# Patient Record
Sex: FEMALE | Race: BLACK OR AFRICAN AMERICAN | Employment: OTHER | URBAN - METROPOLITAN AREA
[De-identification: names, ages, dates, MRNs, and addresses within clinical notes are randomized per-mention and may not be internally consistent; named-entity substitution may affect disease eponyms.]

---

## 2017-01-01 ENCOUNTER — APPOINTMENT (OUTPATIENT)
Dept: CT IMAGING | Age: 72
DRG: 853 | End: 2017-01-01
Attending: EMERGENCY MEDICINE
Payer: MEDICARE

## 2017-01-01 ENCOUNTER — ANESTHESIA EVENT (OUTPATIENT)
Dept: SURGERY | Age: 72
DRG: 853 | End: 2017-01-01
Payer: MEDICARE

## 2017-01-01 ENCOUNTER — HOSPITAL ENCOUNTER (INPATIENT)
Age: 72
LOS: 8 days | DRG: 853 | End: 2017-07-20
Attending: EMERGENCY MEDICINE | Admitting: INTERNAL MEDICINE
Payer: MEDICARE

## 2017-01-01 ENCOUNTER — ANESTHESIA (OUTPATIENT)
Dept: SURGERY | Age: 72
DRG: 853 | End: 2017-01-01
Payer: MEDICARE

## 2017-01-01 ENCOUNTER — HOSPITAL ENCOUNTER (EMERGENCY)
Age: 72
Discharge: HOME | End: 2017-06-23
Attending: EMERGENCY MEDICINE
Payer: MEDICARE

## 2017-01-01 ENCOUNTER — HOSPITAL ENCOUNTER (OUTPATIENT)
Dept: GENERAL RADIOLOGY | Age: 72
Discharge: HOME | End: 2017-03-24
Attending: INTERNAL MEDICINE
Payer: MEDICARE

## 2017-01-01 ENCOUNTER — APPOINTMENT (OUTPATIENT)
Dept: GENERAL RADIOLOGY | Age: 72
DRG: 853 | End: 2017-01-01
Attending: EMERGENCY MEDICINE
Payer: MEDICARE

## 2017-01-01 ENCOUNTER — APPOINTMENT (OUTPATIENT)
Dept: GENERAL RADIOLOGY | Age: 72
DRG: 853 | End: 2017-01-01
Attending: PODIATRIST
Payer: MEDICARE

## 2017-01-01 ENCOUNTER — APPOINTMENT (OUTPATIENT)
Dept: GENERAL RADIOLOGY | Age: 72
DRG: 853 | End: 2017-01-01
Attending: ANESTHESIOLOGY
Payer: MEDICARE

## 2017-01-01 ENCOUNTER — APPOINTMENT (OUTPATIENT)
Dept: INTERVENTIONAL RADIOLOGY/VASCULAR | Age: 72
DRG: 853 | End: 2017-01-01
Attending: FAMILY MEDICINE
Payer: MEDICARE

## 2017-01-01 VITALS
TEMPERATURE: 98.1 F | OXYGEN SATURATION: 100 % | SYSTOLIC BLOOD PRESSURE: 93 MMHG | DIASTOLIC BLOOD PRESSURE: 42 MMHG | BODY MASS INDEX: 22.53 KG/M2 | HEART RATE: 88 BPM | HEIGHT: 66 IN | RESPIRATION RATE: 20 BRPM | WEIGHT: 140.21 LBS

## 2017-01-01 VITALS
BODY MASS INDEX: 19.29 KG/M2 | TEMPERATURE: 98.2 F | SYSTOLIC BLOOD PRESSURE: 113 MMHG | HEIGHT: 66 IN | OXYGEN SATURATION: 90 % | HEART RATE: 52 BPM | RESPIRATION RATE: 15 BRPM | WEIGHT: 120 LBS | DIASTOLIC BLOOD PRESSURE: 82 MMHG

## 2017-01-01 DIAGNOSIS — R41.0 CONFUSION: ICD-10-CM

## 2017-01-01 DIAGNOSIS — R53.1 WEAKNESS: Primary | ICD-10-CM

## 2017-01-01 DIAGNOSIS — L08.9 DIABETIC FOOT INFECTION (HCC): ICD-10-CM

## 2017-01-01 DIAGNOSIS — J43.9 PULMONARY EMPHYSEMA, UNSPECIFIED EMPHYSEMA TYPE (HCC): ICD-10-CM

## 2017-01-01 DIAGNOSIS — R53.81 DEBILITY: ICD-10-CM

## 2017-01-01 DIAGNOSIS — R19.7 DIARRHEA, UNSPECIFIED TYPE: ICD-10-CM

## 2017-01-01 DIAGNOSIS — Z71.89 GOALS OF CARE, COUNSELING/DISCUSSION: ICD-10-CM

## 2017-01-01 DIAGNOSIS — I96 GANGRENE OF FOOT (HCC): ICD-10-CM

## 2017-01-01 DIAGNOSIS — J90 UNSPECIFIED PLEURAL EFFUSION: ICD-10-CM

## 2017-01-01 DIAGNOSIS — R52 PAIN: ICD-10-CM

## 2017-01-01 DIAGNOSIS — N18.6 ESRD (END STAGE RENAL DISEASE) (HCC): ICD-10-CM

## 2017-01-01 DIAGNOSIS — E11.628 DIABETIC FOOT INFECTION (HCC): ICD-10-CM

## 2017-01-01 DIAGNOSIS — R19.7 DIARRHEA OF PRESUMED INFECTIOUS ORIGIN: ICD-10-CM

## 2017-01-01 DIAGNOSIS — A41.9 SEPSIS, DUE TO UNSPECIFIED ORGANISM: Primary | ICD-10-CM

## 2017-01-01 DIAGNOSIS — D72.829 LEUKOCYTOSIS, UNSPECIFIED TYPE: ICD-10-CM

## 2017-01-01 LAB
ABO + RH BLD: NORMAL
ALBUMIN SERPL BCP-MCNC: 1.8 G/DL (ref 3.5–5)
ALBUMIN SERPL BCP-MCNC: 1.9 G/DL (ref 3.5–5)
ALBUMIN SERPL BCP-MCNC: 1.9 G/DL (ref 3.5–5)
ALBUMIN SERPL BCP-MCNC: 2 G/DL (ref 3.5–5)
ALBUMIN SERPL BCP-MCNC: 2.1 G/DL (ref 3.5–5)
ALBUMIN SERPL BCP-MCNC: 2.2 G/DL (ref 3.5–5)
ALBUMIN SERPL BCP-MCNC: 2.2 G/DL (ref 3.5–5)
ALBUMIN SERPL BCP-MCNC: 2.4 G/DL (ref 3.5–5)
ALBUMIN SERPL BCP-MCNC: 3.1 G/DL (ref 3.5–5)
ALBUMIN/GLOB SERPL: 0.4 {RATIO} (ref 1.1–2.2)
ALBUMIN/GLOB SERPL: 0.5 {RATIO} (ref 1.1–2.2)
ALBUMIN/GLOB SERPL: 0.6 {RATIO} (ref 1.1–2.2)
ALP SERPL-CCNC: 116 U/L (ref 45–117)
ALP SERPL-CCNC: 125 U/L (ref 45–117)
ALP SERPL-CCNC: 132 U/L (ref 45–117)
ALP SERPL-CCNC: 135 U/L (ref 45–117)
ALP SERPL-CCNC: 146 U/L (ref 45–117)
ALP SERPL-CCNC: 147 U/L (ref 45–117)
ALP SERPL-CCNC: 164 U/L (ref 45–117)
ALP SERPL-CCNC: 170 U/L (ref 45–117)
ALP SERPL-CCNC: 99 U/L (ref 45–117)
ALT SERPL-CCNC: 11 U/L (ref 12–78)
ALT SERPL-CCNC: 11 U/L (ref 12–78)
ALT SERPL-CCNC: 13 U/L (ref 12–78)
ALT SERPL-CCNC: 16 U/L (ref 12–78)
ALT SERPL-CCNC: 22 U/L (ref 12–78)
ALT SERPL-CCNC: 26 U/L (ref 12–78)
ALT SERPL-CCNC: 35 U/L (ref 12–78)
ALT SERPL-CCNC: 46 U/L (ref 12–78)
ALT SERPL-CCNC: 46 U/L (ref 12–78)
ANION GAP BLD CALC-SCNC: 11 MMOL/L (ref 5–15)
ANION GAP BLD CALC-SCNC: 12 MMOL/L (ref 5–15)
ANION GAP BLD CALC-SCNC: 13 MMOL/L (ref 5–15)
ANION GAP BLD CALC-SCNC: 13 MMOL/L (ref 5–15)
ANION GAP BLD CALC-SCNC: 15 MMOL/L (ref 5–15)
ANION GAP BLD CALC-SCNC: 16 MMOL/L (ref 5–15)
AST SERPL W P-5'-P-CCNC: 125 U/L (ref 15–37)
AST SERPL W P-5'-P-CCNC: 135 U/L (ref 15–37)
AST SERPL W P-5'-P-CCNC: 14 U/L (ref 15–37)
AST SERPL W P-5'-P-CCNC: 147 U/L (ref 15–37)
AST SERPL W P-5'-P-CCNC: 18 U/L (ref 15–37)
AST SERPL W P-5'-P-CCNC: 21 U/L (ref 15–37)
AST SERPL W P-5'-P-CCNC: 24 U/L (ref 15–37)
AST SERPL W P-5'-P-CCNC: 25 U/L (ref 15–37)
AST SERPL W P-5'-P-CCNC: 96 U/L (ref 15–37)
ATRIAL RATE: 61 BPM
BACTERIA SPEC CULT: ABNORMAL
BACTERIA SPEC CULT: NORMAL
BASOPHILS # BLD AUTO: 0 K/UL (ref 0–0.1)
BASOPHILS # BLD AUTO: 0.1 K/UL (ref 0–0.1)
BASOPHILS # BLD: 0 % (ref 0–1)
BASOPHILS # BLD: 1 % (ref 0–1)
BILIRUB SERPL-MCNC: 0.6 MG/DL (ref 0.2–1)
BILIRUB SERPL-MCNC: 0.9 MG/DL (ref 0.2–1)
BILIRUB SERPL-MCNC: 0.9 MG/DL (ref 0.2–1)
BILIRUB SERPL-MCNC: 1 MG/DL (ref 0.2–1)
BILIRUB SERPL-MCNC: 1.1 MG/DL (ref 0.2–1)
BILIRUB SERPL-MCNC: 1.1 MG/DL (ref 0.2–1)
BILIRUB SERPL-MCNC: 1.3 MG/DL (ref 0.2–1)
BILIRUB SERPL-MCNC: 1.6 MG/DL (ref 0.2–1)
BILIRUB SERPL-MCNC: 1.8 MG/DL (ref 0.2–1)
BLASTS NFR BLD: 0 %
BLD PROD TYP BPU: NORMAL
BLOOD GROUP ANTIBODIES SERPL: NORMAL
BPU ID: NORMAL
BUN SERPL-MCNC: 25 MG/DL (ref 6–20)
BUN SERPL-MCNC: 32 MG/DL (ref 6–20)
BUN SERPL-MCNC: 33 MG/DL (ref 6–20)
BUN SERPL-MCNC: 35 MG/DL (ref 6–20)
BUN SERPL-MCNC: 36 MG/DL (ref 6–20)
BUN SERPL-MCNC: 36 MG/DL (ref 6–20)
BUN SERPL-MCNC: 37 MG/DL (ref 6–20)
BUN SERPL-MCNC: 37 MG/DL (ref 6–20)
BUN SERPL-MCNC: 40 MG/DL (ref 6–20)
BUN SERPL-MCNC: 47 MG/DL (ref 6–20)
BUN/CREAT SERPL: 5 (ref 12–20)
BUN/CREAT SERPL: 6 (ref 12–20)
BUN/CREAT SERPL: 7 (ref 12–20)
C DIFF TOX GENS STL QL NAA+PROBE: NEGATIVE
C JEJUNI+C COLI AG STL QL: NEGATIVE
CALCIUM SERPL-MCNC: 8.1 MG/DL (ref 8.5–10.1)
CALCIUM SERPL-MCNC: 8.4 MG/DL (ref 8.5–10.1)
CALCIUM SERPL-MCNC: 8.5 MG/DL (ref 8.5–10.1)
CALCIUM SERPL-MCNC: 8.6 MG/DL (ref 8.5–10.1)
CALCIUM SERPL-MCNC: 8.8 MG/DL (ref 8.5–10.1)
CALCIUM SERPL-MCNC: 8.8 MG/DL (ref 8.5–10.1)
CALCIUM SERPL-MCNC: 9.4 MG/DL (ref 8.5–10.1)
CALCIUM SERPL-MCNC: 9.5 MG/DL (ref 8.5–10.1)
CALCULATED P AXIS, ECG09: 36 DEGREES
CALCULATED R AXIS, ECG10: -33 DEGREES
CALCULATED T AXIS, ECG11: -173 DEGREES
CHLORIDE SERPL-SCNC: 100 MMOL/L (ref 97–108)
CHLORIDE SERPL-SCNC: 100 MMOL/L (ref 97–108)
CHLORIDE SERPL-SCNC: 105 MMOL/L (ref 97–108)
CHLORIDE SERPL-SCNC: 106 MMOL/L (ref 97–108)
CHLORIDE SERPL-SCNC: 107 MMOL/L (ref 97–108)
CHLORIDE SERPL-SCNC: 108 MMOL/L (ref 97–108)
CHLORIDE SERPL-SCNC: 108 MMOL/L (ref 97–108)
CHOLEST SERPL-MCNC: 88 MG/DL
CO2 SERPL-SCNC: 17 MMOL/L (ref 21–32)
CO2 SERPL-SCNC: 18 MMOL/L (ref 21–32)
CO2 SERPL-SCNC: 19 MMOL/L (ref 21–32)
CO2 SERPL-SCNC: 21 MMOL/L (ref 21–32)
CO2 SERPL-SCNC: 23 MMOL/L (ref 21–32)
CO2 SERPL-SCNC: 25 MMOL/L (ref 21–32)
CO2 SERPL-SCNC: 26 MMOL/L (ref 21–32)
CO2 SERPL-SCNC: 29 MMOL/L (ref 21–32)
CREAT SERPL-MCNC: 4.79 MG/DL (ref 0.55–1.02)
CREAT SERPL-MCNC: 5.18 MG/DL (ref 0.55–1.02)
CREAT SERPL-MCNC: 5.33 MG/DL (ref 0.55–1.02)
CREAT SERPL-MCNC: 5.39 MG/DL (ref 0.55–1.02)
CREAT SERPL-MCNC: 5.46 MG/DL (ref 0.55–1.02)
CREAT SERPL-MCNC: 5.5 MG/DL (ref 0.55–1.02)
CREAT SERPL-MCNC: 5.6 MG/DL (ref 0.55–1.02)
CREAT SERPL-MCNC: 5.62 MG/DL (ref 0.55–1.02)
CREAT SERPL-MCNC: 5.78 MG/DL (ref 0.55–1.02)
CREAT SERPL-MCNC: 6.32 MG/DL (ref 0.55–1.02)
CROSSMATCH RESULT,%XM: NORMAL
DIAGNOSIS, 93000: NORMAL
DIFFERENTIAL METHOD BLD: ABNORMAL
E COLI SXT1+2 STL IA: NEGATIVE
EOSINOPHIL # BLD: 0 K/UL (ref 0–0.4)
EOSINOPHIL # BLD: 0.1 K/UL (ref 0–0.4)
EOSINOPHIL # BLD: 0.1 K/UL (ref 0–0.4)
EOSINOPHIL # BLD: 0.3 K/UL (ref 0–0.4)
EOSINOPHIL # BLD: 0.4 K/UL (ref 0–0.4)
EOSINOPHIL NFR BLD: 0 % (ref 0–7)
EOSINOPHIL NFR BLD: 1 % (ref 0–7)
EOSINOPHIL NFR BLD: 1 % (ref 0–7)
EOSINOPHIL NFR BLD: 2 % (ref 0–7)
EOSINOPHIL NFR BLD: 3 % (ref 0–7)
ERYTHROCYTE [DISTWIDTH] IN BLOOD BY AUTOMATED COUNT: 17.7 % (ref 11.5–14.5)
ERYTHROCYTE [DISTWIDTH] IN BLOOD BY AUTOMATED COUNT: 17.7 % (ref 11.5–14.5)
ERYTHROCYTE [DISTWIDTH] IN BLOOD BY AUTOMATED COUNT: 17.9 % (ref 11.5–14.5)
ERYTHROCYTE [DISTWIDTH] IN BLOOD BY AUTOMATED COUNT: 18 % (ref 11.5–14.5)
ERYTHROCYTE [DISTWIDTH] IN BLOOD BY AUTOMATED COUNT: 18.2 % (ref 11.5–14.5)
ERYTHROCYTE [DISTWIDTH] IN BLOOD BY AUTOMATED COUNT: 18.3 % (ref 11.5–14.5)
ERYTHROCYTE [DISTWIDTH] IN BLOOD BY AUTOMATED COUNT: 18.3 % (ref 11.5–14.5)
ERYTHROCYTE [DISTWIDTH] IN BLOOD BY AUTOMATED COUNT: 18.9 % (ref 11.5–14.5)
ERYTHROCYTE [DISTWIDTH] IN BLOOD BY AUTOMATED COUNT: 19.1 % (ref 11.5–14.5)
ERYTHROCYTE [DISTWIDTH] IN BLOOD BY AUTOMATED COUNT: 19.5 % (ref 11.5–14.5)
ERYTHROCYTE [DISTWIDTH] IN BLOOD BY AUTOMATED COUNT: 20.4 % (ref 11.5–14.5)
EST. AVERAGE GLUCOSE BLD GHB EST-MCNC: 117 MG/DL
GLOBULIN SER CALC-MCNC: 4.6 G/DL (ref 2–4)
GLOBULIN SER CALC-MCNC: 4.8 G/DL (ref 2–4)
GLOBULIN SER CALC-MCNC: 4.8 G/DL (ref 2–4)
GLOBULIN SER CALC-MCNC: 4.9 G/DL (ref 2–4)
GLOBULIN SER CALC-MCNC: 5 G/DL (ref 2–4)
GLUCOSE BLD STRIP.AUTO-MCNC: 100 MG/DL (ref 65–100)
GLUCOSE BLD STRIP.AUTO-MCNC: 102 MG/DL (ref 65–100)
GLUCOSE BLD STRIP.AUTO-MCNC: 103 MG/DL (ref 65–100)
GLUCOSE BLD STRIP.AUTO-MCNC: 104 MG/DL (ref 65–100)
GLUCOSE BLD STRIP.AUTO-MCNC: 105 MG/DL (ref 65–100)
GLUCOSE BLD STRIP.AUTO-MCNC: 106 MG/DL (ref 65–100)
GLUCOSE BLD STRIP.AUTO-MCNC: 110 MG/DL (ref 65–100)
GLUCOSE BLD STRIP.AUTO-MCNC: 111 MG/DL (ref 65–100)
GLUCOSE BLD STRIP.AUTO-MCNC: 116 MG/DL (ref 65–100)
GLUCOSE BLD STRIP.AUTO-MCNC: 117 MG/DL (ref 65–100)
GLUCOSE BLD STRIP.AUTO-MCNC: 119 MG/DL (ref 65–100)
GLUCOSE BLD STRIP.AUTO-MCNC: 119 MG/DL (ref 65–100)
GLUCOSE BLD STRIP.AUTO-MCNC: 127 MG/DL (ref 65–100)
GLUCOSE BLD STRIP.AUTO-MCNC: 127 MG/DL (ref 65–100)
GLUCOSE BLD STRIP.AUTO-MCNC: 128 MG/DL (ref 65–100)
GLUCOSE BLD STRIP.AUTO-MCNC: 129 MG/DL (ref 65–100)
GLUCOSE BLD STRIP.AUTO-MCNC: 131 MG/DL (ref 65–100)
GLUCOSE BLD STRIP.AUTO-MCNC: 139 MG/DL (ref 65–100)
GLUCOSE BLD STRIP.AUTO-MCNC: 143 MG/DL (ref 65–100)
GLUCOSE BLD STRIP.AUTO-MCNC: 150 MG/DL (ref 65–100)
GLUCOSE BLD STRIP.AUTO-MCNC: 158 MG/DL (ref 65–100)
GLUCOSE BLD STRIP.AUTO-MCNC: 161 MG/DL (ref 65–100)
GLUCOSE BLD STRIP.AUTO-MCNC: 169 MG/DL (ref 65–100)
GLUCOSE BLD STRIP.AUTO-MCNC: 183 MG/DL (ref 65–100)
GLUCOSE BLD STRIP.AUTO-MCNC: 54 MG/DL (ref 65–100)
GLUCOSE BLD STRIP.AUTO-MCNC: 59 MG/DL (ref 65–100)
GLUCOSE BLD STRIP.AUTO-MCNC: 70 MG/DL (ref 65–100)
GLUCOSE BLD STRIP.AUTO-MCNC: 71 MG/DL (ref 65–100)
GLUCOSE BLD STRIP.AUTO-MCNC: 77 MG/DL (ref 65–100)
GLUCOSE BLD STRIP.AUTO-MCNC: 78 MG/DL (ref 65–100)
GLUCOSE BLD STRIP.AUTO-MCNC: 80 MG/DL (ref 65–100)
GLUCOSE BLD STRIP.AUTO-MCNC: 83 MG/DL (ref 65–100)
GLUCOSE BLD STRIP.AUTO-MCNC: 87 MG/DL (ref 65–100)
GLUCOSE BLD STRIP.AUTO-MCNC: 88 MG/DL (ref 65–100)
GLUCOSE BLD STRIP.AUTO-MCNC: 91 MG/DL (ref 65–100)
GLUCOSE BLD STRIP.AUTO-MCNC: 94 MG/DL (ref 65–100)
GLUCOSE BLD STRIP.AUTO-MCNC: 95 MG/DL (ref 65–100)
GLUCOSE BLD STRIP.AUTO-MCNC: 99 MG/DL (ref 65–100)
GLUCOSE SERPL-MCNC: 101 MG/DL (ref 65–100)
GLUCOSE SERPL-MCNC: 102 MG/DL (ref 65–100)
GLUCOSE SERPL-MCNC: 103 MG/DL (ref 65–100)
GLUCOSE SERPL-MCNC: 115 MG/DL (ref 65–100)
GLUCOSE SERPL-MCNC: 115 MG/DL (ref 65–100)
GLUCOSE SERPL-MCNC: 119 MG/DL (ref 65–100)
GLUCOSE SERPL-MCNC: 127 MG/DL (ref 65–100)
GLUCOSE SERPL-MCNC: 148 MG/DL (ref 65–100)
GLUCOSE SERPL-MCNC: 154 MG/DL (ref 65–100)
GLUCOSE SERPL-MCNC: 87 MG/DL (ref 65–100)
GRAM STN SPEC: ABNORMAL
HBA1C MFR BLD: 5.7 % (ref 4.2–6.3)
HBV SURFACE AG SER QL: <0.1 INDEX
HBV SURFACE AG SER QL: NEGATIVE
HCT VFR BLD AUTO: 26.4 % (ref 35–47)
HCT VFR BLD AUTO: 27.3 % (ref 35–47)
HCT VFR BLD AUTO: 27.9 % (ref 35–47)
HCT VFR BLD AUTO: 28.1 % (ref 35–47)
HCT VFR BLD AUTO: 28.8 % (ref 35–47)
HCT VFR BLD AUTO: 29 % (ref 35–47)
HCT VFR BLD AUTO: 29.7 % (ref 35–47)
HCT VFR BLD AUTO: 29.9 % (ref 35–47)
HCT VFR BLD AUTO: 30.9 % (ref 35–47)
HCT VFR BLD AUTO: 31.3 % (ref 35–47)
HCT VFR BLD AUTO: 32.6 % (ref 35–47)
HCT VFR BLD AUTO: 33.6 % (ref 35–47)
HCT VFR BLD AUTO: 35.6 % (ref 35–47)
HDLC SERPL-MCNC: 60 MG/DL
HDLC SERPL: 1.5 {RATIO} (ref 0–5)
HEMOCCULT STL QL: NEGATIVE
HGB BLD-MCNC: 10 G/DL (ref 11.5–16)
HGB BLD-MCNC: 10 G/DL (ref 11.5–16)
HGB BLD-MCNC: 10.3 G/DL (ref 11.5–16)
HGB BLD-MCNC: 10.9 G/DL (ref 11.5–16)
HGB BLD-MCNC: 11.3 G/DL (ref 11.5–16)
HGB BLD-MCNC: 8.6 G/DL (ref 11.5–16)
HGB BLD-MCNC: 8.8 G/DL (ref 11.5–16)
HGB BLD-MCNC: 9.3 G/DL (ref 11.5–16)
HGB BLD-MCNC: 9.4 G/DL (ref 11.5–16)
HGB BLD-MCNC: 9.5 G/DL (ref 11.5–16)
HGB BLD-MCNC: 9.6 G/DL (ref 11.5–16)
HGB BLD-MCNC: 9.7 G/DL (ref 11.5–16)
HGB BLD-MCNC: 9.8 G/DL (ref 11.5–16)
INR PPP: 1.4 (ref 0.9–1.1)
LACTATE SERPL-SCNC: 1.7 MMOL/L (ref 0.4–2)
LACTATE SERPL-SCNC: 1.8 MMOL/L (ref 0.4–2)
LACTATE SERPL-SCNC: 4.2 MMOL/L (ref 0.4–2)
LDLC SERPL CALC-MCNC: 14 MG/DL (ref 0–100)
LIPASE SERPL-CCNC: 33 U/L (ref 73–393)
LIPID PROFILE,FLP: NORMAL
LYMPHOCYTES # BLD AUTO: 11 % (ref 12–49)
LYMPHOCYTES # BLD AUTO: 2 % (ref 12–49)
LYMPHOCYTES # BLD AUTO: 3 % (ref 12–49)
LYMPHOCYTES # BLD AUTO: 4 % (ref 12–49)
LYMPHOCYTES # BLD AUTO: 6 % (ref 12–49)
LYMPHOCYTES # BLD AUTO: 6 % (ref 12–49)
LYMPHOCYTES # BLD: 0.4 K/UL (ref 0.8–3.5)
LYMPHOCYTES # BLD: 0.5 K/UL (ref 0.8–3.5)
LYMPHOCYTES # BLD: 0.5 K/UL (ref 0.8–3.5)
LYMPHOCYTES # BLD: 0.6 K/UL (ref 0.8–3.5)
LYMPHOCYTES # BLD: 0.6 K/UL (ref 0.8–3.5)
LYMPHOCYTES # BLD: 0.7 K/UL (ref 0.8–3.5)
LYMPHOCYTES # BLD: 0.8 K/UL (ref 0.8–3.5)
LYMPHOCYTES # BLD: 0.9 K/UL (ref 0.8–3.5)
LYMPHOCYTES # BLD: 1 K/UL (ref 0.8–3.5)
MAGNESIUM SERPL-MCNC: 1.7 MG/DL (ref 1.6–2.4)
MAGNESIUM SERPL-MCNC: 1.7 MG/DL (ref 1.6–2.4)
MAGNESIUM SERPL-MCNC: 1.8 MG/DL (ref 1.6–2.4)
MAGNESIUM SERPL-MCNC: 1.9 MG/DL (ref 1.6–2.4)
MAGNESIUM SERPL-MCNC: 1.9 MG/DL (ref 1.6–2.4)
MAGNESIUM SERPL-MCNC: 2 MG/DL (ref 1.6–2.4)
MAGNESIUM SERPL-MCNC: 2.1 MG/DL (ref 1.6–2.4)
MANUAL DIFFERENTIAL PERFORMED BLD QL: ABNORMAL
MCH RBC QN AUTO: 29.4 PG (ref 26–34)
MCH RBC QN AUTO: 29.4 PG (ref 26–34)
MCH RBC QN AUTO: 29.5 PG (ref 26–34)
MCH RBC QN AUTO: 29.5 PG (ref 26–34)
MCH RBC QN AUTO: 29.6 PG (ref 26–34)
MCH RBC QN AUTO: 29.6 PG (ref 26–34)
MCH RBC QN AUTO: 29.9 PG (ref 26–34)
MCH RBC QN AUTO: 30.1 PG (ref 26–34)
MCH RBC QN AUTO: 30.1 PG (ref 26–34)
MCHC RBC AUTO-ENTMCNC: 31.6 G/DL (ref 30–36.5)
MCHC RBC AUTO-ENTMCNC: 31.7 G/DL (ref 30–36.5)
MCHC RBC AUTO-ENTMCNC: 31.9 G/DL (ref 30–36.5)
MCHC RBC AUTO-ENTMCNC: 32.2 G/DL (ref 30–36.5)
MCHC RBC AUTO-ENTMCNC: 32.4 G/DL (ref 30–36.5)
MCHC RBC AUTO-ENTMCNC: 32.6 G/DL (ref 30–36.5)
MCHC RBC AUTO-ENTMCNC: 32.6 G/DL (ref 30–36.5)
MCHC RBC AUTO-ENTMCNC: 33 G/DL (ref 30–36.5)
MCHC RBC AUTO-ENTMCNC: 33.1 G/DL (ref 30–36.5)
MCHC RBC AUTO-ENTMCNC: 33.3 G/DL (ref 30–36.5)
MCHC RBC AUTO-ENTMCNC: 33.8 G/DL (ref 30–36.5)
MCV RBC AUTO: 88.3 FL (ref 80–99)
MCV RBC AUTO: 88.4 FL (ref 80–99)
MCV RBC AUTO: 89.2 FL (ref 80–99)
MCV RBC AUTO: 89.5 FL (ref 80–99)
MCV RBC AUTO: 90.4 FL (ref 80–99)
MCV RBC AUTO: 91.2 FL (ref 80–99)
MCV RBC AUTO: 91.7 FL (ref 80–99)
MCV RBC AUTO: 93.2 FL (ref 80–99)
MCV RBC AUTO: 93.5 FL (ref 80–99)
MCV RBC AUTO: 94.3 FL (ref 80–99)
MCV RBC AUTO: 94.5 FL (ref 80–99)
METAMYELOCYTES NFR BLD MANUAL: 0 %
METAMYELOCYTES NFR BLD MANUAL: 1 %
MONOCYTES # BLD: 0 K/UL (ref 0–1)
MONOCYTES # BLD: 0.5 K/UL (ref 0–1)
MONOCYTES # BLD: 0.7 K/UL (ref 0–1)
MONOCYTES # BLD: 0.9 K/UL (ref 0–1)
MONOCYTES # BLD: 1 K/UL (ref 0–1)
MONOCYTES # BLD: 1 K/UL (ref 0–1)
MONOCYTES # BLD: 1.1 K/UL (ref 0–1)
MONOCYTES # BLD: 1.2 K/UL (ref 0–1)
MONOCYTES # BLD: 1.2 K/UL (ref 0–1)
MONOCYTES NFR BLD AUTO: 0 % (ref 5–13)
MONOCYTES NFR BLD AUTO: 4 % (ref 5–13)
MONOCYTES NFR BLD AUTO: 4 % (ref 5–13)
MONOCYTES NFR BLD AUTO: 6 % (ref 5–13)
MONOCYTES NFR BLD AUTO: 6 % (ref 5–13)
MONOCYTES NFR BLD AUTO: 7 % (ref 5–13)
MONOCYTES NFR BLD AUTO: 9 % (ref 5–13)
MYELOCYTES NFR BLD MANUAL: 0 %
MYELOCYTES NFR BLD MANUAL: 1 %
NEUTS BAND NFR BLD MANUAL: 0 % (ref 0–6)
NEUTS BAND NFR BLD MANUAL: 1 % (ref 0–6)
NEUTS SEG # BLD: 11.3 K/UL (ref 1.8–8)
NEUTS SEG # BLD: 12.6 K/UL (ref 1.8–8)
NEUTS SEG # BLD: 14.8 K/UL (ref 1.8–8)
NEUTS SEG # BLD: 15.6 K/UL (ref 1.8–8)
NEUTS SEG # BLD: 15.9 K/UL (ref 1.8–8)
NEUTS SEG # BLD: 18.4 K/UL (ref 1.8–8)
NEUTS SEG # BLD: 26.5 K/UL (ref 1.8–8)
NEUTS SEG # BLD: 4.6 K/UL (ref 1.8–8)
NEUTS SEG # BLD: 8.1 K/UL (ref 1.8–8)
NEUTS SEG NFR BLD AUTO: 79 % (ref 32–75)
NEUTS SEG NFR BLD AUTO: 84 % (ref 32–75)
NEUTS SEG NFR BLD AUTO: 84 % (ref 32–75)
NEUTS SEG NFR BLD AUTO: 87 % (ref 32–75)
NEUTS SEG NFR BLD AUTO: 87 % (ref 32–75)
NEUTS SEG NFR BLD AUTO: 89 % (ref 32–75)
NEUTS SEG NFR BLD AUTO: 90 % (ref 32–75)
NEUTS SEG NFR BLD AUTO: 94 % (ref 32–75)
NEUTS SEG NFR BLD AUTO: 95 % (ref 32–75)
NRBC # BLD: 0.16 K/UL (ref 0–0.01)
NRBC # BLD: 0.17 K/UL (ref 0–0.01)
NRBC # BLD: 0.18 K/UL (ref 0–0.01)
NRBC # BLD: 0.22 K/UL (ref 0–0.01)
NRBC # BLD: 0.31 K/UL (ref 0–0.01)
NRBC # BLD: 0.32 K/UL (ref 0–0.01)
NRBC # BLD: 0.37 K/UL (ref 0–0.01)
NRBC # BLD: 0.47 K/UL (ref 0–0.01)
NRBC BLD-RTO: 0.6 PER 100 WBC
NRBC BLD-RTO: 1 PER 100 WBC
NRBC BLD-RTO: 1.1 PER 100 WBC
NRBC BLD-RTO: 1.1 PER 100 WBC
NRBC BLD-RTO: 1.8 PER 100 WBC
NRBC BLD-RTO: 2 PER 100 WBC
NRBC BLD-RTO: 2.2 PER 100 WBC
NRBC BLD-RTO: 2.4 PER 100 WBC
NRBC BLD-RTO: 3 PER 100 WBC
NRBC BLD-RTO: 4.9 PER 100 WBC
P-R INTERVAL, ECG05: 152 MS
PHOSPHATE SERPL-MCNC: 4.4 MG/DL (ref 2.6–4.7)
PLATELET # BLD AUTO: 152 K/UL (ref 150–400)
PLATELET # BLD AUTO: 156 K/UL (ref 150–400)
PLATELET # BLD AUTO: 167 K/UL (ref 150–400)
PLATELET # BLD AUTO: 174 K/UL (ref 150–400)
PLATELET # BLD AUTO: 185 K/UL (ref 150–400)
PLATELET # BLD AUTO: 187 K/UL (ref 150–400)
PLATELET # BLD AUTO: 194 K/UL (ref 150–400)
PLATELET # BLD AUTO: 197 K/UL (ref 150–400)
PLATELET # BLD AUTO: 202 K/UL (ref 150–400)
PLATELET # BLD AUTO: 203 K/UL (ref 150–400)
PLATELET # BLD AUTO: 217 K/UL (ref 150–400)
POTASSIUM SERPL-SCNC: 3.7 MMOL/L (ref 3.5–5.1)
POTASSIUM SERPL-SCNC: 3.7 MMOL/L (ref 3.5–5.1)
POTASSIUM SERPL-SCNC: 3.8 MMOL/L (ref 3.5–5.1)
POTASSIUM SERPL-SCNC: 4 MMOL/L (ref 3.5–5.1)
POTASSIUM SERPL-SCNC: 4 MMOL/L (ref 3.5–5.1)
POTASSIUM SERPL-SCNC: 4.1 MMOL/L (ref 3.5–5.1)
POTASSIUM SERPL-SCNC: 4.1 MMOL/L (ref 3.5–5.1)
POTASSIUM SERPL-SCNC: 4.5 MMOL/L (ref 3.5–5.1)
POTASSIUM SERPL-SCNC: 4.6 MMOL/L (ref 3.5–5.1)
POTASSIUM SERPL-SCNC: 5 MMOL/L (ref 3.5–5.1)
PROMYELOCYTES NFR BLD MANUAL: 0 %
PROT SERPL-MCNC: 6.4 G/DL (ref 6.4–8.2)
PROT SERPL-MCNC: 6.7 G/DL (ref 6.4–8.2)
PROT SERPL-MCNC: 6.8 G/DL (ref 6.4–8.2)
PROT SERPL-MCNC: 6.9 G/DL (ref 6.4–8.2)
PROT SERPL-MCNC: 7 G/DL (ref 6.4–8.2)
PROT SERPL-MCNC: 7.1 G/DL (ref 6.4–8.2)
PROT SERPL-MCNC: 7.2 G/DL (ref 6.4–8.2)
PROT SERPL-MCNC: 7.4 G/DL (ref 6.4–8.2)
PROT SERPL-MCNC: 8 G/DL (ref 6.4–8.2)
PROTHROMBIN TIME: 14.5 SEC (ref 9–11.1)
Q-T INTERVAL, ECG07: 488 MS
QRS DURATION, ECG06: 98 MS
QTC CALCULATION (BEZET), ECG08: 491 MS
RBC # BLD AUTO: 2.92 M/UL (ref 3.8–5.2)
RBC # BLD AUTO: 2.92 M/UL (ref 3.8–5.2)
RBC # BLD AUTO: 3.14 M/UL (ref 3.8–5.2)
RBC # BLD AUTO: 3.16 M/UL (ref 3.8–5.2)
RBC # BLD AUTO: 3.18 M/UL (ref 3.8–5.2)
RBC # BLD AUTO: 3.24 M/UL (ref 3.8–5.2)
RBC # BLD AUTO: 3.32 M/UL (ref 3.8–5.2)
RBC # BLD AUTO: 3.33 M/UL (ref 3.8–5.2)
RBC # BLD AUTO: 3.39 M/UL (ref 3.8–5.2)
RBC # BLD AUTO: 3.45 M/UL (ref 3.8–5.2)
RBC # BLD AUTO: 3.82 M/UL (ref 3.8–5.2)
RBC MORPH BLD: ABNORMAL
SERVICE CMNT-IMP: ABNORMAL
SERVICE CMNT-IMP: NORMAL
SODIUM SERPL-SCNC: 135 MMOL/L (ref 136–145)
SODIUM SERPL-SCNC: 137 MMOL/L (ref 136–145)
SODIUM SERPL-SCNC: 138 MMOL/L (ref 136–145)
SODIUM SERPL-SCNC: 139 MMOL/L (ref 136–145)
SODIUM SERPL-SCNC: 139 MMOL/L (ref 136–145)
SODIUM SERPL-SCNC: 140 MMOL/L (ref 136–145)
SODIUM SERPL-SCNC: 141 MMOL/L (ref 136–145)
SODIUM SERPL-SCNC: 142 MMOL/L (ref 136–145)
SPECIMEN EXP DATE BLD: NORMAL
STATUS OF UNIT,%ST: NORMAL
TRIGL SERPL-MCNC: 70 MG/DL (ref ?–150)
TROPONIN I SERPL-MCNC: <0.04 NG/ML
TSH SERPL DL<=0.05 MIU/L-ACNC: 5.51 UIU/ML (ref 0.36–3.74)
UNIT DIVISION, %UDIV: 0
VANCOMYCIN SERPL-MCNC: 16.5 UG/ML
VENTRICULAR RATE, ECG03: 61 BPM
VLDLC SERPL CALC-MCNC: 14 MG/DL
WBC # BLD AUTO: 13.4 K/UL (ref 3.6–11)
WBC # BLD AUTO: 13.6 K/UL (ref 3.6–11)
WBC # BLD AUTO: 14.5 K/UL (ref 3.6–11)
WBC # BLD AUTO: 16.6 K/UL (ref 3.6–11)
WBC # BLD AUTO: 17 K/UL (ref 3.6–11)
WBC # BLD AUTO: 17.3 K/UL (ref 3.6–11)
WBC # BLD AUTO: 20.4 K/UL (ref 3.6–11)
WBC # BLD AUTO: 25.8 K/UL (ref 3.6–11)
WBC # BLD AUTO: 28.2 K/UL (ref 3.6–11)
WBC # BLD AUTO: 5.8 K/UL (ref 3.6–11)
WBC # BLD AUTO: 9.6 K/UL (ref 3.6–11)
WBC #/AREA STL HPF: NORMAL /HPF (ref 0–4)
WBC NRBC COR # BLD: ABNORMAL 10*3/UL
WBC OTHER # BLD MANUAL: 0 10*3/UL

## 2017-01-01 PROCEDURE — 74011250636 HC RX REV CODE- 250/636: Performed by: EMERGENCY MEDICINE

## 2017-01-01 PROCEDURE — 74011000250 HC RX REV CODE- 250: Performed by: PODIATRIST

## 2017-01-01 PROCEDURE — 77030011841 HC SUT PLN COVD -A: Performed by: PODIATRIST

## 2017-01-01 PROCEDURE — 90935 HEMODIALYSIS ONE EVALUATION: CPT

## 2017-01-01 PROCEDURE — 87493 C DIFF AMPLIFIED PROBE: CPT | Performed by: INTERNAL MEDICINE

## 2017-01-01 PROCEDURE — 74011250637 HC RX REV CODE- 250/637: Performed by: INTERNAL MEDICINE

## 2017-01-01 PROCEDURE — 73630 X-RAY EXAM OF FOOT: CPT

## 2017-01-01 PROCEDURE — 87205 SMEAR GRAM STAIN: CPT | Performed by: FAMILY MEDICINE

## 2017-01-01 PROCEDURE — 80048 BASIC METABOLIC PNL TOTAL CA: CPT | Performed by: SURGERY

## 2017-01-01 PROCEDURE — 74011250636 HC RX REV CODE- 250/636

## 2017-01-01 PROCEDURE — B4101ZZ FLUOROSCOPY OF ABDOMINAL AORTA USING LOW OSMOLAR CONTRAST: ICD-10-PCS

## 2017-01-01 PROCEDURE — 80053 COMPREHEN METABOLIC PANEL: CPT | Performed by: INTERNAL MEDICINE

## 2017-01-01 PROCEDURE — 77010033678 HC OXYGEN DAILY

## 2017-01-01 PROCEDURE — 36415 COLL VENOUS BLD VENIPUNCTURE: CPT | Performed by: INTERNAL MEDICINE

## 2017-01-01 PROCEDURE — 85025 COMPLETE CBC W/AUTO DIFF WBC: CPT | Performed by: FAMILY MEDICINE

## 2017-01-01 PROCEDURE — 83036 HEMOGLOBIN GLYCOSYLATED A1C: CPT | Performed by: INTERNAL MEDICINE

## 2017-01-01 PROCEDURE — 80053 COMPREHEN METABOLIC PANEL: CPT | Performed by: EMERGENCY MEDICINE

## 2017-01-01 PROCEDURE — 87147 CULTURE TYPE IMMUNOLOGIC: CPT | Performed by: FAMILY MEDICINE

## 2017-01-01 PROCEDURE — 36415 COLL VENOUS BLD VENIPUNCTURE: CPT | Performed by: SURGERY

## 2017-01-01 PROCEDURE — 74011000250 HC RX REV CODE- 250: Performed by: INTERNAL MEDICINE

## 2017-01-01 PROCEDURE — 74011250636 HC RX REV CODE- 250/636: Performed by: INTERNAL MEDICINE

## 2017-01-01 PROCEDURE — 87205 SMEAR GRAM STAIN: CPT | Performed by: PODIATRIST

## 2017-01-01 PROCEDURE — 74011000258 HC RX REV CODE- 258: Performed by: FAMILY MEDICINE

## 2017-01-01 PROCEDURE — 99284 EMERGENCY DEPT VISIT MOD MDM: CPT

## 2017-01-01 PROCEDURE — 96374 THER/PROPH/DIAG INJ IV PUSH: CPT

## 2017-01-01 PROCEDURE — 74011250637 HC RX REV CODE- 250/637: Performed by: FAMILY MEDICINE

## 2017-01-01 PROCEDURE — 5A1D60Z PERFORMANCE OF URINARY FILTRATION, MULTIPLE: ICD-10-PCS | Performed by: INTERNAL MEDICINE

## 2017-01-01 PROCEDURE — 74011000258 HC RX REV CODE- 258: Performed by: INTERNAL MEDICINE

## 2017-01-01 PROCEDURE — 36592 COLLECT BLOOD FROM PICC: CPT

## 2017-01-01 PROCEDURE — 85025 COMPLETE CBC W/AUTO DIFF WBC: CPT | Performed by: EMERGENCY MEDICINE

## 2017-01-01 PROCEDURE — 76450000000

## 2017-01-01 PROCEDURE — 04CH0ZZ EXTIRPATION OF MATTER FROM RIGHT EXTERNAL ILIAC ARTERY, OPEN APPROACH: ICD-10-PCS | Performed by: SURGERY

## 2017-01-01 PROCEDURE — 80053 COMPREHEN METABOLIC PANEL: CPT | Performed by: FAMILY MEDICINE

## 2017-01-01 PROCEDURE — 80202 ASSAY OF VANCOMYCIN: CPT

## 2017-01-01 PROCEDURE — 77030000032 HC CUF TRNQT ZIMM -B: Performed by: PODIATRIST

## 2017-01-01 PROCEDURE — 0Y6N0Z9 DETACHMENT AT LEFT FOOT, PARTIAL 1ST RAY, OPEN APPROACH: ICD-10-PCS | Performed by: PODIATRIST

## 2017-01-01 PROCEDURE — B41G1ZZ FLUOROSCOPY OF LEFT LOWER EXTREMITY ARTERIES USING LOW OSMOLAR CONTRAST: ICD-10-PCS

## 2017-01-01 PROCEDURE — 74011250636 HC RX REV CODE- 250/636: Performed by: ANESTHESIOLOGY

## 2017-01-01 PROCEDURE — 82962 GLUCOSE BLOOD TEST: CPT

## 2017-01-01 PROCEDURE — C1887 CATHETER, GUIDING: HCPCS

## 2017-01-01 PROCEDURE — 88304 TISSUE EXAM BY PATHOLOGIST: CPT | Performed by: INTERNAL MEDICINE

## 2017-01-01 PROCEDURE — 87045 FECES CULTURE AEROBIC BACT: CPT | Performed by: EMERGENCY MEDICINE

## 2017-01-01 PROCEDURE — 77030029065 HC DRSG HEMO QCLOT ZMED -B

## 2017-01-01 PROCEDURE — 74011000250 HC RX REV CODE- 250: Performed by: FAMILY MEDICINE

## 2017-01-01 PROCEDURE — 77030032490 HC SLV COMPR SCD KNE COVD -B

## 2017-01-01 PROCEDURE — P9047 ALBUMIN (HUMAN), 25%, 50ML: HCPCS | Performed by: INTERNAL MEDICINE

## 2017-01-01 PROCEDURE — 02HV33Z INSERTION OF INFUSION DEVICE INTO SUPERIOR VENA CAVA, PERCUTANEOUS APPROACH: ICD-10-PCS | Performed by: FAMILY MEDICINE

## 2017-01-01 PROCEDURE — 83605 ASSAY OF LACTIC ACID: CPT | Performed by: EMERGENCY MEDICINE

## 2017-01-01 PROCEDURE — 77030011640 HC PAD GRND REM COVD -A: Performed by: SURGERY

## 2017-01-01 PROCEDURE — 77030010852 HC CATH NB ADVNTG COOK -B

## 2017-01-01 PROCEDURE — 87340 HEPATITIS B SURFACE AG IA: CPT | Performed by: INTERNAL MEDICINE

## 2017-01-01 PROCEDURE — 88311 DECALCIFY TISSUE: CPT | Performed by: PODIATRIST

## 2017-01-01 PROCEDURE — 77030004530 HC CATH ANGI DX IMGR BSC -A

## 2017-01-01 PROCEDURE — 36415 COLL VENOUS BLD VENIPUNCTURE: CPT | Performed by: FAMILY MEDICINE

## 2017-01-01 PROCEDURE — 77030020782 HC GWN BAIR PAWS FLX 3M -B

## 2017-01-01 PROCEDURE — 83735 ASSAY OF MAGNESIUM: CPT | Performed by: FAMILY MEDICINE

## 2017-01-01 PROCEDURE — 74011636637 HC RX REV CODE- 636/637: Performed by: INTERNAL MEDICINE

## 2017-01-01 PROCEDURE — 82272 OCCULT BLD FECES 1-3 TESTS: CPT | Performed by: EMERGENCY MEDICINE

## 2017-01-01 PROCEDURE — 84443 ASSAY THYROID STIM HORMONE: CPT | Performed by: INTERNAL MEDICINE

## 2017-01-01 PROCEDURE — C1894 INTRO/SHEATH, NON-LASER: HCPCS

## 2017-01-01 PROCEDURE — 93306 TTE W/DOPPLER COMPLETE: CPT

## 2017-01-01 PROCEDURE — 0DJ08ZZ INSPECTION OF UPPER INTESTINAL TRACT, VIA NATURAL OR ARTIFICIAL OPENING ENDOSCOPIC: ICD-10-PCS | Performed by: INTERNAL MEDICINE

## 2017-01-01 PROCEDURE — 77030013060 HC DEV INFL PRSS MRTM -B: Performed by: SURGERY

## 2017-01-01 PROCEDURE — C1757 CATH, THROMBECTOMY/EMBOLECT: HCPCS | Performed by: SURGERY

## 2017-01-01 PROCEDURE — 85027 COMPLETE CBC AUTOMATED: CPT | Performed by: INTERNAL MEDICINE

## 2017-01-01 PROCEDURE — 77030014008 HC SPNG HEMSTAT J&J -C: Performed by: SURGERY

## 2017-01-01 PROCEDURE — 87077 CULTURE AEROBIC IDENTIFY: CPT | Performed by: PODIATRIST

## 2017-01-01 PROCEDURE — 76210000016 HC OR PH I REC 1 TO 1.5 HR: Performed by: PODIATRIST

## 2017-01-01 PROCEDURE — 93005 ELECTROCARDIOGRAM TRACING: CPT

## 2017-01-01 PROCEDURE — 65610000006 HC RM INTENSIVE CARE

## 2017-01-01 PROCEDURE — 76210000016 HC OR PH I REC 1 TO 1.5 HR: Performed by: SURGERY

## 2017-01-01 PROCEDURE — 77030029131 HC ADMN ST IV BLD N DEHP ICUM -B

## 2017-01-01 PROCEDURE — 80053 COMPREHEN METABOLIC PANEL: CPT

## 2017-01-01 PROCEDURE — 87186 SC STD MICRODIL/AGAR DIL: CPT | Performed by: PODIATRIST

## 2017-01-01 PROCEDURE — 0JBR0ZZ EXCISION OF LEFT FOOT SUBCUTANEOUS TISSUE AND FASCIA, OPEN APPROACH: ICD-10-PCS | Performed by: PODIATRIST

## 2017-01-01 PROCEDURE — 87186 SC STD MICRODIL/AGAR DIL: CPT | Performed by: FAMILY MEDICINE

## 2017-01-01 PROCEDURE — 76060000032 HC ANESTHESIA 0.5 TO 1 HR: Performed by: PODIATRIST

## 2017-01-01 PROCEDURE — 36415 COLL VENOUS BLD VENIPUNCTURE: CPT

## 2017-01-01 PROCEDURE — 71020 XR CHEST PA LAT: CPT

## 2017-01-01 PROCEDURE — 77030018836 HC SOL IRR NACL ICUM -A: Performed by: PODIATRIST

## 2017-01-01 PROCEDURE — 74011636320 HC RX REV CODE- 636/320

## 2017-01-01 PROCEDURE — P9016 RBC LEUKOCYTES REDUCED: HCPCS | Performed by: INTERNAL MEDICINE

## 2017-01-01 PROCEDURE — 77030002986 HC SUT PROL J&J -A: Performed by: PODIATRIST

## 2017-01-01 PROCEDURE — 99285 EMERGENCY DEPT VISIT HI MDM: CPT

## 2017-01-01 PROCEDURE — 77030034850: Performed by: SURGERY

## 2017-01-01 PROCEDURE — 77030011640 HC PAD GRND REM COVD -A: Performed by: PODIATRIST

## 2017-01-01 PROCEDURE — 85025 COMPLETE CBC W/AUTO DIFF WBC: CPT

## 2017-01-01 PROCEDURE — 96361 HYDRATE IV INFUSION ADD-ON: CPT

## 2017-01-01 PROCEDURE — 36415 COLL VENOUS BLD VENIPUNCTURE: CPT | Performed by: EMERGENCY MEDICINE

## 2017-01-01 PROCEDURE — 88311 DECALCIFY TISSUE: CPT | Performed by: INTERNAL MEDICINE

## 2017-01-01 PROCEDURE — 86900 BLOOD TYPING SEROLOGIC ABO: CPT | Performed by: INTERNAL MEDICINE

## 2017-01-01 PROCEDURE — 047D3ZZ DILATION OF LEFT COMMON ILIAC ARTERY, PERCUTANEOUS APPROACH: ICD-10-PCS

## 2017-01-01 PROCEDURE — 75625 CONTRAST EXAM ABDOMINL AORTA: CPT

## 2017-01-01 PROCEDURE — 89055 LEUKOCYTE ASSESSMENT FECAL: CPT | Performed by: EMERGENCY MEDICINE

## 2017-01-01 PROCEDURE — 83735 ASSAY OF MAGNESIUM: CPT | Performed by: SURGERY

## 2017-01-01 PROCEDURE — C1751 CATH, INF, PER/CENT/MIDLINE: HCPCS

## 2017-01-01 PROCEDURE — 74011250636 HC RX REV CODE- 250/636: Performed by: SURGERY

## 2017-01-01 PROCEDURE — 77030006773 HC BLD SAW OSC BRSM -A: Performed by: PODIATRIST

## 2017-01-01 PROCEDURE — 0S9N0ZZ DRAINAGE OF LEFT METATARSAL-PHALANGEAL JOINT, OPEN APPROACH: ICD-10-PCS | Performed by: PODIATRIST

## 2017-01-01 PROCEDURE — 74011250637 HC RX REV CODE- 250/637: Performed by: SURGERY

## 2017-01-01 PROCEDURE — 87075 CULTR BACTERIA EXCEPT BLOOD: CPT | Performed by: PODIATRIST

## 2017-01-01 PROCEDURE — C1768 GRAFT, VASCULAR: HCPCS | Performed by: SURGERY

## 2017-01-01 PROCEDURE — 77030031139 HC SUT VCRL2 J&J -A: Performed by: PODIATRIST

## 2017-01-01 PROCEDURE — 76040000019: Performed by: INTERNAL MEDICINE

## 2017-01-01 PROCEDURE — 77030020256 HC SOL INJ NACL 0.9%  500ML: Performed by: SURGERY

## 2017-01-01 PROCEDURE — 74011000250 HC RX REV CODE- 250

## 2017-01-01 PROCEDURE — 94762 N-INVAS EAR/PLS OXIMTRY CONT: CPT

## 2017-01-01 PROCEDURE — 77030011256 HC DRSG MEPILEX <16IN NO BORD MOLN -A

## 2017-01-01 PROCEDURE — 74011000250 HC RX REV CODE- 250: Performed by: SURGERY

## 2017-01-01 PROCEDURE — 77030002987 HC SUT PROL J&J -B: Performed by: SURGERY

## 2017-01-01 PROCEDURE — 83690 ASSAY OF LIPASE: CPT | Performed by: EMERGENCY MEDICINE

## 2017-01-01 PROCEDURE — 87040 BLOOD CULTURE FOR BACTERIA: CPT | Performed by: EMERGENCY MEDICINE

## 2017-01-01 PROCEDURE — C1725 CATH, TRANSLUMIN NON-LASER: HCPCS

## 2017-01-01 PROCEDURE — 30233N1 TRANSFUSION OF NONAUTOLOGOUS RED BLOOD CELLS INTO PERIPHERAL VEIN, PERCUTANEOUS APPROACH: ICD-10-PCS | Performed by: FAMILY MEDICINE

## 2017-01-01 PROCEDURE — 87075 CULTR BACTERIA EXCEPT BLOOD: CPT | Performed by: FAMILY MEDICINE

## 2017-01-01 PROCEDURE — 77030011943

## 2017-01-01 PROCEDURE — 87077 CULTURE AEROBIC IDENTIFY: CPT | Performed by: FAMILY MEDICINE

## 2017-01-01 PROCEDURE — 85610 PROTHROMBIN TIME: CPT | Performed by: EMERGENCY MEDICINE

## 2017-01-01 PROCEDURE — 83735 ASSAY OF MAGNESIUM: CPT

## 2017-01-01 PROCEDURE — 77030031139 HC SUT VCRL2 J&J -A: Performed by: SURGERY

## 2017-01-01 PROCEDURE — 0D9P70Z DRAINAGE OF RECTUM WITH DRAINAGE DEVICE, VIA NATURAL OR ARTIFICIAL OPENING: ICD-10-PCS | Performed by: FAMILY MEDICINE

## 2017-01-01 PROCEDURE — 76010000138 HC OR TIME 0.5 TO 1 HR: Performed by: PODIATRIST

## 2017-01-01 PROCEDURE — 74011000258 HC RX REV CODE- 258: Performed by: EMERGENCY MEDICINE

## 2017-01-01 PROCEDURE — 0DJD8ZZ INSPECTION OF LOWER INTESTINAL TRACT, VIA NATURAL OR ARTIFICIAL OPENING ENDOSCOPIC: ICD-10-PCS | Performed by: INTERNAL MEDICINE

## 2017-01-01 PROCEDURE — 85018 HEMOGLOBIN: CPT | Performed by: INTERNAL MEDICINE

## 2017-01-01 PROCEDURE — 80053 COMPREHEN METABOLIC PANEL: CPT | Performed by: SURGERY

## 2017-01-01 PROCEDURE — 84484 ASSAY OF TROPONIN QUANT: CPT | Performed by: EMERGENCY MEDICINE

## 2017-01-01 PROCEDURE — 73620 X-RAY EXAM OF FOOT: CPT

## 2017-01-01 PROCEDURE — C1769 GUIDE WIRE: HCPCS

## 2017-01-01 PROCEDURE — 85025 COMPLETE CBC W/AUTO DIFF WBC: CPT | Performed by: SURGERY

## 2017-01-01 PROCEDURE — 77030003704 HC NDL VASC ACC ARMD -A: Performed by: SURGERY

## 2017-01-01 PROCEDURE — 71010 XR CHEST PORT: CPT

## 2017-01-01 PROCEDURE — 77030002986 HC SUT PROL J&J -A: Performed by: SURGERY

## 2017-01-01 PROCEDURE — 83605 ASSAY OF LACTIC ACID: CPT | Performed by: INTERNAL MEDICINE

## 2017-01-01 PROCEDURE — C1760 CLOSURE DEV, VASC: HCPCS

## 2017-01-01 PROCEDURE — 76060000034 HC ANESTHESIA 1.5 TO 2 HR: Performed by: SURGERY

## 2017-01-01 PROCEDURE — 93926 LOWER EXTREMITY STUDY: CPT

## 2017-01-01 PROCEDURE — 84100 ASSAY OF PHOSPHORUS: CPT | Performed by: INTERNAL MEDICINE

## 2017-01-01 PROCEDURE — 86923 COMPATIBILITY TEST ELECTRIC: CPT | Performed by: INTERNAL MEDICINE

## 2017-01-01 PROCEDURE — 74176 CT ABD & PELVIS W/O CONTRAST: CPT

## 2017-01-01 PROCEDURE — 76010000153 HC OR TIME 1.5 TO 2 HR: Performed by: SURGERY

## 2017-01-01 PROCEDURE — 80061 LIPID PANEL: CPT | Performed by: INTERNAL MEDICINE

## 2017-01-01 PROCEDURE — 88305 TISSUE EXAM BY PATHOLOGIST: CPT | Performed by: PODIATRIST

## 2017-01-01 PROCEDURE — 36430 TRANSFUSION BLD/BLD COMPNT: CPT

## 2017-01-01 PROCEDURE — 77030013797 HC KT TRNSDUC PRSSR EDWD -A

## 2017-01-01 PROCEDURE — 83735 ASSAY OF MAGNESIUM: CPT | Performed by: INTERNAL MEDICINE

## 2017-01-01 PROCEDURE — 65660000000 HC RM CCU STEPDOWN

## 2017-01-01 PROCEDURE — 04UH0JZ SUPPLEMENT RIGHT EXTERNAL ILIAC ARTERY WITH SYNTHETIC SUBSTITUTE, OPEN APPROACH: ICD-10-PCS | Performed by: SURGERY

## 2017-01-01 DEVICE — GRAFT VASC W0.3XL3IN THK0.36IN CLLGN ULT THN KNIT PTCH: Type: IMPLANTABLE DEVICE | Site: GROIN | Status: FUNCTIONAL

## 2017-01-01 RX ORDER — SIMVASTATIN 20 MG/1
20 TABLET, FILM COATED ORAL
COMMUNITY

## 2017-01-01 RX ORDER — HEPARIN SODIUM 1000 [USP'U]/ML
INJECTION, SOLUTION INTRAVENOUS; SUBCUTANEOUS AS NEEDED
Status: DISCONTINUED | OUTPATIENT
Start: 2017-01-01 | End: 2017-01-01 | Stop reason: HOSPADM

## 2017-01-01 RX ORDER — DEXTROSE 50 % IN WATER (D50W) INTRAVENOUS SYRINGE
12.5-25 AS NEEDED
Status: DISCONTINUED | OUTPATIENT
Start: 2017-01-01 | End: 2017-01-01 | Stop reason: HOSPADM

## 2017-01-01 RX ORDER — LOPERAMIDE HYDROCHLORIDE 2 MG/1
2 CAPSULE ORAL 4 TIMES DAILY
Status: DISPENSED | OUTPATIENT
Start: 2017-01-01 | End: 2017-01-01

## 2017-01-01 RX ORDER — INSULIN LISPRO 100 [IU]/ML
INJECTION, SOLUTION INTRAVENOUS; SUBCUTANEOUS
Status: DISCONTINUED | OUTPATIENT
Start: 2017-01-01 | End: 2017-01-01 | Stop reason: HOSPADM

## 2017-01-01 RX ORDER — FLUMAZENIL 0.1 MG/ML
0.2 INJECTION INTRAVENOUS
Status: DISCONTINUED | OUTPATIENT
Start: 2017-01-01 | End: 2017-01-01 | Stop reason: HOSPADM

## 2017-01-01 RX ORDER — SULFAMETHOXAZOLE AND TRIMETHOPRIM 800; 160 MG/1; MG/1
1 TABLET ORAL 2 TIMES DAILY
Status: DISCONTINUED | OUTPATIENT
Start: 2017-01-01 | End: 2017-01-01 | Stop reason: ALTCHOICE

## 2017-01-01 RX ORDER — FAMOTIDINE 20 MG/1
20 TABLET, FILM COATED ORAL
Status: DISCONTINUED | OUTPATIENT
Start: 2017-01-01 | End: 2017-01-01 | Stop reason: HOSPADM

## 2017-01-01 RX ORDER — LOPERAMIDE HYDROCHLORIDE 2 MG/1
4 CAPSULE ORAL ONCE
Status: COMPLETED | OUTPATIENT
Start: 2017-01-01 | End: 2017-01-01

## 2017-01-01 RX ORDER — FLUTICASONE FUROATE AND VILANTEROL 100; 25 UG/1; UG/1
1 POWDER RESPIRATORY (INHALATION) DAILY
Status: DISCONTINUED | OUTPATIENT
Start: 2017-01-01 | End: 2017-01-01 | Stop reason: HOSPADM

## 2017-01-01 RX ORDER — SODIUM CHLORIDE 9 MG/ML
50 INJECTION, SOLUTION INTRAVENOUS CONTINUOUS
Status: DISCONTINUED | OUTPATIENT
Start: 2017-01-01 | End: 2017-01-01 | Stop reason: HOSPADM

## 2017-01-01 RX ORDER — ALBUTEROL SULFATE 90 UG/1
2 AEROSOL, METERED RESPIRATORY (INHALATION)
COMMUNITY

## 2017-01-01 RX ORDER — INSULIN LISPRO 100 [IU]/ML
INJECTION, SOLUTION INTRAVENOUS; SUBCUTANEOUS EVERY 6 HOURS
Status: DISCONTINUED | OUTPATIENT
Start: 2017-01-01 | End: 2017-01-01

## 2017-01-01 RX ORDER — ALBUTEROL SULFATE 90 UG/1
2 AEROSOL, METERED RESPIRATORY (INHALATION)
Status: DISCONTINUED | OUTPATIENT
Start: 2017-01-01 | End: 2017-01-01 | Stop reason: HOSPADM

## 2017-01-01 RX ORDER — METRONIDAZOLE 500 MG/100ML
500 INJECTION, SOLUTION INTRAVENOUS EVERY 6 HOURS
Status: DISCONTINUED | OUTPATIENT
Start: 2017-01-01 | End: 2017-01-01

## 2017-01-01 RX ORDER — MIDAZOLAM HYDROCHLORIDE 1 MG/ML
INJECTION, SOLUTION INTRAMUSCULAR; INTRAVENOUS AS NEEDED
Status: DISCONTINUED | OUTPATIENT
Start: 2017-01-01 | End: 2017-01-01 | Stop reason: HOSPADM

## 2017-01-01 RX ORDER — SEVELAMER HYDROCHLORIDE 800 MG/1
1600 TABLET, FILM COATED ORAL
Status: DISCONTINUED | OUTPATIENT
Start: 2017-01-01 | End: 2017-01-01 | Stop reason: HOSPADM

## 2017-01-01 RX ORDER — SODIUM CHLORIDE 9 MG/ML
250 INJECTION, SOLUTION INTRAVENOUS AS NEEDED
Status: DISCONTINUED | OUTPATIENT
Start: 2017-01-01 | End: 2017-01-01

## 2017-01-01 RX ORDER — MIDAZOLAM HYDROCHLORIDE 1 MG/ML
.25-5 INJECTION, SOLUTION INTRAMUSCULAR; INTRAVENOUS
Status: DISCONTINUED | OUTPATIENT
Start: 2017-01-01 | End: 2017-01-01 | Stop reason: HOSPADM

## 2017-01-01 RX ORDER — HEPARIN SODIUM 1000 [USP'U]/ML
1000-5000 INJECTION, SOLUTION INTRAVENOUS; SUBCUTANEOUS
Status: DISCONTINUED | OUTPATIENT
Start: 2017-01-01 | End: 2017-01-01 | Stop reason: HOSPADM

## 2017-01-01 RX ORDER — LANOLIN ALCOHOL/MO/W.PET/CERES
325 CREAM (GRAM) TOPICAL DAILY
COMMUNITY

## 2017-01-01 RX ORDER — DIPHENHYDRAMINE HCL 25 MG
25 CAPSULE ORAL
Status: DISCONTINUED | OUTPATIENT
Start: 2017-01-01 | End: 2017-01-01 | Stop reason: HOSPADM

## 2017-01-01 RX ORDER — SULFAMETHOXAZOLE AND TRIMETHOPRIM 800; 160 MG/1; MG/1
1 TABLET ORAL 2 TIMES DAILY
COMMUNITY
Start: 2017-01-01

## 2017-01-01 RX ORDER — FENTANYL CITRATE 50 UG/ML
25-200 INJECTION, SOLUTION INTRAMUSCULAR; INTRAVENOUS
Status: DISCONTINUED | OUTPATIENT
Start: 2017-01-01 | End: 2017-01-01 | Stop reason: HOSPADM

## 2017-01-01 RX ORDER — SEVELAMER CARBONATE 800 MG/1
1600 TABLET, FILM COATED ORAL 3 TIMES DAILY
Status: DISCONTINUED | OUTPATIENT
Start: 2017-01-01 | End: 2017-01-01 | Stop reason: SDUPTHER

## 2017-01-01 RX ORDER — ACETAMINOPHEN 325 MG/1
650 TABLET ORAL
Status: DISCONTINUED | OUTPATIENT
Start: 2017-01-01 | End: 2017-01-01 | Stop reason: HOSPADM

## 2017-01-01 RX ORDER — LANOLIN ALCOHOL/MO/W.PET/CERES
325 CREAM (GRAM) TOPICAL DAILY
Status: DISCONTINUED | OUTPATIENT
Start: 2017-01-01 | End: 2017-01-01 | Stop reason: HOSPADM

## 2017-01-01 RX ORDER — FENTANYL CITRATE 50 UG/ML
100 INJECTION, SOLUTION INTRAMUSCULAR; INTRAVENOUS
Status: DISCONTINUED | OUTPATIENT
Start: 2017-01-01 | End: 2017-01-01 | Stop reason: HOSPADM

## 2017-01-01 RX ORDER — HYDROMORPHONE HYDROCHLORIDE 1 MG/ML
.25-1 INJECTION, SOLUTION INTRAMUSCULAR; INTRAVENOUS; SUBCUTANEOUS
Status: DISCONTINUED | OUTPATIENT
Start: 2017-01-01 | End: 2017-01-01 | Stop reason: HOSPADM

## 2017-01-01 RX ORDER — ALBUMIN HUMAN 250 G/1000ML
12.5 SOLUTION INTRAVENOUS
Status: DISCONTINUED | OUTPATIENT
Start: 2017-01-01 | End: 2017-01-01 | Stop reason: HOSPADM

## 2017-01-01 RX ORDER — SODIUM CHLORIDE 0.9 % (FLUSH) 0.9 %
5-10 SYRINGE (ML) INJECTION AS NEEDED
Status: DISCONTINUED | OUTPATIENT
Start: 2017-01-01 | End: 2017-01-01 | Stop reason: HOSPADM

## 2017-01-01 RX ORDER — SEVELAMER HYDROCHLORIDE 800 MG/1
800 TABLET, FILM COATED ORAL
Status: DISCONTINUED | OUTPATIENT
Start: 2017-01-01 | End: 2017-01-01 | Stop reason: HOSPADM

## 2017-01-01 RX ORDER — FENTANYL CITRATE 50 UG/ML
INJECTION, SOLUTION INTRAMUSCULAR; INTRAVENOUS AS NEEDED
Status: DISCONTINUED | OUTPATIENT
Start: 2017-01-01 | End: 2017-01-01 | Stop reason: HOSPADM

## 2017-01-01 RX ORDER — SODIUM CHLORIDE 9 MG/ML
75 INJECTION, SOLUTION INTRAVENOUS CONTINUOUS
Status: DISCONTINUED | OUTPATIENT
Start: 2017-01-01 | End: 2017-01-01

## 2017-01-01 RX ORDER — SODIUM CHLORIDE, SODIUM LACTATE, POTASSIUM CHLORIDE, CALCIUM CHLORIDE 600; 310; 30; 20 MG/100ML; MG/100ML; MG/100ML; MG/100ML
125 INJECTION, SOLUTION INTRAVENOUS CONTINUOUS
Status: DISCONTINUED | OUTPATIENT
Start: 2017-01-01 | End: 2017-01-01 | Stop reason: HOSPADM

## 2017-01-01 RX ORDER — LIDOCAINE HYDROCHLORIDE 10 MG/ML
0.1 INJECTION, SOLUTION EPIDURAL; INFILTRATION; INTRACAUDAL; PERINEURAL AS NEEDED
Status: DISCONTINUED | OUTPATIENT
Start: 2017-01-01 | End: 2017-01-01 | Stop reason: HOSPADM

## 2017-01-01 RX ORDER — SEVELAMER HYDROCHLORIDE 800 MG/1
800 TABLET, FILM COATED ORAL DAILY
COMMUNITY

## 2017-01-01 RX ORDER — EPINEPHRINE 0.1 MG/ML
1 INJECTION INTRACARDIAC; INTRAVENOUS
Status: DISCONTINUED | OUTPATIENT
Start: 2017-01-01 | End: 2017-01-01 | Stop reason: HOSPADM

## 2017-01-01 RX ORDER — DEXTROMETHORPHAN/PSEUDOEPHED 2.5-7.5/.8
1.2 DROPS ORAL
Status: DISCONTINUED | OUTPATIENT
Start: 2017-01-01 | End: 2017-01-01 | Stop reason: HOSPADM

## 2017-01-01 RX ORDER — METOPROLOL TARTRATE 25 MG/1
12.5 TABLET, FILM COATED ORAL 2 TIMES DAILY
Status: DISCONTINUED | OUTPATIENT
Start: 2017-01-01 | End: 2017-01-01 | Stop reason: ALTCHOICE

## 2017-01-01 RX ORDER — SODIUM CHLORIDE 0.9 % (FLUSH) 0.9 %
5-10 SYRINGE (ML) INJECTION AS NEEDED
Status: CANCELLED | OUTPATIENT
Start: 2017-01-01 | End: 2017-01-01

## 2017-01-01 RX ORDER — NALOXONE HYDROCHLORIDE 0.4 MG/ML
0.2 INJECTION, SOLUTION INTRAMUSCULAR; INTRAVENOUS; SUBCUTANEOUS
Status: DISCONTINUED | OUTPATIENT
Start: 2017-01-01 | End: 2017-01-01 | Stop reason: HOSPADM

## 2017-01-01 RX ORDER — HEPARIN SODIUM 5000 [USP'U]/ML
5000 INJECTION, SOLUTION INTRAVENOUS; SUBCUTANEOUS EVERY 8 HOURS
Status: DISCONTINUED | OUTPATIENT
Start: 2017-01-01 | End: 2017-01-01

## 2017-01-01 RX ORDER — ISOSORBIDE MONONITRATE 30 MG/1
30 TABLET, EXTENDED RELEASE ORAL DAILY
COMMUNITY

## 2017-01-01 RX ORDER — ATROPINE SULFATE 0.1 MG/ML
0.5 INJECTION INTRAVENOUS
Status: DISCONTINUED | OUTPATIENT
Start: 2017-01-01 | End: 2017-01-01 | Stop reason: HOSPADM

## 2017-01-01 RX ORDER — IPRATROPIUM BROMIDE AND ALBUTEROL SULFATE 2.5; .5 MG/3ML; MG/3ML
3 SOLUTION RESPIRATORY (INHALATION)
Status: DISCONTINUED | OUTPATIENT
Start: 2017-01-01 | End: 2017-01-01 | Stop reason: HOSPADM

## 2017-01-01 RX ORDER — LIDOCAINE HYDROCHLORIDE 10 MG/ML
INJECTION INFILTRATION; PERINEURAL AS NEEDED
Status: DISCONTINUED | OUTPATIENT
Start: 2017-01-01 | End: 2017-01-01 | Stop reason: HOSPADM

## 2017-01-01 RX ORDER — NALOXONE HYDROCHLORIDE 0.4 MG/ML
0.4 INJECTION, SOLUTION INTRAMUSCULAR; INTRAVENOUS; SUBCUTANEOUS
Status: DISCONTINUED | OUTPATIENT
Start: 2017-01-01 | End: 2017-01-01 | Stop reason: HOSPADM

## 2017-01-01 RX ORDER — QUETIAPINE FUMARATE 25 MG/1
12.5 TABLET, FILM COATED ORAL 2 TIMES DAILY
Status: DISCONTINUED | OUTPATIENT
Start: 2017-01-01 | End: 2017-01-01 | Stop reason: HOSPADM

## 2017-01-01 RX ORDER — ONDANSETRON 2 MG/ML
4 INJECTION INTRAMUSCULAR; INTRAVENOUS
Status: DISCONTINUED | OUTPATIENT
Start: 2017-01-01 | End: 2017-01-01 | Stop reason: HOSPADM

## 2017-01-01 RX ORDER — MIDAZOLAM HYDROCHLORIDE 1 MG/ML
2 INJECTION, SOLUTION INTRAMUSCULAR; INTRAVENOUS
Status: DISCONTINUED | OUTPATIENT
Start: 2017-01-01 | End: 2017-01-01 | Stop reason: HOSPADM

## 2017-01-01 RX ORDER — SODIUM CHLORIDE 0.9 % (FLUSH) 0.9 %
5-10 SYRINGE (ML) INJECTION EVERY 8 HOURS
Status: CANCELLED | OUTPATIENT
Start: 2017-01-01 | End: 2017-01-01

## 2017-01-01 RX ORDER — MAGNESIUM SULFATE 100 %
4 CRYSTALS MISCELLANEOUS AS NEEDED
Status: DISCONTINUED | OUTPATIENT
Start: 2017-01-01 | End: 2017-01-01 | Stop reason: HOSPADM

## 2017-01-01 RX ORDER — SEVELAMER HYDROCHLORIDE 800 MG/1
800 TABLET, FILM COATED ORAL DAILY
Status: DISCONTINUED | OUTPATIENT
Start: 2017-01-01 | End: 2017-01-01 | Stop reason: SDUPTHER

## 2017-01-01 RX ORDER — ISOSORBIDE MONONITRATE 30 MG/1
30 TABLET, EXTENDED RELEASE ORAL DAILY
Status: DISCONTINUED | OUTPATIENT
Start: 2017-01-01 | End: 2017-01-01 | Stop reason: ALTCHOICE

## 2017-01-01 RX ORDER — SODIUM CHLORIDE, SODIUM LACTATE, POTASSIUM CHLORIDE, CALCIUM CHLORIDE 600; 310; 30; 20 MG/100ML; MG/100ML; MG/100ML; MG/100ML
INJECTION, SOLUTION INTRAVENOUS
Status: DISCONTINUED | OUTPATIENT
Start: 2017-01-01 | End: 2017-01-01 | Stop reason: HOSPADM

## 2017-01-01 RX ORDER — CEFAZOLIN SODIUM IN 0.9 % NACL 2 G/50 ML
2 INTRAVENOUS SOLUTION, PIGGYBACK (ML) INTRAVENOUS
Status: COMPLETED | OUTPATIENT
Start: 2017-01-01 | End: 2017-01-01

## 2017-01-01 RX ORDER — METOPROLOL TARTRATE 25 MG/1
12.5 TABLET, FILM COATED ORAL 2 TIMES DAILY
COMMUNITY

## 2017-01-01 RX ORDER — DIPHENHYDRAMINE HYDROCHLORIDE 50 MG/ML
12.5 INJECTION, SOLUTION INTRAMUSCULAR; INTRAVENOUS AS NEEDED
Status: DISCONTINUED | OUTPATIENT
Start: 2017-01-01 | End: 2017-01-01 | Stop reason: HOSPADM

## 2017-01-01 RX ORDER — MIDAZOLAM HYDROCHLORIDE 1 MG/ML
.5-1 INJECTION, SOLUTION INTRAMUSCULAR; INTRAVENOUS
Status: DISCONTINUED | OUTPATIENT
Start: 2017-01-01 | End: 2017-01-01 | Stop reason: HOSPADM

## 2017-01-01 RX ORDER — HEPARIN SODIUM 200 [USP'U]/100ML
500 INJECTION, SOLUTION INTRAVENOUS ONCE
Status: COMPLETED | OUTPATIENT
Start: 2017-01-01 | End: 2017-01-01

## 2017-01-01 RX ORDER — SIMVASTATIN 20 MG/1
20 TABLET, FILM COATED ORAL
Status: DISCONTINUED | OUTPATIENT
Start: 2017-01-01 | End: 2017-01-01 | Stop reason: HOSPADM

## 2017-01-01 RX ORDER — LIDOCAINE HYDROCHLORIDE 10 MG/ML
10-30 INJECTION INFILTRATION; PERINEURAL
Status: DISCONTINUED | OUTPATIENT
Start: 2017-01-01 | End: 2017-01-01 | Stop reason: HOSPADM

## 2017-01-01 RX ORDER — AMLODIPINE BESYLATE 5 MG/1
10 TABLET ORAL DAILY
Status: DISCONTINUED | OUTPATIENT
Start: 2017-01-01 | End: 2017-01-01 | Stop reason: ALTCHOICE

## 2017-01-01 RX ORDER — PANTOPRAZOLE SODIUM 40 MG/1
40 TABLET, DELAYED RELEASE ORAL 2 TIMES DAILY
Status: DISCONTINUED | OUTPATIENT
Start: 2017-01-01 | End: 2017-01-01 | Stop reason: HOSPADM

## 2017-01-01 RX ORDER — PROTAMINE SULFATE 10 MG/ML
INJECTION, SOLUTION INTRAVENOUS AS NEEDED
Status: DISCONTINUED | OUTPATIENT
Start: 2017-01-01 | End: 2017-01-01 | Stop reason: HOSPADM

## 2017-01-01 RX ORDER — SEVELAMER CARBONATE 800 MG/1
1600 TABLET, FILM COATED ORAL 3 TIMES DAILY
COMMUNITY

## 2017-01-01 RX ADMIN — MEROPENEM 500 MG: 500 INJECTION, POWDER, FOR SOLUTION INTRAVENOUS at 20:45

## 2017-01-01 RX ADMIN — RENAGEL 1600 MG: 800 TABLET ORAL at 17:00

## 2017-01-01 RX ADMIN — LOPERAMIDE HYDROCHLORIDE 4 MG: 2 CAPSULE ORAL at 13:53

## 2017-01-01 RX ADMIN — FLUTICASONE FUROATE AND VILANTEROL TRIFENATATE 1 PUFF: 100; 25 POWDER RESPIRATORY (INHALATION) at 11:03

## 2017-01-01 RX ADMIN — MEROPENEM 500 MG: 500 INJECTION, POWDER, FOR SOLUTION INTRAVENOUS at 21:14

## 2017-01-01 RX ADMIN — PHENYLEPHRINE HYDROCHLORIDE 60 MCG/MIN: 10 INJECTION INTRAVENOUS at 23:00

## 2017-01-01 RX ADMIN — FLUTICASONE FUROATE AND VILANTEROL TRIFENATATE 1 PUFF: 100; 25 POWDER RESPIRATORY (INHALATION) at 08:13

## 2017-01-01 RX ADMIN — PIPERACILLIN SODIUM,TAZOBACTAM SODIUM 3.38 G: 3; .375 INJECTION, POWDER, FOR SOLUTION INTRAVENOUS at 03:30

## 2017-01-01 RX ADMIN — RENAGEL 1600 MG: 800 TABLET ORAL at 12:18

## 2017-01-01 RX ADMIN — UMECLIDINIUM 1 PUFF: 62.5 AEROSOL, POWDER ORAL at 11:56

## 2017-01-01 RX ADMIN — MIDAZOLAM HYDROCHLORIDE 1 MG: 1 INJECTION, SOLUTION INTRAMUSCULAR; INTRAVENOUS at 20:21

## 2017-01-01 RX ADMIN — RENAGEL 1600 MG: 800 TABLET ORAL at 12:28

## 2017-01-01 RX ADMIN — DEXTROSE MONOHYDRATE 12.5 G: 25 INJECTION, SOLUTION INTRAVENOUS at 08:07

## 2017-01-01 RX ADMIN — Medication 1 PACKET: at 08:07

## 2017-01-01 RX ADMIN — QUETIAPINE FUMARATE 12.5 MG: 25 TABLET ORAL at 15:32

## 2017-01-01 RX ADMIN — SODIUM CHLORIDE, SODIUM LACTATE, POTASSIUM CHLORIDE, CALCIUM CHLORIDE: 600; 310; 30; 20 INJECTION, SOLUTION INTRAVENOUS at 10:10

## 2017-01-01 RX ADMIN — Medication 1 PACKET: at 08:19

## 2017-01-01 RX ADMIN — METRONIDAZOLE 500 MG: 500 INJECTION, SOLUTION INTRAVENOUS at 17:43

## 2017-01-01 RX ADMIN — Medication 1 PACKET: at 21:16

## 2017-01-01 RX ADMIN — Medication 1 PACKET: at 08:11

## 2017-01-01 RX ADMIN — RENAGEL 1600 MG: 800 TABLET ORAL at 16:35

## 2017-01-01 RX ADMIN — Medication 1 PACKET: at 15:32

## 2017-01-01 RX ADMIN — FENTANYL CITRATE 25 MCG: 50 INJECTION, SOLUTION INTRAMUSCULAR; INTRAVENOUS at 10:35

## 2017-01-01 RX ADMIN — PHENYLEPHRINE HYDROCHLORIDE 60 MCG/MIN: 10 INJECTION INTRAVENOUS at 14:10

## 2017-01-01 RX ADMIN — MIDAZOLAM HYDROCHLORIDE 1 MG: 1 INJECTION, SOLUTION INTRAMUSCULAR; INTRAVENOUS at 08:17

## 2017-01-01 RX ADMIN — PROTAMINE SULFATE 5 MG: 10 INJECTION, SOLUTION INTRAVENOUS at 11:33

## 2017-01-01 RX ADMIN — Medication 10 ML: at 21:17

## 2017-01-01 RX ADMIN — FENTANYL CITRATE 25 MCG: 50 INJECTION, SOLUTION INTRAMUSCULAR; INTRAVENOUS at 10:26

## 2017-01-01 RX ADMIN — MIDAZOLAM HYDROCHLORIDE 1 MG: 1 INJECTION, SOLUTION INTRAMUSCULAR; INTRAVENOUS at 10:10

## 2017-01-01 RX ADMIN — LOPERAMIDE HYDROCHLORIDE 2 MG: 2 CAPSULE ORAL at 08:19

## 2017-01-01 RX ADMIN — Medication 1 PACKET: at 00:30

## 2017-01-01 RX ADMIN — PROTAMINE SULFATE 10 MG: 10 INJECTION, SOLUTION INTRAVENOUS at 11:30

## 2017-01-01 RX ADMIN — SODIUM CHLORIDE 75 ML/HR: 900 INJECTION, SOLUTION INTRAVENOUS at 17:42

## 2017-01-01 RX ADMIN — RENAGEL 1600 MG: 800 TABLET ORAL at 18:45

## 2017-01-01 RX ADMIN — ONDANSETRON 4 MG: 2 INJECTION INTRAMUSCULAR; INTRAVENOUS at 15:01

## 2017-01-01 RX ADMIN — QUETIAPINE FUMARATE 12.5 MG: 25 TABLET ORAL at 21:16

## 2017-01-01 RX ADMIN — Medication 1 PACKET: at 21:19

## 2017-01-01 RX ADMIN — FAMOTIDINE 20 MG: 20 TABLET, FILM COATED ORAL at 21:19

## 2017-01-01 RX ADMIN — PANTOPRAZOLE SODIUM 40 MG: 40 TABLET, DELAYED RELEASE ORAL at 18:53

## 2017-01-01 RX ADMIN — FENTANYL CITRATE 25 MCG: 50 INJECTION, SOLUTION INTRAMUSCULAR; INTRAVENOUS at 16:13

## 2017-01-01 RX ADMIN — MIDAZOLAM HYDROCHLORIDE 1 MG: 1 INJECTION, SOLUTION INTRAMUSCULAR; INTRAVENOUS at 08:12

## 2017-01-01 RX ADMIN — PIPERACILLIN SODIUM,TAZOBACTAM SODIUM 3.38 G: 3; .375 INJECTION, POWDER, FOR SOLUTION INTRAVENOUS at 15:22

## 2017-01-01 RX ADMIN — PHENYLEPHRINE HYDROCHLORIDE 20 MCG/MIN: 10 INJECTION INTRAVENOUS at 17:06

## 2017-01-01 RX ADMIN — PIPERACILLIN SODIUM,TAZOBACTAM SODIUM 3.38 G: 3; .375 INJECTION, POWDER, FOR SOLUTION INTRAVENOUS at 17:08

## 2017-01-01 RX ADMIN — QUETIAPINE FUMARATE 12.5 MG: 25 TABLET ORAL at 20:45

## 2017-01-01 RX ADMIN — ACETAMINOPHEN 650 MG: 325 TABLET ORAL at 07:55

## 2017-01-01 RX ADMIN — HYOSCYAMINE SULFATE: 16 SOLUTION at 09:37

## 2017-01-01 RX ADMIN — FENTANYL CITRATE 50 MCG: 50 INJECTION, SOLUTION INTRAMUSCULAR; INTRAVENOUS at 08:12

## 2017-01-01 RX ADMIN — HEPARIN SODIUM 5000 UNITS: 5000 INJECTION, SOLUTION INTRAVENOUS; SUBCUTANEOUS at 16:33

## 2017-01-01 RX ADMIN — Medication 1 PACKET: at 17:03

## 2017-01-01 RX ADMIN — RENAGEL 1600 MG: 800 TABLET ORAL at 09:00

## 2017-01-01 RX ADMIN — MIDAZOLAM HYDROCHLORIDE 1 MG: 1 INJECTION, SOLUTION INTRAMUSCULAR; INTRAVENOUS at 16:13

## 2017-01-01 RX ADMIN — METRONIDAZOLE 500 MG: 500 INJECTION, SOLUTION INTRAVENOUS at 13:34

## 2017-01-01 RX ADMIN — DEXTROSE MONOHYDRATE 12.5 G: 25 INJECTION, SOLUTION INTRAVENOUS at 13:43

## 2017-01-01 RX ADMIN — CEFAZOLIN 2 G: 1 INJECTION, POWDER, FOR SOLUTION INTRAMUSCULAR; INTRAVENOUS; PARENTERAL at 10:16

## 2017-01-01 RX ADMIN — HEPARIN SODIUM 5000 UNITS: 5000 INJECTION, SOLUTION INTRAVENOUS; SUBCUTANEOUS at 08:07

## 2017-01-01 RX ADMIN — RENAGEL 1600 MG: 800 TABLET ORAL at 08:19

## 2017-01-01 RX ADMIN — DOXYCYCLINE 100 MG: 100 INJECTION, POWDER, LYOPHILIZED, FOR SOLUTION INTRAVENOUS at 21:50

## 2017-01-01 RX ADMIN — FAMOTIDINE 20 MG: 10 INJECTION INTRAVENOUS at 20:38

## 2017-01-01 RX ADMIN — RENAGEL 1600 MG: 800 TABLET ORAL at 08:10

## 2017-01-01 RX ADMIN — SODIUM CHLORIDE 500 ML: 900 INJECTION, SOLUTION INTRAVENOUS at 14:12

## 2017-01-01 RX ADMIN — METRONIDAZOLE 500 MG: 500 INJECTION, SOLUTION INTRAVENOUS at 04:31

## 2017-01-01 RX ADMIN — Medication 10 ML: at 20:39

## 2017-01-01 RX ADMIN — SODIUM CHLORIDE 75 ML/HR: 900 INJECTION, SOLUTION INTRAVENOUS at 21:01

## 2017-01-01 RX ADMIN — METRONIDAZOLE 500 MG: 500 INJECTION, SOLUTION INTRAVENOUS at 12:36

## 2017-01-01 RX ADMIN — PIPERACILLIN SODIUM,TAZOBACTAM SODIUM 3.38 G: 3; .375 INJECTION, POWDER, FOR SOLUTION INTRAVENOUS at 04:31

## 2017-01-01 RX ADMIN — QUETIAPINE FUMARATE 12.5 MG: 25 TABLET ORAL at 20:46

## 2017-01-01 RX ADMIN — METRONIDAZOLE 500 MG: 500 INJECTION, SOLUTION INTRAVENOUS at 05:36

## 2017-01-01 RX ADMIN — MEROPENEM 500 MG: 500 INJECTION, POWDER, FOR SOLUTION INTRAVENOUS at 20:47

## 2017-01-01 RX ADMIN — RENAGEL 1600 MG: 800 TABLET ORAL at 18:53

## 2017-01-01 RX ADMIN — LOPERAMIDE HYDROCHLORIDE 2 MG: 2 CAPSULE ORAL at 12:27

## 2017-01-01 RX ADMIN — SIMVASTATIN 20 MG: 20 TABLET, FILM COATED ORAL at 21:19

## 2017-01-01 RX ADMIN — HEPARIN SODIUM 1000 UNITS: 200 INJECTION, SOLUTION INTRAVENOUS at 16:06

## 2017-01-01 RX ADMIN — IOPAMIDOL 50 ML: 755 INJECTION, SOLUTION INTRAVENOUS at 16:47

## 2017-01-01 RX ADMIN — FLUTICASONE FUROATE AND VILANTEROL TRIFENATATE 1 PUFF: 100; 25 POWDER RESPIRATORY (INHALATION) at 08:11

## 2017-01-01 RX ADMIN — QUETIAPINE FUMARATE 12.5 MG: 25 TABLET ORAL at 17:02

## 2017-01-01 RX ADMIN — SODIUM CHLORIDE 1000 ML: 900 INJECTION, SOLUTION INTRAVENOUS at 20:30

## 2017-01-01 RX ADMIN — ACETAMINOPHEN 650 MG: 325 TABLET ORAL at 23:13

## 2017-01-01 RX ADMIN — Medication 1 PACKET: at 16:21

## 2017-01-01 RX ADMIN — FENTANYL CITRATE 50 MCG: 50 INJECTION, SOLUTION INTRAMUSCULAR; INTRAVENOUS at 10:17

## 2017-01-01 RX ADMIN — HEPARIN SODIUM 3000 UNITS: 1000 INJECTION, SOLUTION INTRAVENOUS; SUBCUTANEOUS at 16:28

## 2017-01-01 RX ADMIN — FLUTICASONE FUROATE AND VILANTEROL TRIFENATATE 1 PUFF: 100; 25 POWDER RESPIRATORY (INHALATION) at 08:20

## 2017-01-01 RX ADMIN — HYOSCYAMINE SULFATE: 16 SOLUTION at 12:18

## 2017-01-01 RX ADMIN — QUETIAPINE FUMARATE 12.5 MG: 25 TABLET ORAL at 19:49

## 2017-01-01 RX ADMIN — QUETIAPINE FUMARATE 12.5 MG: 25 TABLET ORAL at 16:21

## 2017-01-01 RX ADMIN — RENAGEL 1600 MG: 800 TABLET ORAL at 08:28

## 2017-01-01 RX ADMIN — PANTOPRAZOLE SODIUM 40 MG: 40 TABLET, DELAYED RELEASE ORAL at 08:11

## 2017-01-01 RX ADMIN — HEPARIN SODIUM 5000 UNITS: 1000 INJECTION, SOLUTION INTRAVENOUS; SUBCUTANEOUS at 10:34

## 2017-01-01 RX ADMIN — Medication 10 ML: at 13:26

## 2017-01-01 RX ADMIN — SODIUM CHLORIDE, SODIUM LACTATE, POTASSIUM CHLORIDE, CALCIUM CHLORIDE: 600; 310; 30; 20 INJECTION, SOLUTION INTRAVENOUS at 11:32

## 2017-01-01 RX ADMIN — HYOSCYAMINE SULFATE: 16 SOLUTION at 11:03

## 2017-01-01 RX ADMIN — RENAGEL 800 MG: 800 TABLET ORAL at 17:42

## 2017-01-01 RX ADMIN — SIMVASTATIN 20 MG: 20 TABLET, FILM COATED ORAL at 21:52

## 2017-01-01 RX ADMIN — DOXYCYCLINE 100 MG: 100 INJECTION, POWDER, LYOPHILIZED, FOR SOLUTION INTRAVENOUS at 08:10

## 2017-01-01 RX ADMIN — SODIUM CHLORIDE 75 ML/HR: 900 INJECTION, SOLUTION INTRAVENOUS at 06:48

## 2017-01-01 RX ADMIN — MEROPENEM 500 MG: 500 INJECTION, POWDER, FOR SOLUTION INTRAVENOUS at 21:19

## 2017-01-01 RX ADMIN — MIDAZOLAM HYDROCHLORIDE 1 MG: 1 INJECTION, SOLUTION INTRAMUSCULAR; INTRAVENOUS at 20:25

## 2017-01-01 RX ADMIN — VANCOMYCIN HYDROCHLORIDE 1000 MG: 1 INJECTION, POWDER, LYOPHILIZED, FOR SOLUTION INTRAVENOUS at 18:29

## 2017-01-01 RX ADMIN — FLUTICASONE FUROATE AND VILANTEROL TRIFENATATE 1 PUFF: 100; 25 POWDER RESPIRATORY (INHALATION) at 08:26

## 2017-01-01 RX ADMIN — LOPERAMIDE HYDROCHLORIDE 2 MG: 2 CAPSULE ORAL at 00:20

## 2017-01-01 RX ADMIN — DOXYCYCLINE 100 MG: 100 INJECTION, POWDER, LYOPHILIZED, FOR SOLUTION INTRAVENOUS at 15:35

## 2017-01-01 RX ADMIN — HEPARIN SODIUM 5000 UNITS: 5000 INJECTION, SOLUTION INTRAVENOUS; SUBCUTANEOUS at 22:31

## 2017-01-01 RX ADMIN — HYOSCYAMINE SULFATE: 16 SOLUTION at 08:13

## 2017-01-01 RX ADMIN — MEROPENEM 500 MG: 500 INJECTION, POWDER, FOR SOLUTION INTRAVENOUS at 21:16

## 2017-01-01 RX ADMIN — HYOSCYAMINE SULFATE: 16 SOLUTION at 08:33

## 2017-01-01 RX ADMIN — PHENYLEPHRINE HYDROCHLORIDE 10 MCG/MIN: 10 INJECTION INTRAVENOUS at 17:41

## 2017-01-01 RX ADMIN — PHENYLEPHRINE HYDROCHLORIDE 60 MCG/MIN: 10 INJECTION INTRAVENOUS at 07:43

## 2017-01-01 RX ADMIN — FLUTICASONE FUROATE AND VILANTEROL TRIFENATATE 1 PUFF: 100; 25 POWDER RESPIRATORY (INHALATION) at 09:37

## 2017-01-01 RX ADMIN — Medication 1 PACKET: at 21:52

## 2017-01-01 RX ADMIN — SODIUM CHLORIDE 75 ML/HR: 900 INJECTION, SOLUTION INTRAVENOUS at 18:30

## 2017-01-01 RX ADMIN — RENAGEL 1600 MG: 800 TABLET ORAL at 17:03

## 2017-01-01 RX ADMIN — SODIUM CHLORIDE: 900 INJECTION, SOLUTION INTRAVENOUS at 08:12

## 2017-01-01 RX ADMIN — LIDOCAINE HYDROCHLORIDE 20 ML: 10 INJECTION, SOLUTION INFILTRATION; PERINEURAL at 16:13

## 2017-01-01 RX ADMIN — METRONIDAZOLE 500 MG: 500 INJECTION, SOLUTION INTRAVENOUS at 17:08

## 2017-01-01 RX ADMIN — Medication 10 ML: at 00:37

## 2017-01-01 RX ADMIN — Medication 1 PACKET: at 18:45

## 2017-01-01 RX ADMIN — PROTAMINE SULFATE 5 MG: 10 INJECTION, SOLUTION INTRAVENOUS at 11:28

## 2017-01-01 RX ADMIN — ACETAMINOPHEN 650 MG: 325 TABLET ORAL at 04:36

## 2017-01-01 RX ADMIN — MIDAZOLAM HYDROCHLORIDE 1 MG: 1 INJECTION, SOLUTION INTRAMUSCULAR; INTRAVENOUS at 10:23

## 2017-01-01 RX ADMIN — INSULIN LISPRO 2 UNITS: 100 INJECTION, SOLUTION INTRAVENOUS; SUBCUTANEOUS at 12:27

## 2017-01-01 RX ADMIN — PANTOPRAZOLE SODIUM 40 MG: 40 TABLET, DELAYED RELEASE ORAL at 17:34

## 2017-01-01 RX ADMIN — PHENYLEPHRINE HYDROCHLORIDE 30 MCG/MIN: 10 INJECTION INTRAVENOUS at 22:21

## 2017-01-01 RX ADMIN — FERROUS SULFATE TAB 325 MG (65 MG ELEMENTAL FE) 325 MG: 325 (65 FE) TAB at 08:10

## 2017-01-01 RX ADMIN — PIPERACILLIN SODIUM,TAZOBACTAM SODIUM 3.38 G: 3; .375 INJECTION, POWDER, FOR SOLUTION INTRAVENOUS at 04:25

## 2017-01-01 RX ADMIN — METRONIDAZOLE 500 MG: 500 INJECTION, SOLUTION INTRAVENOUS at 23:16

## 2017-01-01 RX ADMIN — METRONIDAZOLE 500 MG: 500 INJECTION, SOLUTION INTRAVENOUS at 16:01

## 2017-01-01 RX ADMIN — DEXTROSE MONOHYDRATE 25 G: 25 INJECTION, SOLUTION INTRAVENOUS at 22:58

## 2017-01-01 RX ADMIN — RENAGEL 1600 MG: 800 TABLET ORAL at 17:34

## 2017-01-01 RX ADMIN — METRONIDAZOLE 500 MG: 500 INJECTION, SOLUTION INTRAVENOUS at 11:00

## 2017-01-01 RX ADMIN — DOXYCYCLINE 100 MG: 100 INJECTION, POWDER, LYOPHILIZED, FOR SOLUTION INTRAVENOUS at 21:19

## 2017-01-01 RX ADMIN — SODIUM CHLORIDE 1000 ML: 900 INJECTION, SOLUTION INTRAVENOUS at 16:00

## 2017-01-01 RX ADMIN — METRONIDAZOLE 500 MG: 500 INJECTION, SOLUTION INTRAVENOUS at 01:53

## 2017-01-01 RX ADMIN — ALBUMIN (HUMAN) 12.5 G: 0.25 INJECTION, SOLUTION INTRAVENOUS at 12:19

## 2017-01-01 RX ADMIN — DEXTROSE MONOHYDRATE 12.5 G: 25 INJECTION, SOLUTION INTRAVENOUS at 17:34

## 2017-01-01 RX ADMIN — FAMOTIDINE 20 MG: 10 INJECTION INTRAVENOUS at 21:14

## 2017-01-01 RX ADMIN — ALBUMIN (HUMAN) 12.5 G: 0.25 INJECTION, SOLUTION INTRAVENOUS at 10:07

## 2017-01-01 RX ADMIN — PIPERACILLIN SODIUM,TAZOBACTAM SODIUM 3.38 G: 3; .375 INJECTION, POWDER, FOR SOLUTION INTRAVENOUS at 17:17

## 2017-01-01 RX ADMIN — VANCOMYCIN HYDROCHLORIDE 500 MG: 500 INJECTION, POWDER, LYOPHILIZED, FOR SOLUTION INTRAVENOUS at 00:37

## 2017-01-01 RX ADMIN — Medication 1 PACKET: at 22:32

## 2017-01-01 RX ADMIN — METRONIDAZOLE 500 MG: 500 INJECTION, SOLUTION INTRAVENOUS at 23:00

## 2017-06-23 NOTE — PROGRESS NOTES
[facility]: MED RECONCILIATION    Comments/Recommendations:    Patient was alert and oriented   Medications are managed by patient's daughter  [facility] Verified no new allergies to medications   Daughter reported that patient's ferrous sulfate 325mg was increased to twice a day dosing   Daughter also stated patient has a standing Rx for albuterol 0.63mg/3mL nebulizer, Miralax, and Nitroglycerin 0.4mg SL but has not used them in weeks or longer    Medications added:     · Metoprolol tartrate 12.5mg BID    Medications removed:    · Gabapentin 100mg    Medications adjusted:    · Ferrous Sulfate 325mg (increased to twice a day dosing)    Information obtained from: Rx Query, Patient's daughter, [facility] representative Jaret Regalado ([phone])      Allergies: Review of patient's allergies indicates no known allergies. Prior to Admission Medications:     Medication Documentation Review Audit       Reviewed by Lizabeth George (Pharmacy Student) on 06/23/17 at 1730         Medication Sig Documenting Provider Last Dose Status Taking? amLODIPine (NORVASC) 10 mg tablet Take 10 mg by mouth daily. Historical Provider 6/23/2017 AM Active Yes    ferrous sulfate 325 mg (65 mg iron) tablet Take 325 mg by mouth two (2) times a day. Historical Provider 6/23/2017 AM Active Yes    fluticasone-salmeterol (ADVAIR DISKUS) 250-50 mcg/dose diskus inhaler Take 1 Puff by inhalation every twelve (12) hours. Historical Provider 6/22/2017 PM Active Yes    hydrALAZINE (APRESOLINE) 50 mg tablet Take 100 mg by mouth every eight (8) hours. Historical Provider 6/23/2017 AM Active Yes    isosorbide mononitrate ER (IMDUR) 30 mg tablet Take 30 mg by mouth daily. Debo Villatoro MD 6/23/2017 AM Active Yes    metoprolol tartrate (LOPRESSOR) 25 mg tablet Take 12.5 mg by mouth two (2) times a day. Historical Provider 6/23/2017 AM Active Yes    pantoprazole (PROTONIX) 40 mg tablet Take 40 mg by mouth two (2) times a day.  Historical Provider 6/23/2017 AM Active Yes simvastatin (ZOCOR) 10 mg tablet Take 10 mg by mouth nightly. Historical Provider 6/22/2017 PM Active Yes    tiotropium (SPIRIVA) 18 mcg inhalation capsule Take 1 Cap by inhalation daily.  Historical Provider 6/23/2017 AM Active Yes                        [address]: [phone]

## 2017-06-23 NOTE — ED PROVIDER NOTES
HPI Comments: 71 y.o. female with past medical history significant for HTN, CAD, type 2 DM, ERSD and CKD, CHF, COPD who presents accompanied by family member for evaluation of falls. Family member reports patient has had recurrent falls due to feeling off-balance for the past few weeks, stating she fell last night and hit her head. Family member denies LOC. Family member states patient has been having dark stools. Family member states patient has had a blood transfusion in the past at [facility] when she had hematochezia. Patient is currently taking iron supplements. Patient has dialysis Tues-Thurs-Sat and was dialyzed yesterday without complications. There are no other acute medical concerns at this time. Social hx: former smoker, no alcohol  PCP: Paramjit Marks MD    Note written by Anup Lynch, as dictated by Kaylen Batres MD 4:37 PM       The history is provided by the patient and a relative. No  was used. Past Medical History:   Diagnosis Date    CAD (coronary artery disease)     Chronic kidney disease     on dialysis -     Chronic obstructive pulmonary disease (HCC)     emphysema    Chronic respiratory failure with hypoxia (HCC)     Diabetes (HCC)     Diastolic CHF, chronic (HCC)     DM type 2 causing renal disease (HCC)     [facility]D [facility]end stage renal disease) ([facility] [address]     GERD (gastroesophageal reflux disease)     Hypertension     Thyroid disease     hyopthyroidism       Past Surgical History:   Procedure Laterality Date    HX AORTIC VALVE REPLACEMENT      HX CORONARY ARTERY BYPASS GRAFT      HX TUBAL LIGATION           Family History:   Problem Relation Age of Onset    Heart Disease Mother     Stroke Mother        Social History     Social History    Marital status:      Spouse name: N/A    Number of children: N/A    Years of education: N/A     Occupational History    Not on file.      Social History Main Topics    Smoking status: Former Smoker     Packs/day: 0.50     Quit date: 4/29/2015    Smokeless tobacco: Never Used    Alcohol use No    Drug use: No    Sexual activity: Not on file     Other Topics Concern    Not on file     Social History Narrative         ALLERGIES: Review of patient's allergies indicates no known allergies. Review of Systems   Constitutional: Negative for chills and fever. HENT: Negative for ear pain and sore throat. Eyes: Negative for photophobia and pain. Respiratory: Negative for chest tightness and shortness of breath. Cardiovascular: Negative for chest pain and leg swelling. Gastrointestinal: Negative for abdominal pain, nausea and vomiting. Dark stools   Genitourinary: Negative for dysuria and flank pain. Musculoskeletal: Negative for back pain and neck pain. Skin: Negative for rash and wound. Neurological: Negative for dizziness, light-headedness and headaches. Feeling off balance       Vitals:    06/23/17 1556   BP: 100/49   Pulse: (!) 50   Resp: 24   Temp: 98.2 °F (36.8 °C)   SpO2: 93%   Weight: 54.4 kg (120 lb)   Height: 5' 6\" (1.676 m)            Physical Exam   Constitutional: She appears well-developed and well-nourished. No distress. HENT:   Head: Normocephalic and atraumatic. Mouth/Throat: Oropharynx is clear and moist.   Eyes: Conjunctivae and EOM are normal. Pupils are equal, round, and reactive to light. Neck: Normal range of motion. Cardiovascular: Regular rhythm, normal heart sounds and intact distal pulses. Bradycardia present. No murmur heard. Pulmonary/Chest: Effort normal and breath sounds normal. No stridor. No respiratory distress. Wears NC oxygen 24/7   Abdominal: Soft. Bowel sounds are normal. There is no tenderness. Musculoskeletal: Normal range of motion. She exhibits no edema or tenderness. Neurological: She is alert. No cranial nerve deficit. Skin: Skin is warm and dry. She is not diaphoretic.    Psychiatric: She has a normal mood and affect. Nursing note and vitals reviewed. MDM  Number of Diagnoses or Management Options  ESRD (end stage renal disease) ([facility] [address]):   Weakness:   Diagnosis management comments: Patient with weakness and falls, patient labs unchanged from previous except for lower H/H  Patient and family advised to discuss this with the nephrologist about adding other meds to boost her HGB.   No EMC at this time, safe for d/c to home with family and discussed ways to help prevent falls at home with patient and family       Amount and/or Complexity of Data Reviewed  Clinical lab tests: ordered and reviewed      ED Course       Procedures

## 2017-06-23 NOTE — ED NOTES
Patient given discharge instruction by Magdalena Waldron. Verbalized understanding, pt discharge home with family and taken out in a wheelchair.

## 2017-06-23 NOTE — DISCHARGE INSTRUCTIONS
Chronic Kidney Disease: Care Instructions  Your Care Instructions  Chronic kidney disease happens when your kidneys don't work as well as they should. Your kidneys have a few important jobs. They remove waste from your blood. This waste leaves your body in your urine. They also balance your body's fluids and chemicals. When your kidneys don't work well, extra waste and fluid can build up. This can poison the body and sometimes cause death. The most common causes of this disease are diabetes and high blood pressure. In some cases, the disease develops in 2 to 3 months. But it usually develops over many years. If you take medicine and make healthy changes to your lifestyle, you may be able to prevent the disease from getting worse. But if your kidney damage gets worse, you may need dialysis or a kidney transplant. Dialysis uses a machine to filter waste from the blood. A transplant is surgery to give you a healthy kidney from another person. Follow-up care is a key part of your treatment and safety. Be sure to make and go to all appointments, and call your doctor if you are having problems. It's also a good idea to know your test results and keep a list of the medicines you take. How can you care for yourself at home? Treatments and appointments  · Be safe with medicines. Take your medicines exactly as prescribed. Call your doctor if you have any problems with your medicine. You also may take medicine to control your blood pressure or to treat diabetes. Many people who have diabetes take blood pressure medicine. · If you have diabetes, do your best to keep your blood sugar in your target range. You may do this by eating healthy food and exercising. You may also take medicines. · Go to your dialysis appointments if you have this treatment. · Do not take ibuprofen (Advil, Motrin), naproxen (Aleve), or similar medicines, unless your doctor tells you to. These may make the disease worse.   · Do not take any vitamins, over-the-counter medicines, or herbal products without talking to your doctor first.  · Do not smoke or use other tobacco products. Smoking can reduce blood flow to the kidneys. If you need help quitting, talk to your doctor about stop-smoking programs and medicines. These can increase your chances of quitting for good. · Do not drink alcohol or use illegal drugs. · Talk to your doctor about an exercise plan. Exercise helps lower your blood pressure. It also makes you feel better. · If you have an advance directive, let your doctor know. It may include a living will and a durable power of  for health care. If you don't have one, you may want to prepare one. It lets your doctor and loved ones know your health care wishes if you become unable to speak for yourself. Diet  · Talk to a registered dietitian. He or she can help you make a meal plan that is right for you. Most people with kidney disease need to limit salt (sodium), fluids, and protein. Some also have to limit potassium and phosphorus. · You may have to give up many foods you like. But try to focus on the fact that this will help you stay healthy for as long as possible. · If you have a hard time eating enough, talk to your doctor or dietitian about ways to add calories to your diet. · Your diet may change as your disease changes. See your doctor for regular testing. And work with a dietitian to change your diet as needed. When should you call for help? Call 911 anytime you think you may need emergency care. For example, call if:  · You passed out (lost consciousness). Call your doctor now or seek immediate medical care if:  · You have less urine than normal or no urine. · You have trouble urinating or can urinate only very small amounts. · You are confused or have trouble thinking clearly. · You feel weaker or more tired than usual.  · You are very thirsty, lightheaded, or dizzy. · You have nausea and vomiting.   · You have new swelling of your arms or feet, or your swelling is worse. · You have blood in your urine. · You have new or worse trouble breathing. Watch closely for changes in your health, and be sure to contact your doctor if:  · You have any problems with your medicine or other treatment. Where can you learn more? Go to http://yany-alvarez.info/. Enter N276 in the search box to learn more about \"Chronic Kidney Disease: Care Instructions. \"  Current as of: April 3, 2017  Content Version: 11.3  © 1030-2505 [employer]. Care instructions adapted under license by [employer] (which disclaims liability or warranty for this information). If you have questions about a medical condition or this instruction, always ask your healthcare professional. [address] any warranty or liability for your use of this information. Diet for End-Stage Renal Disease (Dialysis): Care Instructions  Your Care Instructions  You need to change your diet when you are on dialysis for end-stage renal disease (kidney failure). You will need more protein than you did before you started dialysis. You may need to limit salt and fluids. You also may need to limit minerals such as potassium and phosphorus. A diet for end-stage renal disease takes planning. A dietitian who specializes in kidney disease can help you plan meals that meet your needs. Your nutrition needs depend on the type of dialysis you get. Talk with your doctor or dietitian to make sure your diet is right for your condition. Do not change your diet without talking to your doctor or dietitian. Follow-up care is a key part of your treatment and safety. Be sure to make and go to all appointments, and call your doctor if you are having problems. It's also a good idea to know your test results and keep a list of the medicines you take. How can you care for yourself at home?   · Work with your doctor or dietitian to create a food plan that guides your daily food choices. · Do not skip meals or go for many hours without eating. If you do not feel very hungry, try to eat 4 or 5 small meals instead of 1 or 2 big meals. · If you have a hard time eating enough, talk to your doctor or dietitian about ways you can add calories to your diet. · Do not take any vitamins or minerals, supplements, or herbal products without talking to your doctor first.  · Check with your doctor about whether it is safe for you to drink alcohol. · Check with your doctor about how much fluid you can drink each day. Drinking a lot of fluid can cause you to gain too much water weight between dialysis sessions. To get the right amount of protein  · Ask your doctor or dietitian how much protein you can have each day. You will probably need more protein while you are on dialysis than you did before you started dialysis. · Choose high-quality protein sources, such as lean meat, poultry, and fish. To limit salt  · Read food labels on cans and food packages. The labels tell you how much sodium is in each serving. Make sure that you look at the serving size. If you eat more than the serving size, you will get more sodium than what is listed on the label. · Do not add salt to your food. Avoid foods that list salt, sodium, or monosodium glutamate (MSG) as an ingredient. · Buy foods that are labeled \"no salt added,\" \"sodium-free,\" or \"low-sodium. \" Foods labeled \"reduced-sodium\" and \"light sodium\" may still have too much sodium. · Limit processed foods, fast food, and restaurant foods. These types of food are very high in sodium. · Avoid salted pretzels, chips, popcorn, and other salted snacks. · Avoid smoked, cured, salted, and canned meat, fish, and poultry. This includes ham, ram, hot dogs, and luncheon meats. · You may use lemon, herbs, and spices to flavor your meals. To limit fluids  · Know how much fluid you can drink.  Every day fill a pitcher with that amount of water. If you drink another fluid (such as coffee) that day, pour an equal amount out of the pitcher. · Count foods that are liquid at room temperature, such as gelatin dessert and ice cream, as fluids. To limit potassium  · Choose low-potassium fruits such as blueberries and raspberries. · Choose low-potassium vegetables such as cucumber and radishes. · Do not use a salt substitute or lite salt unless your doctor says it is okay. Most salt substitutes and lite salts are high in potassium. To limit phosphorus  · Follow your food plan to know how much milk and milk products you can have. · Avoid nuts, peanut butter, seeds, lentils, beans, organ meats, and sardines. · Avoid cola drinks. · Avoid bran breads or bran cereals. · Take phosphate binders as directed, if prescribed by your doctor. Where can you learn more? Go to http://yany-alvarez.info/. Enter H773 in the search box to learn more about \"Diet for End-Stage Renal Disease (Dialysis): Care Instructions. \"  Current as of: July 26, 2016  Content Version: 11.3  © 1662-3272 [employer]. Care instructions adapted under license by [employer] (which disclaims liability or warranty for this information). If you have questions about a medical condition or this instruction, always ask your healthcare professional. [place] [address] any warranty or liability for your use of this information. Weakness: Care Instructions  Your Care Instructions  Weakness is a lack of physical or muscle strength. You may feel that you need to make extra effort to move your arms, legs, or other muscles. Generalized weakness means that you feel weak in most areas of your body. Another type of weakness may affect just one muscle or group of muscles. You may feel weak and tired after you have done too much activity, such as taking an extra-long hike. This is not a serious problem.  It often goes away on its own. Feeling weak can also be caused by medical conditions like thyroid problems, depression, or a virus. Sometimes the cause can be serious. Your doctor may want to do more tests to try to find the cause of the weakness. The doctor has checked you carefully, but problems can develop later. If you notice any problems or new symptoms, get medical treatment right away. Follow-up care is a key part of your treatment and safety. Be sure to make and go to all appointments, and call your doctor if you are having problems. It's also a good idea to know your test results and keep a list of the medicines you take. How can you care for yourself at home? · Rest when you feel tired. · Be safe with medicines. If your doctor prescribed medicine, take it exactly as prescribed. Call your doctor if you think you are having a problem with your medicine. You will get more details on the specific medicines your doctor prescribes. · Do not skip meals. Eating a balanced diet may increase your energy level. · Get some physical activity every day, but do not get too tired. When should you call for help? Call your doctor now or seek immediate medical care if:  · You have new or worse weakness. · You are dizzy or lightheaded, or you feel like you may faint. Watch closely for changes in your health, and be sure to contact your doctor if:  · You do not get better as expected. Where can you learn more? Go to http://yany-alvarez.info/. Enter [id number] [id number] in the search box to learn more about \"Weakness: Care Instructions. \"  Current as of: March 20, 2017  Content Version: 11.3  © 5444-9692 [employer]. Care instructions adapted under license by [employer] (which disclaims liability or warranty for this information).  If you have questions about a medical condition or this instruction, always ask your healthcare professional. Shari Evans disclaims any warranty or liability for your use of this information. We hope that we have addressed all of your medical concerns. The examination and treatment you received in the Emergency Department were for an emergent problem and were not intended as complete care. It is important that you follow up with your healthcare provider(s) for ongoing care. If your symptoms worsen or do not improve as expected, and you are unable to reach your usual health care provider(s), you should return to the Emergency Department. Today's healthcare is undergoing tremendous change, and patient satisfaction surveys are one of the many tools to assess the quality of medical care. You may receive a survey from the [employer] regarding your experience in the Emergency Department. I hope that your experience has been completely positive, particularly the medical care that I provided. As such, please participate in the survey; anything less than excellent does not meet my expectations or intentions. [address] [place] [address] and [employer] participate in nationally recognized quality of care measures. If your blood pressure is greater than 120/80, as reported below, we urge that you seek medical care to address the potential of high blood pressure, commonly known as hypertension. Hypertension can be hereditary or can be caused by certain medical conditions, pain, stress, or \"white coat syndrome. \"       Please make an appointment with your health care provider(s) for follow up of your Emergency Department visit. VITALS:   Patient Vitals for the past 8 hrs:   Temp Pulse Resp BP SpO2   06/23/17 1556 98.2 °F (36.8 °C) (!) 50 24 100/49 93 %          Thank you for allowing us to provide you with medical care today. We realize that you have many choices for your emergency care needs. Please choose us in the future for any continued health care needs. Jayla Monsalve, MD    [address] [facility].   Office: [phone]            Recent Results (from the past 24 hour(s))   CBC WITH AUTOMATED DIFF    Collection Time: 06/23/17  4:20 PM   Result Value Ref Range    WBC 5.8 3.6 - 11.0 K/uL    RBC 3.45 (L) 3.80 - 5.20 M/uL    HGB 10.3 (L) 11.5 - 16.0 g/dL    HCT 32.6 (L) 35.0 - 47.0 %    MCV 94.5 80.0 - 99.0 FL    MCH 29.9 26.0 - 34.0 PG    MCHC 31.6 30.0 - 36.5 g/dL    RDW 19.1 (H) 11.5 - 14.5 %    PLATELET 369 510 - 463 K/uL    NEUTROPHILS 79 (H) 32 - 75 %    LYMPHOCYTES 11 (L) 12 - 49 %    MONOCYTES 9 5 - 13 %    EOSINOPHILS 1 0 - 7 %    BASOPHILS 0 0 - 1 %    ABS. NEUTROPHILS 4.6 1.8 - 8.0 K/UL    ABS. LYMPHOCYTES 0.6 (L) 0.8 - 3.5 K/UL    ABS. MONOCYTES 0.5 0.0 - 1.0 K/UL    ABS. EOSINOPHILS 0.1 0.0 - 0.4 K/UL    ABS. BASOPHILS 0.0 0.0 - 0.1 K/UL    DF SMEAR SCANNED      RBC COMMENTS HYPOCHROMIA  1+        RBC COMMENTS ANISOCYTOSIS  1+       METABOLIC PANEL, COMPREHENSIVE    Collection Time: 06/23/17  4:20 PM   Result Value Ref Range    Sodium 142 136 - 145 mmol/L    Potassium 4.1 3.5 - 5.1 mmol/L    Chloride 100 97 - 108 mmol/L    CO2 29 21 - 32 mmol/L    Anion gap 13 5 - 15 mmol/L    Glucose 115 (H) 65 - 100 mg/dL    BUN 25 (H) 6 - 20 MG/DL    Creatinine 5.18 (H) 0.55 - 1.02 MG/DL    BUN/Creatinine ratio 5 (L) 12 - 20      GFR est AA 10 (L) >60 ml/min/1.73m2    GFR est non-AA 8 (L) >60 ml/min/1.73m2    Calcium 9.5 8.5 - 10.1 MG/DL    Bilirubin, total 0.6 0.2 - 1.0 MG/DL    ALT (SGPT) 22 12 - 78 U/L    AST (SGOT) 21 15 - 37 U/L    Alk.  phosphatase 135 (H) 45 - 117 U/L    Protein, total 8.0 6.4 - 8.2 g/dL    Albumin 3.1 (L) 3.5 - 5.0 g/dL    Globulin 4.9 (H) 2.0 - 4.0 g/dL    A-G Ratio 0.6 (L) 1.1 - 2.2     PROTHROMBIN TIME + INR    Collection Time: 06/23/17  4:20 PM   Result Value Ref Range    INR 1.4 (H) 0.9 - 1.1      Prothrombin time 14.5 (H) 9.0 - 11.1 sec   OCCULT BLOOD, STOOL    Collection Time: 06/23/17  4:41 PM   Result Value Ref Range    Occult blood, stool NEGATIVE  NEG         No results found.

## 2017-06-23 NOTE — ED TRIAGE NOTES
Pt has been falling lately, hit her head last night. Pt went to doctor who sent her here for low pulse.  Pt also has \"dark stools\"

## 2017-07-12 PROBLEM — R53.83 LETHARGY: Status: ACTIVE | Noted: 2017-01-01

## 2017-07-12 PROBLEM — A41.9 SEPSIS (HCC): Status: ACTIVE | Noted: 2017-01-01

## 2017-07-12 PROBLEM — L97.509 DM FOOT ULCER (HCC): Status: ACTIVE | Noted: 2017-01-01

## 2017-07-12 PROBLEM — E11.621 DM FOOT ULCER (HCC): Status: ACTIVE | Noted: 2017-01-01

## 2017-07-12 PROBLEM — R53.81 DEBILITATED PATIENT: Status: ACTIVE | Noted: 2017-01-01

## 2017-07-12 PROBLEM — R19.7 DIARRHEA: Status: ACTIVE | Noted: 2017-01-01

## 2017-07-12 PROBLEM — D72.829 LEUKOCYTOSIS: Status: ACTIVE | Noted: 2017-01-01

## 2017-07-12 PROBLEM — R62.7 FTT (FAILURE TO THRIVE) IN ADULT: Status: ACTIVE | Noted: 2017-01-01

## 2017-07-12 PROBLEM — R57.9 SHOCK (HCC): Status: ACTIVE | Noted: 2017-01-01

## 2017-07-12 PROBLEM — R11.10 VOMITING: Status: ACTIVE | Noted: 2017-01-01

## 2017-07-12 NOTE — CONSULTS
Podiatric Surgery Consultation  Assessment/Plan: gas gangrene LT medial foot  - Pt evaluated and tx.  - Bedside I&D / wide debridement performed this PM to help stabilize patient, see op note, deep anaerobic and aerobic wound cultures taken, will take to OR at 0800 tomorrow for formal LT 1st ray amputation with Dr. Ash Kyle. May need further amputation / debridement depending on if gangrene continues to progress. - Will likely need vascular workup as well, will consult Epi Baker MD to f/u on whether or not she needs intervention. - DSG BW2D now, will order 1/4 strength Dakins, can apply Dakins W2D when it arrives. - Abx per primary team  - Thank you for this consultation. Please do not hesitate to call with any questions. Pre-debridement / I&D    Subjective:  Pt complains of wounds to LT medial foot / hallux. Previous tx include local care and 3 weeks of Bactrim. Neg for fever, chills, nausea, vomiting, chest pain, shortness of breath, + diarrhea and malaise for past few weeks. HPI:  Pt had been following with vascular sx / Dr. Wei Youngblood for PAD, who had noted she likely needed vascular intervention and potentially amputation of her LT 1st ray.   Over     History:  Sepsis ([address]  Gas Gangrene Left Foot  No Known Allergies  Family History   Problem Relation Age of Onset    Heart Disease Mother     Stroke Mother       Past Medical History:   Diagnosis Date    CAD (coronary artery disease)     Chronic obstructive pulmonary disease ([address]     emphysema    Chronic respiratory failure with hypoxia (HCC)     Diastolic CHF, chronic ([address]     DM type 2 causing renal disease ([address]     ESRD (end stage renal disease) (HCC)     GERD (gastroesophageal reflux disease)     Hypertension     Hypothyroidism      Past Surgical History:   Procedure Laterality Date    HX AORTIC VALVE REPLACEMENT      HX CORONARY ARTERY BYPASS GRAFT      HX TUBAL LIGATION Social History   Substance Use Topics    Smoking status: Former Smoker     Packs/day: 0.50     Quit date: 4/29/2015    Smokeless tobacco: Never Used    Alcohol use No       History   Alcohol Use No     History   Drug Use No      History   Smoking Status    Former Smoker    Packs/day: 0.50    Quit date: 4/29/2015   Smokeless Tobacco    Never Used     Current Facility-Administered Medications   Medication Dose Route Frequency    sodium chloride (NS) flush 5-10 mL  5-10 mL IntraVENous PRN    vancomycin (VANCOCIN) 1,000 mg in 0.9% sodium chloride (MBP/ADV) 250 mL  1,000 mg IntraVENous NOW    sodium chloride 0.9 % bolus infusion 1,000 mL  1,000 mL IntraVENous ONCE    [START ON 7/13/2017] piperacillin-tazobactam (ZOSYN) 3.375 g in 0.9% sodium chloride (MBP/ADV) 100 mL  3.375 g IntraVENous Q12H    metroNIDAZOLE (FLAGYL) IVPB premix 500 mg  500 mg IntraVENous Q6H    albuterol-ipratropium (DUO-NEB) 2.5 MG-0.5 MG/3 ML  3 mL Nebulization Q6H PRN    glucose chewable tablet 16 g  4 Tab Oral PRN    dextrose (D50W) injection syrg 12.5-25 g  12.5-25 g IntraVENous PRN    glucagon (GLUCAGEN) injection 1 mg  1 mg IntraMUSCular PRN    0.9% sodium chloride infusion  75 mL/hr IntraVENous CONTINUOUS    acetaminophen (TYLENOL) tablet 650 mg  650 mg Oral Q4H PRN    diphenhydrAMINE (BENADRYL) capsule 25 mg  25 mg Oral Q4H PRN    ondansetron (ZOFRAN) injection 4 mg  4 mg IntraVENous Q4H PRN    heparin (porcine) injection 5,000 Units  5,000 Units SubCUTAneous Q8H    insulin lispro (HUMALOG) injection   SubCUTAneous Q6H    [START ON 7/13/2017] fluticasone-vilanterol (BREO ELLIPTA) 100mcg-25mcg/puff  1 Puff Inhalation DAILY        Objective:  Vitals: Patient Vitals for the past 12 hrs:   BP Temp Pulse Resp SpO2 Height Weight   07/12/17 1530 95/45 - (!) 57 - 90 % - -   07/12/17 1430 96/40 - (!) 58 - 96 % - -   07/12/17 1400 (!) 89/45 - - - 96 % - -   07/12/17 1318 (!) 89/39 98.3 °F (36.8 °C) 61 28 90 % 5' 6\" (1.676 m) 56.7 kg (125 lb)       Vascular:  B/L LE  DP 0/4; PT 0/4  capillary fill time brisk, pitting edema is present, skin temperature is cool, varicosities are present. Skin of LT hallux ice cold. Dermatological:  Nails are thickened, elongated, discolored, painful to palpation, 3 mm thick, with subungual debris. Skin is dry and scaly, exhibits hemosiderin deposition. Skin cracks present at heels b/l. There is no maceration of the interspaces of the feet b/l. Wound: (multiple)  Location: over LT 1st ray / hallux / interspace  Margins: rolled, dusky  Drainage: moderate brown orange diswater type pus  Odor: notable anaerobic odor  Wound base: necrotic / fibrotic / granular  Lymphangitic streaking? No.  Undermining? Yes, at lest 1 cm all directions, the present ulcerations all connect via deep probe  Sinus tracts? No.  Exposed bone? No.  Subcutaneous crepitation on palpation? No.    Neurological:  DTR are present, protective sensation per 5.07 [place]on Kenan monofilament is absent, patient is AAOx3, mood is normal. Epicritic sensation is intact. Orthopedic:  B/L LE are symmetric, ROM of ankle, STJ, 1st MTPJ is limited, MMT 5 out of 5 for B/L LE. Constitutional: Pt is a well developed thin elderly AAF. Lymphatics: negative tenderness to palpation of neck/axillary/inguinal nodes. Imaging / Cx / Px:  LT foot XR:   FINDINGS:  Three views of the left foot demonstrate no fracture or other acute  osseous or articular abnormality. Few soft tissue swelling. Calcaneal spurs. .  Small vessels calcification, there is evidence of ulceration and likely gas in  the soft tissues in the first and second metatarsal region.     IMPRESSION:  No definite evidence to suggest osteomyelitis by this technique. .    Labs:  Recent Labs      07/12/17   1402   WBC  28.2*   CREA  5.60*   BUN  32*   GLU  115*   HGB  10.0*   HCT  31.3*   NA  137   K  4.1   CL  100   CO2  26

## 2017-07-12 NOTE — H&P
[facility]  [address]  [phone]    Hospitalist Admission Note      NAME:  Cristal Webber   :   1945   MRN:  [id number]     PCP:  Marleni Chacon MD     Date/Time:  2017 3:38 PM          Subjective:     CHIEF COMPLAINT: fatigue, diarrhea    HISTORY OF PRESENT ILLNESS:     Ms. Honorio Dockery is a 71 y.o.  female who presented to the Emergency Department complaining of fatigue and diarrhea. She provides minimal hx. Per family, diarrhea for >4weeks. Lower abd pain for 2 days, with bilious vomiting last night  L great toe/Foot infection, not improving on Bactrim x3 weeks. Tolerating HD up to yesterday, but today in septic shock. Falling at home. We will admit her for management. Past Medical History:   Diagnosis Date    CAD (coronary artery disease)     Chronic obstructive pulmonary disease (HCC)     emphysema    Chronic respiratory failure with hypoxia (HCC)     Diastolic CHF, chronic (HCC)     DM type 2 causing renal disease (HCC)     ESRD (end stage renal disease) (HCC)     GERD (gastroesophageal reflux disease)     Hypertension     Hypothyroidism         Past Surgical History:   Procedure Laterality Date    HX AORTIC VALVE REPLACEMENT      HX CORONARY ARTERY BYPASS GRAFT      HX TUBAL LIGATION         Social History   Substance Use Topics    Smoking status: Former Smoker     Packs/day: 0.50     Quit date: 2015    Smokeless tobacco: Never Used    Alcohol use No        Family History   Problem Relation Age of Onset    Heart Disease Mother     Stroke Mother         No Known Allergies     Prior to Admission medications    Medication Sig Start Date End Date Taking? Authorizing Provider   SITagliptin (JANUVIA) 25 mg tablet Take 25 mg by mouth daily. Yes Debo Villatoro MD   sevelamer carbonate (RENVELA) 800 mg tab tab Take  by mouth three (3) times daily.    Yes Debo Villatoro MD   isosorbide mononitrate ER (IMDUR) 30 mg tablet Take 30 mg by mouth daily. Yes Phys Other, MD   metoprolol tartrate (LOPRESSOR) 25 mg tablet Take 12.5 mg by mouth two (2) times a day. Yes Historical Provider   tiotropium (SPIRIVA) 18 mcg inhalation capsule Take 1 Cap by inhalation daily. Yes Historical Provider   simvastatin (ZOCOR) 10 mg tablet Take 10 mg by mouth nightly. Yes Historical Provider   fluticasone-salmeterol (ADVAIR DISKUS) 250-50 mcg/dose diskus inhaler Take 1 Puff by inhalation every twelve (12) hours. Yes Historical Provider   pantoprazole (PROTONIX) 40 mg tablet Take 40 mg by mouth two (2) times a day. Yes Historical Provider   amLODIPine (NORVASC) 10 mg tablet Take 10 mg by mouth daily. Yes Historical Provider   ferrous sulfate 325 mg (65 mg iron) tablet Take 325 mg by mouth two (2) times a day.     Historical Provider       Review of Systems:  (bold if positive, if negative)    Gen:  Eyes:  ENT:  CVS:  Pulm:  GI:    :    MS:  Skin:  Psych:  Endo:    Hem:  Renal:    Neuro:        Objective:      VITALS:    Vital signs reviewed; most recent are:    Visit Vitals    BP 95/45    Pulse (!) 57    Temp 98.3 °F (36.8 °C)    Resp 28    Ht 5' 6\" (1.676 m)    Wt 56.7 kg (125 lb)    SpO2 90%    BMI 20.18 kg/m2     SpO2 Readings from Last 6 Encounters:   07/12/17 90%   06/23/17 90%   07/29/15 92%   07/02/15 100%   05/31/13 98%   05/14/13 94%    O2 Flow Rate (L/min): 3 l/min   No intake or output data in the 24 hours ending 07/12/17 5778     Exam:     Physical Exam:    Gen:  Well-developed, well-nourished, in no acute distress  HEENT:  Pink conjunctivae, PERRL, hearing intact to voice, moist mucous membranes  Neck:  Supple, without masses, thyroid non-tender  Resp:  No accessory muscle use, clear breath sounds without wheezes rales or rhonchi  Card:  No murmurs, normal S1, S2 without thrills, bruits or peripheral edema  Abd:  Soft, non-tender, non-distended, normoactive bowel sounds are present, no palpable organomegaly and no detectable hernias  Lymph:  No cervical or inguinal adenopathy  Musc:  No cyanosis or clubbing  Skin:  No rashes or ulcers, skin turgor is good  Neuro:  Cranial nerves are grossly intact, no focal motor weakness, follows commands appropriately  Psych:  Good insight, oriented to person, place and time, alert     Labs:    Recent Labs      07/12/17   1402   WBC  28.2*   HGB  10.0*   HCT  31.3*   PLT  185     Recent Labs      07/12/17   1402   NA  137   K  4.1   CL  100   CO2  26   GLU  115*   BUN  32*   CREA  5.60*   CA  9.4   ALB  2.4*   TBILI  1.6*   SGOT  24   ALT  11*     Lab Results   Component Value Date/Time    Glucose (POC) 84 07/29/2015 11:52 AM    Glucose (POC) 120 07/29/2015 07:40 AM    Glucose (POC) 105 07/29/2015 07:32 AM     No results for input(s): PH, PCO2, PO2, HCO3, FIO2 in the last 72 hours. No results for input(s): INR in the last 72 hours. No lab exists for component: INREXT  All Micro Results     Procedure Component Value Units Date/Time    CULTURE, BLOOD, PERIPHERAL [[id number]] Collected:  07/12/17 1402    Order Status:  Completed Specimen:  Blood Updated:  07/12/17 1415    CULTURE, BLOOD, PERIPHERAL [[id number]]     Order Status:  Sent Specimen:  Blood     CULTURE, STOOL [[id number]]     Order Status:  Sent Specimen:  Stool     C. DIFFICILE (DNA) [[id number]]     Order Status:  Sent           I have reviewed previous records       Assessment and Plan:      Sepsis / Shock / Leukocytosis - POA, unclear source, colitis vs DM foot ulcer. Sepsis protocol. This is severe sepsis. Trend lactate. Hydrate. Empiric Abx (Vanco, flagyl, zosyn). Follow blood and stool cx. Admit to tele. May need pressors. DM foot ulcer - POA, continue zosyn and vanco.  Podiatry consult. Diarrhea - POA, testing for C diff. IV Flagyl for now. FTT (failure to thrive) in adult / Lethargy / Debilitated patient / Vomiting - POA, worse than baseline due to sepsis. Fall precautions.   Will need PT/OT eval when better. Palliative consult. Would be appropriate for hospice when patient desires. Chronic diastolic heart failure - Admit to tele. Appears dry. Hydrate, and allow HD to handle final fluid volume.     ESRD (end stage renal disease) - Consult renal to handle HD. Renal diet when eating. Continue sevelamer when eating.     Anemia - POA presumed related to ESRD. She takes iron, resume when eating     Coronary atherosclerosis of native coronary artery / HTN (hypertension) - No acute issues. Monitor tele. Appreciate Cardiology consult. Hold IMDUR, metoprolol and norvasc due to shock     Chronic respiratory failure with hypoxia / Chronic airway obstruction, not elsewhere classified - Stable. No wheezing. Continue Advair, and prn duonebs. Oxygen as needed.     Hyperlipidemia - Continue zocor. No evidence of benefit in Pt with ESRD. I would stop it, and hold for now     Hypothyroidism - No longer listing synthroid. Check TSH.     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 SSI per protocol. Taking Januvia. Prior A1c was 5.7. No evidence she benefits from tight BG control.   I would stop it, and hold for now     DJD / Neuropathy - Prn tylenol.       Telemetry reviewed:   normal sinus rhythm    Risk of deterioration: high      Total time spent with patient: 79 Minutes Signed out to Dr Pato Cohen at [facility] discussed with: Patient, Family, Nursing Staff and >50% of time spent in counseling and coordination of care    Discussed:  Care Plan       ___________________________________________________    Attending Physician: Maureen Carney MD

## 2017-07-12 NOTE — PROGRESS NOTES
5:12 PM  TRANSFER - IN REPORT:    Verbal report received from Moustapha Schrader (name) on Malachi Kirk  being received from ED (unit) for routine progression of care      Report consisted of patients Situation, Background, Assessment and   Recommendations(SBAR). Information from the following report(s) SBAR, Kardex, ED Summary and Recent Results was reviewed with the receiving nurse. Opportunity for questions and clarification was provided. Assessment completed upon patients arrival to unit and care assumed. Primary Nurse David Guajardo RN and Matthew Wylie RN performed a dual skin assessment on this patient Impairment noted- see wound doc flow sheet  Shamir score is 18    5:52 PM  Spoke with MD about patient's BP. She advised that we wait and see how the patient is after the bolus is complete. Monitoring BP Q15 minutes.

## 2017-07-12 NOTE — ED NOTES
Patient dozing but easily aroused. Wound unchanged since initial assessment. VS as noted on flowsheet. Patient oriented x 3 with family at bedside.

## 2017-07-12 NOTE — PROGRESS NOTES
[facility]: MED RECONCILIATION    Comments/Recommendations:   Patient has not had Ferrous sulfate x ~4 days due to refill needed    Medication(s) ADDED to PTA list:  1. Bactrim DS 1 tab BID  2. Proair: 2 puff Q 4 hrs prn    Medication(s) REMOVED from PTA list:  1. none    Medication(s) ADJUSTED on PTA list:  1. Renvela dose adjusted to 1600 mg TID with meals and 800 mg with snacks  2. Ferrous sulfate changed to daily (from BID)  3. Simvastatin dose corrected to 20 mg (from 10 mg) every evening. Information obtained from: Family/ Rx Query    Significant PMH/Disease States:   Past Medical History:   Diagnosis Date    CAD (coronary artery disease)     Chronic obstructive pulmonary disease (HCC)     emphysema    Chronic respiratory failure with hypoxia (HCC)     Diastolic CHF, chronic (HCC)     DM type 2 causing renal disease (HCC)     [facility] (end stage [facility] disease) [facility]     GERD (gastroesophageal reflux disease)     Hypertension     Hypothyroidism        Chief Complaint for this Admission:   Chief Complaint   Patient presents with    Diarrhea    Altered mental status         Allergies: Review of patient's allergies indicates no known allergies. Prior to Admission Medications:     Medication Documentation Review Audit       Reviewed by DANIEL SantosD (Pharmacist) on 07/12/17 at 1815         Medication Sig Documenting Provider Last Dose Status Taking? albuterol (PROAIR HFA) 90 mcg/actuation inhaler Take 2 Puffs by inhalation every four (4) hours as needed for Wheezing. Historical Provider  Active Yes    amLODIPine (NORVASC) 10 mg tablet Take 10 mg by mouth daily. Historical Provider 7/12/2017 1200 Active Yes    ferrous sulfate 325 mg (65 mg iron) tablet Take 325 mg by mouth daily. Historical Provider 7/7/2017 Active Yes    fluticasone-salmeterol (ADVAIR DISKUS) 250-50 mcg/dose diskus inhaler Take 1 Puff by inhalation every twelve (12) hours.  Historical Provider 7/11/2017 1500 Active Yes isosorbide mononitrate ER (IMDUR) 30 mg tablet Take 30 mg by mouth daily. Debo Villatoro MD 7/12/2017 1200 Active Yes    metoprolol tartrate (LOPRESSOR) 25 mg tablet Take 12.5 mg by mouth two (2) times a day. Historical Provider 7/12/2017 1200 Active Yes    pantoprazole (PROTONIX) 40 mg tablet Take 40 mg by mouth two (2) times a day. Historical Provider 7/12/2017 1200 Active Yes    sevelamer (RENAGEL) 800 mg tablet Take 800 mg by mouth daily. With snacks Historical Provider  Active Yes    sevelamer carbonate (RENVELA) 800 mg tab tab Take 1,600 mg by mouth three (3) times daily. 1 tab bid with meals Debo Villatoro MD 7/12/2017 1200 Active Yes    simvastatin (ZOCOR) 20 mg tablet Take 20 mg by mouth nightly. Historical Provider 7/11/2017 pm Active Yes    SITagliptin (JANUVIA) 25 mg tablet Take 25 mg by mouth daily. Debo Villatoro MD 7/12/2017 1200 Active Yes    tiotropium (SPIRIVA) 18 mcg inhalation capsule Take 1 Cap by inhalation daily. Historical Provider 7/11/2017 1500 Active Yes    trimethoprim-sulfamethoxazole (BACTRIM DS) 160-800 mg per tablet Take 1 Tab by mouth two (2) times a day.  Historical Provider 7/12/2017 am Active Yes                        DAVE Mccollum   Contact: [phone]

## 2017-07-12 NOTE — OP NOTES
Operative Report     Procedure date: 7/12/17      Surgeon: Paulo Reyez DPM     Preop diagnosis: LT foot gas gangrene     Post op diagnosis: same     Procedure: Incision and drainage to level of bone / joint, LT foot with debridement of necrotic overlying skin / subcutaneous tissue     Pathology: anaerobic/aerobic wcx LT foot     Anesthesia: none needed 2/2 neuropathy     Hemostasis: anatomic dissection     Estimated Blood loss: minimal     Materials: none     Injectables: none     Complications: none     Description of procedure:  Pre-operative patient identification and surgical site confirmation was carried out by myself and confirmed by RN at [facility]. LT foot surgical site prepped with betadine.     An incision was made with a #15 blade over the LT 1st interspace corresponding to where the abscess was noted on imaging and corresponded to an area of necrotic boggy dusky tissue which exhibited subcutaneous crepitation. The incision was carried to the level of osseous tissue. 5 cc of purulent material was encountered and decompressed, the purulent material was noted to be clear orange / brown / frothy in color. Moderate anaerobic odor was noted. This material was sent for anaerobic and aerobic wound cultures. There was extensive necrotic tissue remaining in the base of the wound, including necrotic surrounding skin and tendon and joint tissues. Bone was present in the base of the wound, and was brown in appearance. There were multiple ulcerations to level of tendon over the LT 1st ray and interspace overlying the abscess. This tissue was noted to be dusky and necrotic and it was debrided with the #15 blade and scissors / pickup through the level of the deep fascia, < 20 cm2, to further decompress the abscess and allow free flow of air to the abscess site.     The incision was explored in all directions with blunt dissection and the lower leg/ankle/foot were strongly milked, and no further purulent material was noted, and no further subcutaneous crepitation was palpable. The surgical site was copiously irrigated with NSS. Minimal to no bleeding from surrounding tissues noted during and after the procedure. Satisfied with the decompression, the wound was packed open with betadine soaked gauze, and this was then covered with  4x4s, kerlix, and an ace bandage.      The patient tolerated the procedure well and will be monitored by her RN. This procedure is part of a series of staged procedures in an attempt at limb salvage.     She will go for formal LT 1st ray amputation tomorrow AM.

## 2017-07-12 NOTE — CONSULTS
Severe diarrhea/abd pain, distention  Sepsis/leukocytosis  Shock/hypotension/lactic acidosis  B pulm infiltrates. Large R effusion    - no further volume. - if further BP issues ==> pressor support. - agree with abs.  - stool for c. Diff.  - no HDF at present. If desats may need CRRT since BP marginal but currently well saturated and comfortable on nasal cannula. - BC  - abs are difficult since may have c diff colitis.   - watch belly exam.

## 2017-07-12 NOTE — PROGRESS NOTES
7/12/2017   CARE MANAGEMENT NOTE:  CM is following pt in the ER for initial discharge planning. EMR reviewed. CM met with pt and family at bedside to obtain hx for this needs assessment. Reportedly, pt resides with her son, Prince Butt ([phone]) and DIL in a tri level home with bedroom on the ground floor. There are no steps to the back entrance. PTA, pt was ambulatory with a rollator and she requires assistance with ADLs. Pt uses [phone]. She goes to hemodialysis at [facility] on T,TH,Sat. Pt has been to [facility] for rehab in the past and she does not have home healthcare currently. DME in the home includes a rollator, shower chair, and oxygen thru [address] Patient. PCP is Dr. Dian Hoskins. Plan is to return home when medically stable.   [place]

## 2017-07-12 NOTE — ED PROVIDER NOTES
HPI Comments: 71 y.o. female with past medical history significant for HTN, DM-2, ESRD, GERD, COPD, CAD, and diastolic CHF who presents accompanied by son and daughter-in-law with chief complaint of diarrhea. Per son, pt has experienced persistent diarrhea for the past 30 days, which has become progressively worse since onset. Pt's son states that she has 2-3 BM's per day and has had incontinence. He states \"she can't even sit at dialysis without having an accident. \" Additionally, pt has a wound to her L-great toe, which she has been on abx for for the past 3 weeks. Pt's son states that her diarrhea started before being on the abx. Pt's son also notes that the pt has been falling a lot recently. He noticed a bruise on her abdomen this morning that is tender to palpation. Pt also vomited x 1 this morning. Pt is chronically on O2 at home. Son denies any recent fever. There are no other acute medical concerns at this time. Dialysis:  Hemanth Davis is currently a hemodialysis patient. Is She a new dialysis patient (< 6 months)?  no  Current schedule is Tuesday/Thursday/Saturday   Last full session was 1 day ago. Current access location is L-arm. PCP: Susanne Mcleod MD  Nephrologist: Danny Ramos MD    Note written by Nathanael Perdue, as dictated by Alejandra Aguayo MD 1:50 PM      The history is provided by a relative. No  was used.         Past Medical History:   Diagnosis Date    CAD (coronary artery disease)     Chronic kidney disease     on dialysis -     Chronic obstructive pulmonary disease (HCC)     emphysema    Chronic respiratory failure with hypoxia (HCC)     Diabetes (HCC)     Diastolic CHF, chronic ([address]     DM type 2 causing renal disease ([address]     ESRD (end stage renal disease) ([address]     GERD (gastroesophageal reflux disease)     Hypertension     Thyroid disease     hyopthyroidism       Past Surgical History:   Procedure Laterality Date    HX AORTIC VALVE REPLACEMENT      HX CORONARY ARTERY BYPASS GRAFT      HX TUBAL LIGATION           Family History:   Problem Relation Age of Onset    Heart Disease Mother     Stroke Mother        Social History     Social History    Marital status:      Spouse name: N/A    Number of children: N/A    Years of education: N/A     Occupational History    Not on file. Social History Main Topics    Smoking status: Former Smoker     Packs/day: 0.50     Quit date: 4/29/2015    Smokeless tobacco: Never Used    Alcohol use No    Drug use: No    Sexual activity: Not on file     Other Topics Concern    Not on file     Social History Narrative         ALLERGIES: Review of patient's allergies indicates no known allergies. Review of Systems   Constitutional: Positive for fatigue. Negative for fever. Gastrointestinal: Positive for abdominal pain, diarrhea, nausea and vomiting. All other systems reviewed and are negative. Vitals:    07/12/17 1318   BP: (!) 89/39   Pulse: 61   Resp: 28   Temp: 98.3 °F (36.8 °C)   SpO2: 90%   Weight: 56.7 kg (125 lb)   Height: 5' 6\" (1.676 m)            Physical Exam   Constitutional: She is oriented to person, place, and time. She appears well-developed and well-nourished. No distress. She is small in stature and chronically ill appearing   HENT:   Head: Normocephalic and atraumatic. Eyes: Conjunctivae are normal. No scleral icterus. Neck: Neck supple. No tracheal deviation present. Cardiovascular: Normal rate, regular rhythm and intact distal pulses. Exam reveals no gallop and no friction rub. Murmur heard. Pulmonary/Chest: Effort normal and breath sounds normal. She has no wheezes. She has no rales. Abdominal: Soft. She exhibits no distension. There is no tenderness. There is no rebound and no guarding. Musculoskeletal: She exhibits no edema. Neurological: She is alert and oriented to person, place, and time. Skin: Skin is warm and dry. No rash noted. There is a dialysis shunt in the L-upper arm. There are 2 quarter sized, open ulcers on the medial aspect of the L-great toe w/ an unhealthy appearance   Psychiatric: She has a normal mood and affect. Nursing note and vitals reviewed. Note written by Nette Bazzi. Sushant Perdue, as dictated by Alejandra Aguayo MD 2:15 PM         MDM  Number of Diagnoses or Management Options    ED Course       Procedures    ED EKG interpretation:  Rhythm: normal sinus rhythm; and regular . Rate (approx.): 61; Axis: left axis deviation; ST/T wave: normal; Lateral T-wave inversion  Note written by Patti Perdue, as dictated by Alejandra Aguayo MD 1:44 PM    Consult note: Dr Leni wallace will have Dr. Bhumi Lopez see. Alejandra Aguayo MD  2:59 PM    Consult note: Dr. Bhumi Lopez will see. Recommends against 30 cc/kg NS based on CXR findings. Alejandra Aguayo MD  3:11 PM        A/P: sepsis - possible sources include diabetic foot or diarrhea; initial BP in high 80's; has gotten 500 cc NS; WBC = 28; lactate = 4+; have ordered Vanc and Zosyn; CT abd and left foot films pending. Admit for further management. Alejandra Aguayo MD  3:12 PM    Total critical care time spend exclusive of procedures:  40 min. Alejandra Aguayo MD  3:13 PM     Consult note: Dr Kathy Cagle - will admit.   Alejandra Aguayo MD  3:23 PM

## 2017-07-12 NOTE — ED NOTES
TRANSFER - OUT REPORT:    Verbal report given to Arnuflo Lu (name) on Luis Manuel Sesay  being transferred to [facility] room 333 (unit) for routine progression of care       Report consisted of patients Situation, Background, Assessment and   Recommendations(SBAR). Information from the following report(s) SBAR, ED Summary, Procedure Summary, Intake/Output, MAR and Recent Results was reviewed with the receiving nurse. Lines:   Peripheral IV 07/12/17 Right Forearm (Active)   Site Assessment Clean, dry, & intact 7/12/2017  2:01 PM   Phlebitis Assessment 0 7/12/2017  2:01 PM   Infiltration Assessment 0 7/12/2017  2:01 PM   Dressing Status Clean, dry, & intact; New 7/12/2017  2:01 PM   Dressing Type Transparent 7/12/2017  2:01 PM   Hub Color/Line Status Pink;Patent; Flushed 7/12/2017  2:01 PM   Alcohol Cap Used Yes 7/12/2017  2:01 PM       Peripheral IV (Active)   Dressing Type Transparent 7/12/2017  3:19 PM        Opportunity for questions and clarification was provided.       Patient transported with:   Monitor  Registered Nurse and Nurse Extern

## 2017-07-13 NOTE — PROGRESS NOTES
Lauryn Wiley  YOB: 1945          Assessment & Plan:   1. ESRD  · TTS  · HD today  · [facility] aware  · No heparin  2. Diarrhea  3. CAD/AS  4. DM  5.  S/P Ray ampulatation         Subjective:   CC:ESRD  HPI: Patient seen for HD need  Due for HD today  Just came back to ICU  She does not know where she goes for HD and who is her Nephrologist  ROS:no cp/sob/n/v  Current Facility-Administered Medications   Medication Dose Route Frequency    lidocaine (PF) (XYLOCAINE) 10 mg/mL (1 %) injection 0.1 mL  0.1 mL SubCUTAneous PRN    lactated Ringers infusion  125 mL/hr IntraVENous CONTINUOUS    lactated ringers bolus infusion 1,000 mL  1,000 mL IntraVENous ONCE    naloxone (NARCAN) injection 0.2 mg  0.2 mg IntraVENous EVERY 2 MINUTES AS NEEDED    flumazenil (ROMAZICON) 0.1 mg/mL injection 0.2 mg  0.2 mg IntraVENous Multiple    0.9% sodium chloride infusion  50 mL/hr IntraVENous CONTINUOUS    sodium chloride (NS) flush 5-10 mL  5-10 mL IntraVENous PRN    piperacillin-tazobactam (ZOSYN) 3.375 g in 0.9% sodium chloride (MBP/ADV) 100 mL  3.375 g IntraVENous Q12H    metroNIDAZOLE (FLAGYL) IVPB premix 500 mg  500 mg IntraVENous Q6H    albuterol-ipratropium (DUO-NEB) 2.5 MG-0.5 MG/3 ML  3 mL Nebulization Q6H PRN    glucose chewable tablet 16 g  4 Tab Oral PRN    dextrose (D50W) injection syrg 12.5-25 g  12.5-25 g IntraVENous PRN    glucagon (GLUCAGEN) injection 1 mg  1 mg IntraMUSCular PRN    0.9% sodium chloride infusion  75 mL/hr IntraVENous CONTINUOUS    acetaminophen (TYLENOL) tablet 650 mg  650 mg Oral Q4H PRN    diphenhydrAMINE (BENADRYL) capsule 25 mg  25 mg Oral Q4H PRN    ondansetron (ZOFRAN) injection 4 mg  4 mg IntraVENous Q4H PRN    insulin lispro (HUMALOG) injection   SubCUTAneous Q6H    fluticasone-vilanterol (BREO ELLIPTA) 100mcg-25mcg/puff  1 Puff Inhalation DAILY    sodium hypochlorite (HALF STRENGTH DAKIN'S) 0.25% irrigation (bottle)   Topical DAILY    PHENYLephrine (NEOSYNEPHRINE) 30,000 mcg in 0.9% sodium chloride 250 mL infusion   mcg/min IntraVENous TITRATE    0.9% sodium chloride infusion 250 mL  250 mL IntraVENous PRN    Vancomycin: pharmacy dosing   Other Rx Dosing/Monitoring          Objective:     Vitals:  Blood pressure (!) 86/34, pulse (!) 56, temperature 98.7 °F (37.1 °C), resp. rate 20, height 5' 6\" (1.676 m), weight 56.7 kg (125 lb), SpO2 100 %. Temp (24hrs), Av.1 °F (36.7 °C), Min:97.5 °F (36.4 °C), Max:98.7 °F (37.1 °C)      Intake and Output:      1901 -  0700  In: 1719.6 [I.V.:1358.3]  Out: -     Physical Exam:                Patient is intubated:  no    Physical Examination:   GENERAL ASSESSMENT: NAD  HEENT:Nontraumatic   CHEST: CTA  HEART: S1S2  ABDOMEN: Soft,NT,     EXTREMITY: EDEMA n  NEURO:Grossly non focal          ECG/rhythm:    Data Review      No results for input(s): TNIPOC in the last 72 hours. No lab exists for component: ITNL   Recent Labs      17   1402   TROIQ  <0.04     Recent Labs      17   0447  17   2330  17   1402   NA  141   --   137   K  3.7   --   4.1   CL  105   --   100   CO2  25   --   26   BUN  36*   --   32*   CREA  5.46*   --   5.60*   GLU  127*   --   115*   PHOS  4.4   --    --    MG  1.7   --    --    CA  8.4*   --   9.4   ALB  2.2*   --   2.4*   WBC  25.8*   --   28.2*   HGB  11.3*  9.7*  10.0*   HCT  35.6  29.9*  31.3*   PLT  217   --   185      No results for input(s): INR, PTP, APTT in the last 72 hours. No lab exists for component: INREXT  Needs: urine analysis, urine sodium, protein and creatinine  No results found for: Alicia Gasca      Discussed with:  Nursing    : Luca García MD  2017        [facility]:  [email or web address]  Karla Ghosh office:  [address] [place] [address],[address]  [address]  Phone: [phone]  Fax : [phone]    [place] office:  [address] [facility]  [place], [address]  Phone - [phone]  Fax - [phone]

## 2017-07-13 NOTE — OP NOTES
Operative Report     Procedure Date: July 13, 2017     Surgeon: Dr. Lacie Russo     Pre-Operative Diagnosis: left hallux and 1st interspace gas gangrene     Post-Operative Diagnosis: left hallux and 1st interspace gas gangrene     Procedure: left partial first ray amputation     Pathology: bone/soft tissue left foot 1st ray, anaerobic/aerobic bone cx     Anesthesia: MAC with Hadley block (10cc 1% lidocaine and 0.5% marcaine 50:50 mix)     Hemostasis: anatomic dissection     Estimated Blood loss: 10 cc     Materials: ¼ plain packing betadine soaked     Injectables: none     Complications: none     Description of procedure:  Pre-operative patient identification was carried out by myself, then the patient was brought to the operating room and placed on the operating table in a supine position. After adequate anesthesia was obtained the LLE was then prepped and draped in the normal sterile fashion.      Utilizing a #15 blade a raquet type incision was made extending from the midshaft of the left first metatarsal, encompassing the associated toe, and extending to the midshaft of the metatarsal. The associated toe was then disarticulated at the level of the metatarsophalangeal joint.       Further dissection to expose the metatarsal head and shaft was performed with a #15 blade, Hadley scissors, and a periosteal elevator. Next, a sagittal saw was utilized to remove the metatarsal at the proximal shaft. The cut was angled and attempted to minimize the disruption of the metatarsal parabola of the remnant metatarsals while also effectively managing the present infectious process. The stump was noted to have hard white bleeding bone, while the bone of the metatarsal head and proximal phalanx was noted to be brown and friable.       Next a #15 blade and Hadley scissors were used to remove the remaining necrotic soft tissue, plantar plates, and tendons.       The exposed bone was noted to be white and hard, and the soft tissues granular, and both bled well. During the procedure 2cc of purulence was encountered, along with a foul odor/dishwater type pus.     All bleeders were ligated/cauterized. Satisfied with the level of resection and that there was no more non-viable tissue, the wound was irrigated with copious amounts of normal saline. Due to the presence of subcutaneous emphysema, the wound was packed open with betadine soaked packing.     The patient tolerated the procedure and anesthesia well, and was transported from the operating room to the PACU with vital signs stable and with the neurovascular status of the operative foot intact.  This procedure is part of a series of staged procedures in an attempt at limb salvage.

## 2017-07-13 NOTE — ANESTHESIA PREPROCEDURE EVALUATION
Anesthetic History   No history of anesthetic complications            Review of Systems / Medical History  Patient summary reviewed, nursing notes reviewed and pertinent labs reviewed    Pulmonary    COPD: severe               Neuro/Psych   Within defined limits           Cardiovascular    Hypertension      CHF    CAD    Exercise tolerance: >4 METS     GI/Hepatic/Renal     GERD    Renal disease: ESRD and dialysis       Endo/Other    Diabetes: poorly controlled  Hypothyroidism       Other Findings   Comments: Gas gangrene in foot  Sepsis on pressor support  Acute colitis, possible C.  Diff         Physical Exam    Airway  Mallampati: II    Neck ROM: normal range of motion   Mouth opening: Normal     Cardiovascular  Regular rate and rhythm,  S1 and S2 normal,  no murmur, click, rub, or gallop  Rhythm: regular  Rate: normal         Dental  No notable dental hx       Pulmonary  Breath sounds clear to auscultation               Abdominal  GI exam deferred       Other Findings            Anesthetic Plan    ASA: 4  Anesthesia type: MAC - ankle block          Induction: Intravenous  Anesthetic plan and risks discussed with: Patient and Family

## 2017-07-13 NOTE — CONSULTS
[facility]     Name: Ally Hunt MRN: [id number]   : 1945 Hospital: [address]   Date: 2017        Impression Plan   1. Septic shock  2. Gas gangrene left toe  3. Abnormal CT chest- chronic scarring RLL with chronic right pleural effusion  4. Leukocytosis  5. Hx of COPD  6. Hx of ESRD               · Continue flagyl/Zosyn/vanc  · C-diff ordered and pending  · Titrate anant for MAP>65  · Breo  · Adequately fluid resuscitated  · CVPs  · Troponin negative  · Echo  · Hold AC for now  · Famotidine  · NPO       Pt is critically ill and at risk for hemodynamic decompensation. Critical care time spent with pt exclusive of procedures was 37 minutes    Radiology  ( personally reviewed) CXR: right pleural effusion and scarring- stable for atleast 2 years   ABG No results for input(s): PHI, PO2I, PCO2I in the last 72 hours. Subjective     This patient has been seen and evaluated at the request of Dr. Ramon Paris for sepsis. Patient is a 71 y.o. female with PMHx of CHF, DM, CKD who presente with complaints of diarrhea for weeks. Was found to be hypotensive with leukocytosis. Seen by podiatry who took pt to the OR this AM for a gangrenous left hallux. Pt on Anant 10 mcg before procedure, now on 60 mcg. Pt sleepy, but denies pain or SOB. Review of Systems:  Review of systems not obtained due to patient factors.     Past Medical History:   Diagnosis Date    CAD (coronary artery disease)     Chronic obstructive pulmonary disease (HCC)     emphysema    Chronic respiratory failure with hypoxia (HCC)     Diastolic CHF, chronic (HCC)     DM type 2 causing renal disease [address]     ESRD (end stage renal disease) (HCC)     GERD (gastroesophageal reflux disease)     Hypertension     Hypothyroidism       Past Surgical History:   Procedure Laterality Date    COLONOSCOPY N/A 2017    COLONOSCOPY performed by Laurie Soliman MD at [facility] HX AORTIC VALVE REPLACEMENT  HX CORONARY ARTERY BYPASS GRAFT      HX TUBAL LIGATION        Prior to Admission medications    Medication Sig Start Date End Date Taking? Authorizing Provider   SITagliptin (JANUVIA) 25 mg tablet Take 25 mg by mouth daily. Yes Debo Villatoro MD   sevelamer carbonate (RENVELA) 800 mg tab tab Take 1,600 mg by mouth three (3) times daily. 1 tab bid with meals   Yes Debo Villatoro MD   simvastatin (ZOCOR) 20 mg tablet Take 20 mg by mouth nightly. Yes Historical Provider   trimethoprim-sulfamethoxazole (BACTRIM DS) 160-800 mg per tablet Take 1 Tab by mouth two (2) times a day. 6/12/17  Yes Historical Provider   albuterol (PROAIR HFA) 90 mcg/actuation inhaler Take 2 Puffs by inhalation every four (4) hours as needed for Wheezing. Yes Historical Provider   sevelamer (RENAGEL) 800 mg tablet Take 800 mg by mouth daily. With snacks   Yes Historical Provider   isosorbide mononitrate ER (IMDUR) 30 mg tablet Take 30 mg by mouth daily. Yes Debo Villatoro MD   ferrous sulfate 325 mg (65 mg iron) tablet Take 325 mg by mouth daily. Yes Historical Provider   metoprolol tartrate (LOPRESSOR) 25 mg tablet Take 12.5 mg by mouth two (2) times a day. Yes Historical Provider   tiotropium (SPIRIVA) 18 mcg inhalation capsule Take 1 Cap by inhalation daily. Yes Historical Provider   fluticasone-salmeterol (ADVAIR DISKUS) 250-50 mcg/dose diskus inhaler Take 1 Puff by inhalation every twelve (12) hours. Yes Historical Provider   pantoprazole (PROTONIX) 40 mg tablet Take 40 mg by mouth two (2) times a day. Yes Historical Provider   amLODIPine (NORVASC) 10 mg tablet Take 10 mg by mouth daily.    Yes Historical Provider     Current Facility-Administered Medications   Medication Dose Route Frequency    piperacillin-tazobactam (ZOSYN) 3.375 g in 0.9% sodium chloride (MBP/ADV) 100 mL  3.375 g IntraVENous Q12H    metroNIDAZOLE (FLAGYL) IVPB premix 500 mg  500 mg IntraVENous Q6H    0.9% sodium chloride infusion  75 mL/hr IntraVENous CONTINUOUS    insulin lispro (HUMALOG) injection   SubCUTAneous Q6H    fluticasone-vilanterol (BREO ELLIPTA) 100mcg-25mcg/puff  1 Puff Inhalation DAILY    sodium hypochlorite (HALF STRENGTH DAKIN'S) 0.25% irrigation (bottle)   Topical DAILY    PHENYLephrine (NEOSYNEPHRINE) 30,000 mcg in 0.9% sodium chloride 250 mL infusion   mcg/min IntraVENous TITRATE    Vancomycin: pharmacy dosing   Other Rx Dosing/Monitoring     No Known Allergies   Social History   Substance Use Topics    Smoking status: Former Smoker     Packs/day: 0.50     Quit date: 4/29/2015    Smokeless tobacco: Never Used    Alcohol use No      Family History   Problem Relation Age of Onset    Heart Disease Mother     Stroke Mother           Laboratory: I have personally reviewed the critical care flowsheet and labs.      Recent Labs      07/13/17   0447 07/12/17   2330  07/12/17   1402   WBC  25.8*   --   28.2*   HGB  11.3*  9.7*  10.0*   HCT  35.6  29.9*  31.3*   PLT  217   --   185     Recent Labs      07/13/17   0447  07/12/17   1402   NA  141  137   K  3.7  4.1   CL  105  100   CO2  25  26   GLU  127*  115*   BUN  36*  32*   CREA  5.46*  5.60*   CA  8.4*  9.4   MG  1.7   --    PHOS  4.4   --    ALB  2.2*  2.4*   SGOT  18  24   ALT  13  11*       Objective:     Mode Rate Tidal Volume Pressure FiO2 PEEP                    Vital Signs:     TMAX(24)      Intake/Output:   Last shift:         Last 3 shifts: 07/13 0701 - 07/13 1900  In: 50 [I.V.:50]  Out: - RRIOLAST3  Intake/Output Summary (Last 24 hours) at 07/13/17 1226  Last data filed at 07/13/17 0921   Gross per 24 hour   Intake          1769.55 ml   Output                0 ml   Net          1769.55 ml     EXAM:   GENERAL: well developed and in no distress, HEENT:  PERRL, EOMI, no alar flaring or epistaxis, oral mucosa moist without cyanosis, NECK:  no jugular vein distention, no retractions, no thyromegaly or masses, LUNGS: CTA, no w/r/r, HEART:  Regular rate and rhythm with no MGR; no edema is present, ABDOMEN:  soft with no tenderness, bowel sounds present, EXTREMITIES:  Left toe dressing c/d/i.  No edema  , SKIN:  no jaundice or ecchymosis and NEUROLOGIC:  sleepy, grossly non-focal    Lacretia MD Tim  [facility] [place]

## 2017-07-13 NOTE — PROGRESS NOTES
[facility] Dosing Services: Antimicrobial Stewardship Progress Note    Consult for antibiotic dosing of Vancomycin by Dr. Yonny Wilkins  Pharmacist reviewed antibiotic appropriateness for 71year old , female  for indication of sepsis  Day of Therapy: 2  Note: patient with ESRD - current HD schedule is Tu/Th/Sa. Plan:  Vancomycin therapy:  Start Vancomycin therapy, with loading dose of 1,000 mg [facility]. Follow with maintenance dose of 500 mg IV after each HD session on Tu/Th/Sa. Dose calculated to approximate a therapeutic trough of 15-20 mcg/mL. Random level ordered with morning labs on 7/15/2017. Pharmacist will follow daily and will make changes to dose and/or frequency based on clinical status. Other Antimicrobial  (not dosed by pharmacist)   Metronidazole, Piperacillin/Tazobactam   Cultures      7/13/2017 Toe wound - in process   7/13/2017 Toe wound for anaerobes - in process   7/13/2017 MRSA screen - in process   7/12/2017 Wound - light gram negative rods; possible light 2nd strain gram negative rods; possible light staph aureus (pending)   7/12/2017 Toe wound for anaerobes - in process   7/12/2017 Blood - no growth x 18 hours (pending)   7/12/2017 Blood - no growth x 20 hours (pending)   Serum Creatinine     Lab Results   Component Value Date/Time    Creatinine 5.46 07/13/2017 04:47 AM    Creatinine (POC) 2.1 07/29/2015 07:40 AM       Creatinine Clearance Estimated Creatinine Clearance: 8.5 mL/min (based on Cr of 5.46).      Temp   98 °F (36.7 °C)    WBC   Lab Results   Component Value Date/Time    WBC 25.8 07/13/2017 04:47 AM       H/H   Lab Results   Component Value Date/Time    HGB 11.3 07/13/2017 04:47 AM        Platelets   Lab Results   Component Value Date/Time    PLATELET 598 49/94/6664 04:47 AM        Olesya Bull

## 2017-07-13 NOTE — DIABETES MGMT
DTC Consult Note     POC Glucose last 24hrs:     Lab Results   Component Value Date/Time     (H) 07/13/2017 04:47 AM     (H) 07/12/2017 02:02 PM    GLUCPOC 105 (H) 07/13/2017 09:26 AM    GLUCPOC 129 (H) 07/13/2017 05:37 AM    GLUCPOC 127 (H) 07/13/2017 12:13 AM        Recommendations/ Comments: Consult marked complete. Glucose is within target range. Per Dr. Marilyn Lee H&P dated 7/12/2017: I doubt this Dx.  SSI per protocol. Taking Januvia. Prior A1c was 5.7. No evidence she benefits from tight BG control. I would stop it, and hold for now    Per his assessment, pt is also a candidate for hospice care. Please reconsult if patient or family requests education.       Consult received from Whitney Vega MD for  [x]    Assessment of home management  []    Meal planning  []    Meter / Monitoring  []    New Diagnosis  []    Insulin education  []    Insulin pump notification  []    Medication recommendations  []    Hospital glucose management  []    Leona Turcios  []    Outpatient Education - Information forwarded to [facility] who will follow-up with pt 1 week after discharge     Hospital (inpatient) medications:  1. Correction Scale: Lispro (Humalog) Normal Sensitivity scale to cover for glucose > 139 mg/dL before meals and for glucose >199 at bedtime        Assessment / Plan:     Chart reviewed and initial evaluation complete on Gabriela Anjali     Lab Results   Component Value Date/Time    Hemoglobin A1c 5.7 07/12/2017 11:30 PM    Hemoglobin A1c 5.7 06/30/2015 11:55 AM     Estimated Creatinine Clearance: 8.5 mL/min (based on Cr of 5.46). Thank you.     Yodit Green, Certified Diabetes Educator    [phone]

## 2017-07-13 NOTE — OP NOTES
Alexis Amos [facility]   201 [facility], [address]   OP NOTE       Name:  Patrica Goldman   MR#:  [id number]   :  1945   Account #:  [de-identified]    Surgery Date:  2017   Date of Adm:  2017       PREOPERATIVE DIAGNOSIS: Hematochezia. POSTOPERATIVE DIAGNOSIS: Diverticulosis, without signs of   bleeding, and ischemic colitis noted of the sigmoid and descending   colon. PROCEDURES PERFORMED: Flexible sigmoidoscopy. MEDICATIONS: The same as given for the EGD (2 mg of Versed). ENDOSCOPIST: Claribel Faith MD    COMPLICATIONS: None. SPECIMENS REMOVED/BIOPSIES: None. ESTIMATED BLOOD LOSS: None. ANESTHESIA: none    DESCRIPTION OF PROCEDURE: After informed consent, the   Olympus GIF-190 was inserted into the rectum, and then advanced   towards the splenic flexure. In the sigmoid colon, diverticula were   noted, but no signs of active bleeding. In the proximal sigmoid colon   and distal descending colon, numerous ulcerated lesions with exudate   were identified, consistent with ischemic colitis. Because of her low   pressure that was present before the procedure even started, the   patient was halted at this time. It should be noted that there was no   complication of this procedure; however, after the procedure was over   she coughed and a tooth was seen by the nurse on her gown after the   procedure. IMPRESSION: Ischemic colitis and diverticulosis. PLAN: Supportive care. I discussed the case with Dusty Whiting MD.   Will hydrate and transfuse as needed to get blood pressure elevated.         MD Arsen Young   D:  2017   20:52   T:  2017   10:48   Job #:  [id number]

## 2017-07-13 NOTE — PERIOP NOTES
TRANSFER - OUT REPORT:    Verbal report given to Bob (name) on [facility]   Report consisted of patients Situation, Background, Assessment and   Recommendations(SBAR). Information from the following report(s) Procedure Summary and MAR was reviewed with the receiving nurse. Lines:   Peripheral IV 07/12/17 Right Forearm (Active)   Site Assessment Clean, dry, & intact 7/12/2017  2:01 PM   Phlebitis Assessment 0 7/12/2017  2:01 PM   Infiltration Assessment 0 7/12/2017  2:01 PM   Dressing Status Clean, dry, & intact; New 7/12/2017  2:01 PM   Dressing Type Transparent 7/12/2017  2:01 PM   Hub Color/Line Status Pink;Patent; Flushed 7/12/2017  2:01 PM   Alcohol Cap Used Yes 7/12/2017  2:01 PM       Peripheral IV (Active)   Dressing Type Transparent 7/12/2017  3:19 PM        Opportunity for questions and clarification was provided.

## 2017-07-13 NOTE — CONSULTS
Alexis Dandre francine Willard [address]   371 [facility], [address] [place] [address]   0 [facility]       Name:  Abril Pyle   MR#:  [id number]   :  1945   Account #:  [de-identified]    Date of Consultation:  2017   Date of Adm:  2017       REASON FOR CONSULTATION:  I was asked to see the patient by   Dr. Yamilet Walker for evaluation and help with treatment of end-stage renal   disease around the time of admission to the hospital for diarrhea and   possible sepsis. HISTORY OF PRESENT ILLNESS:  This is a 81-year-old [facility]   American female, on antibiotics several months ago, and   approximately 3 to 4 weeks later developed diarrhea. She says that   she has had diarrhea for a couple of months, although the family said it   might be slightly less time. Interestingly, she has been on Bactrim for the last 3 weeks or so. Over   the past week or so the diarrhea has worsened. She was dizzy at home   and came to the emergency room feeling weak. At that time she was   noted to be hypotensive and was also noted to have leukocytosis. Diagnosis of sepsis was made and she was placed on the sepsis   protocol with some adjustments because of the presence of end-stage   renal disease made at the time of my phone conversation with Dr. Juan Pablo Yepez. PAST MEDICAL HISTORY: INCLUDE:    1. End-stage renal disease on intermittent hemodialysis in center at the   [address] [place] [address] every Tuesday, Thursday and Saturday. She runs for 3-1/2 hours and runs on a standard potassium and   calcium bath. 2. Coronary artery disease. 3. Aortic stenosis post aortic valve replacement. 4. Coronary artery bypass grafting in the past.   5. History of tubal ligation. 6. Long-term type 2 diabetes mellitus. 7. Diastolic congestive heart failure with left ventricular hypertrophy. 8. GERD. 9. Hypothyroidism. SOCIAL HISTORY:  She does not smoke or drink alcohol to excess. She lives with family.  She stopped smoking in April of 2015. FAMILY HISTORY:  Positive for heart disease and stroke. MEDICATIONS AT THE TIME OF ADMISSION: Include:   1. Januvia. 2. Renvela 3 tablets with each meal.   3. Imdur 30 mg daily. 4. Metoprolol 12.5 mg b.i.d.   5. Spiriva. 6. Simvastatin 10 mg daily. 7. Advair Diskus 1 puff every 12 hours. 8. Protonix 40 mg b.i.d.   9. Amlodipine 10 mg daily. 10. Ferrous sulfate 325 mg daily. REVIEW OF SYSTEMS:  A general 12-point review of systems was   done in entirety and was basically negative other than the abdominal   distention, bloating and diarrhea. She specifically denies any problems   with chest pain, shortness of breath, increasing orthopnea, PND. She   has been a little more short of breath since coming in to the ER and   receiving fluid. She denies any cough, sputum production, hemoptysis   or wheezing. She denies any issues with nausea or vomiting but has   had voluminous diarrhea over the past week and perhaps longer. A 12-  point review of systems otherwise done in entirety was negative. PHYSICAL EXAMINATION   VITAL SIGNS:  Blood pressure is relatively low for her at 95/45, pulse   56 and regular, respirations 20 to 24. Temperature is afebrile on   admission. O2 saturation currently is 96% on nasal canula. BMI is   20.18 kg per m/sq. SKIN: Normal in appearance. NODES: Negative in the axillary and inguinal region. HEENT: Normocephalic, atraumatic cranium. Pupils round and   reactive to light. Extraocular movements were intact. Sclerae   anicteric. ENT was clear. Mucous membranes were moist.   NECK: Supple, without obvious jugular venous distention. Trachea   was midline. Thyroid was not enlarged. LUNGS: Clear anterior and laterally. HEART: Regular rate and rhythm without clicks, murmurs, or rubs   noted. ABDOMEN: Normoactive bowel sounds. Soft. It was distended. There   was no tenderness.  There was about a 5 to 6 cm in circumference, ecchymotic area over the anterior abdomen, which to the family was   unexplained, and it was fairly nontender. EXTREMITIES: No clubbing or cyanosis. No obvious edema. There   was a functioning left upper arm AV fistula with a good bruit and thrill. She had a left great toe ulcer. NEUROLOGIC:  Showed her to be oriented to person, place, time, and   situation. Cranial nerves 2 through 12, motor and sensory   examinations were intact. Cerebellar screening was not done. No   asterixis was noted on testing. Deep tendon reflexes were 2+ and   equal.   PSYCHIATRIC: Examination showed her to have good insight and   normal affect and disposition. LABORATORY DATA:  She had a white count of 28.2 thousand,   hemoglobin 10g%, platelet count 011,738 per mcL. Electrolytes were   normal. BUN 32, creatinine 5.6, calcium 9.4, albumin 2.4, total bilirubin   was 1.6. Chest x-ray today was done and is reviewed, shows some element of   pulmonary vascular congestion and a right-sided effusion or loss of   volume, I am not sure which. The right effusion appears to be   increased in size when compared to prior x-rays, and there are some   granulomas seen. IMPRESSION   1. Mild-to-moderate volume overload. 2. Moderate hypoxemia. 3. Hypotension. 4. Shock. 5. Sepsis. 6. Left great toe ulcer. 7. Severe diarrhea. 8. Leukocytosis. At this point I think we need to stabilize her hemodynamically. She is   currently oxygenating reasonably well and she is not going to receive   anymore volume at this point in time. Initiation of renal replacement   therapy for control of volume might be needed, but as long as she is   oxygenating reasonably well I think we can hold off until she is more   stable hemodynamically. Obviously, if her oxygenation decompensates   further we can put in a dialysis catheter and put her on CRRT as   needed. She may need pressor support for blood pressure   maintenance.  She has received antibiotics as part of the sepsis   protocol, but there is some concern amongst the ER physicians, as   well as Dr. Ben Jaramillo and me, that this might be an intra-abdominal   process relating to possibly an antibiotic-associated enterocolitis,   perhaps on the basis of C difficile. That testing will be done. RECOMMEND   1. No further IV fluids. 2. If blood pressure drops, can give albumin or pressor support. 3. Serial lab work. 4. Will monitor blood pressure closely. 5. Will do kidney replacement therapy, hopefully routinely tomorrow, if   blood pressure is better, but may require initiation of continuous renal   replacement therapy, depending on how things go.          MD DONG Velazquez / Geeta Lenz   D:  07/12/2017   16:11   T:  07/12/2017   19:58   Job #:  [id number]

## 2017-07-13 NOTE — ANESTHESIA POSTPROCEDURE EVALUATION
Post-Anesthesia Evaluation and Assessment    Patient: Betty Khanna MRN: [id number]  SSN: [id number]    YOB: 1945  Age: 71 y.o. Sex: female       Cardiovascular Function/Vital Signs  Visit Vitals    BP (!) 103/37    Pulse (!) 50    Temp 36.3 °C (97.4 °F)    Resp 23    Ht 5' 6\" (1.676 m)    Wt 56.7 kg (125 lb)    SpO2 98%    BMI 20.18 kg/m2       Patient is status post MAC anesthesia for Procedure(s):  AMPUTATION LEFT FIRST RAY. Nausea/Vomiting: None    Postoperative hydration reviewed and adequate. Pain:  Pain Scale 1: Visual (07/13/17 0946)  Pain Intensity 1: 0 (07/13/17 0946)   Managed    Neurological Status:   Neuro (WDL): Exceptions to WDL (07/13/17 0920)  Neuro  Neurologic State: Drowsy; Pharmacologically induced (comment) (07/13/17 0920)  Orientation Level: Disoriented to person;Oriented to situation (07/13/17 0710)  Cognition: Follows commands; Impaired decision making;Recognition of people/places; Other (comment) (Forgetful) (07/13/17 0710)  Speech: Clear (07/13/17 0710)  Size L Pupil (mm): 2 (07/13/17 0710)  Size R Pupil (mm): 2 (07/13/17 0710)  LUE Motor Response: Weak;Purposeful (07/13/17 0710)  LLE Motor Response: Weak;Purposeful (07/13/17 0710)  RUE Motor Response: Weak;Purposeful (07/13/17 0710)  RLE Motor Response: Weak;Purposeful (07/13/17 0710)   At baseline    Mental Status and Level of Consciousness: Arousable    Pulmonary Status:   O2 Device: Nasal cannula (07/13/17 0920)   Adequate oxygenation and airway patent    Complications related to anesthesia: None    Post-anesthesia assessment completed.  No concerns    Signed By: Torin Ness MD     July 13, 2017

## 2017-07-13 NOTE — CONSULTS
Palliative Medicine Consult  Bjorn: [phone] ([phone])    Patient Name: Aleksandra Benitez  YOB: 1945    Date of Initial Consult: 7/13/17  Reason for Consult: Care Decisions  Requesting Provider: Etelvina Aguilar MD  Primary Care Physician: Horace Richards MD      SUMMARY:   Aleksandra Benitez is a 71 y.o. female,  with a past history of cad, diastolic heart failure , dm , esrd on hemodialysis, daibetes admitted on 7/12/2017 from home with a diagnosis of fatigue , diarrhea , vomiting and left great toe infection on antibiotics for several months, more recently on bactrim, found to have septic shock, in asetting of left foot gangreen , underwent left partial first ray amputation. She lives with her son, Casey Louise, ambulatory with rollator, needs assistance with adls. Current medical issues leading to Palliative Medicine involvement include: left foot gangrene, hypotension, ischemic colitis, multiple chronic conditions       PALLIATIVE DIAGNOSES:   1. Hypotension  2. Leucocytosis  3. Diarrhea  4. Ischemic colitis( flexible sigmoidoscopy 7/12/17)  5. 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 6. Debility. 7. Care decisions. PLAN:   Visited patient who came backfrom ffoot surgery an hr ago , currently lucid oreintedx 2 , for nursing she is in and out confused,  She gave me permission to talk to his son/mpoa, regarding her medical condition. 1. I was unable to reach her son Mr Casey Louise over the phone, left a voice mail. 2. Introduce Palliative care service to patient . 3. Patient has a DDNR on chart, confirms to me she does not want CPR, partial code status is reflected in EMR for pressors only . 4. Advance Directive on the chart. Will continue to follow patient and family through the course of illness.    nitial consult note routed to primary continuity provider  Communicated plan of care with: Palliative IDT       GOALS OF CARE / TREATMENT PREFERENCES:   [====Goals of Care====]  GOALS OF CARE:  Patient / health care proxy stated goals: treatment of acute medical issues. TREATMENT PREFERENCES:   Code Status: Full Code    Advance Care Planning:  Advance Care Planning 7/1/2015   Patient's Healthcare Decision Maker is: Appointed agent (MPOA) in [facility] document - on file   Primary Decision Maker Name Rita Love \"Harrison\" Kendy Perez (son)   Primary Decision Maker Phone Number [phone]   Primary Decision Maker Relationship to Patient Adult child   Secondary Decision Maker Name Yisel Miranda    Secondary Decision Maker Phone Number n/a   Secondary Decision Maker Relationship to Patient Adult child   Confirm Advance Directive Yes, on file   Does the patient have other document types Do Not Resuscitate       Other:    The palliative care team has discussed with patient / health care proxy about goals of care / treatment preferences for patient.  [====Goals of Care====]         HISTORY:     History obtained from: patient and chart. CHIEF COMPLAINT: patient is admitted for weakness and diarrhea. HPI/SUBJECTIVE:    The patient is:   [] Verbal and participatory, poor historian . dirrhea x 2 months and foot infection x 2 months. Currently report diarrhea is better, denies any abdominal pain .     Clinical Pain Assessment (nonverbal scale for severity on nonverbal patients):   [++++ Clinical Pain Assessment++++]  [++++Pain Severity++++]: Pain: 0  [++++Pain Character++++]:   [++++Pain Duration++++]:   [++++Pain Effect++++]:   [++++Pain Factors++++]:   [++++Pain Frequency++++]:   [++++Pain Location++++]:   [++++ Clinical Pain Assessment++++]     FUNCTIONAL ASSESSMENT:     Palliative Performance Scale (PPS):  PPS: 40       PSYCHOSOCIAL/SPIRITUAL SCREENING:     Advance Care Planning:  Advance Care Planning 7/1/2015   Patient's Healthcare Decision Maker is: Appointed agent (MPOA) in [address] [facility] document - on file   Primary Decision Maker Name Rita Love \"Harrison\" Kendy Perez (son)   Primary Decision Maker Phone Number [phone]   Primary Decision Maker Relationship to Patient Adult child   Secondary Decision Maker Name Renita Julien    Secondary Decision Maker Phone Number n/a   Secondary Decision Maker Relationship to Patient Adult child   Confirm Advance Directive Yes, on file   Does the patient have other document types Do Not Resuscitate        Any spiritual / Hindu concerns:  [] Yes /  [] No    Caregiver Burnout:  [] Yes /  [] No /  [] No Caregiver Present      Anticipatory grief assessment:   [] Normal  / [] Maladaptive       ESAS Anxiety: Anxiety: 0    ESAS Depression:          REVIEW OF SYSTEMS:     Positive and pertinent negative findings in ROS are noted above in HPI. The following systems were [x] reviewed / [] unable to be reviewed as noted in HPI  Other findings are noted below. Systems: constitutional, ears/nose/mouth/throat, respiratory, gastrointestinal, genitourinary, musculoskeletal, integumentary, neurologic, psychiatric, endocrine. Positive findings noted below. Modified ESAS Completed by: provider   Fatigue: 2 Drowsiness: 2     Pain: 0   Anxiety: 0 Nausea: 0   Anorexia: 0 Dyspnea: 0           Stool Occurrence(s): 1        PHYSICAL EXAM:     From RN flowsheet:  Wt Readings from Last 3 Encounters:   07/12/17 125 lb (56.7 kg)   06/23/17 120 lb (54.4 kg)   07/29/15 121 lb 4.8 oz (55 kg)     Blood pressure 131/41, pulse (!) 59, temperature 98 °F (36.7 °C), resp. rate 15, height 5' 6\" (1.676 m), weight 125 lb (56.7 kg), SpO2 96 %. Pain Scale 1: Visual  Pain Intensity 1: 0     Pain Location 1: Abdomen  Pain Orientation 1: Anterior, Lower        Last bowel movement, if known:     Constitutional:alert, oriented x2,person and place , disoriented to dates.   Eyes: pupils equal, anicteric  ENMT: no nasal discharge, moist mucous membranes  Cardiovascular: regular rhythm, distal pulses intact  Respiratory: breathing not labored, symmetric  Gastrointestinal: soft non-tender, +bowel sounds  Musculoskeletal: no deformity, no tenderness to palpation  Skin: warm, dry  Neurologic: following commands, moving all extremities  Psychiatric: full affect, no hallucinations  Other: left foot dressing. HISTORY:     Active Problems:    HTN (hypertension) (12/27/2012)      Coronary atherosclerosis of native coronary artery (12/27/2012)      Other and unspecified hyperlipidemia (12/27/2012)      Anemia (6/29/2015)      Left kidney mass (6/29/2015)      ESRD (end stage renal disease) ([address] (6/62/4066)      Diastolic CHF, chronic (HCC) ()      Chronic respiratory failure with hypoxia (HCC) ()      DM type 2 causing renal disease ([address] ()      Sepsis ([address] (7/12/2017)      Shock ([address] (7/12/2017)      Diarrhea (7/12/2017)      Hypothyroidism ()      GERD (gastroesophageal reflux disease) ()      Chronic obstructive pulmonary disease (HCC) ()      Overview: emphysema      DM foot ulcer ([address] (7/12/2017)      Leukocytosis (7/12/2017)      Lethargy (7/12/2017)      Vomiting (7/12/2017)      FTT (failure to thrive) in adult (7/12/2017)      Debilitated patient (7/12/2017)      Past Medical History:   Diagnosis Date    CAD (coronary artery disease)     Chronic obstructive pulmonary disease (HCC)     emphysema    Chronic respiratory failure with hypoxia (HCC)     Diastolic CHF, chronic ([address]     DM type 2 causing renal disease (HCC)     ESRD (end stage renal disease) ([address]     GERD (gastroesophageal reflux disease)     Hypertension     Hypothyroidism       Past Surgical History:   Procedure Laterality Date    COLONOSCOPY N/A 7/12/2017    COLONOSCOPY performed by Park Kaur MD at [facility]    HX AORTIC VALVE REPLACEMENT      HX CORONARY ARTERY BYPASS GRAFT      HX TUBAL LIGATION        Family History   Problem Relation Age of Onset    Heart Disease Mother     Stroke Mother       History reviewed, no pertinent family history.   Social History   Substance Use Topics    Smoking status: Former Smoker     Packs/day: 0.50     Quit date: 4/29/2015    Smokeless tobacco: Never Used    Alcohol use No     No Known Allergies   Current Facility-Administered Medications   Medication Dose Route Frequency    sodium chloride (NS) flush 5-10 mL  5-10 mL IntraVENous PRN    piperacillin-tazobactam (ZOSYN) 3.375 g in 0.9% sodium chloride (MBP/ADV) 100 mL  3.375 g IntraVENous Q12H    metroNIDAZOLE (FLAGYL) IVPB premix 500 mg  500 mg IntraVENous Q6H    albuterol-ipratropium (DUO-NEB) 2.5 MG-0.5 MG/3 ML  3 mL Nebulization Q6H PRN    glucose chewable tablet 16 g  4 Tab Oral PRN    dextrose (D50W) injection syrg 12.5-25 g  12.5-25 g IntraVENous PRN    glucagon (GLUCAGEN) injection 1 mg  1 mg IntraMUSCular PRN    0.9% sodium chloride infusion  75 mL/hr IntraVENous CONTINUOUS    acetaminophen (TYLENOL) tablet 650 mg  650 mg Oral Q4H PRN    diphenhydrAMINE (BENADRYL) capsule 25 mg  25 mg Oral Q4H PRN    ondansetron (ZOFRAN) injection 4 mg  4 mg IntraVENous Q4H PRN    insulin lispro (HUMALOG) injection   SubCUTAneous Q6H    fluticasone-vilanterol (BREO ELLIPTA) 100mcg-25mcg/puff  1 Puff Inhalation DAILY    sodium hypochlorite (HALF STRENGTH DAKIN'S) 0.25% irrigation (bottle)   Topical DAILY    PHENYLephrine (NEOSYNEPHRINE) 30,000 mcg in 0.9% sodium chloride 250 mL infusion   mcg/min IntraVENous TITRATE    0.9% sodium chloride infusion 250 mL  250 mL IntraVENous PRN    Vancomycin: pharmacy dosing   Other Rx Dosing/Monitoring          LAB AND IMAGING FINDINGS:     Lab Results   Component Value Date/Time    WBC 25.8 07/13/2017 04:47 AM    HGB 11.3 07/13/2017 04:47 AM    PLATELET 102 16/22/9793 04:47 AM     Lab Results   Component Value Date/Time    Sodium 141 07/13/2017 04:47 AM    Potassium 3.7 07/13/2017 04:47 AM    Chloride 105 07/13/2017 04:47 AM    CO2 25 07/13/2017 04:47 AM    BUN 36 07/13/2017 04:47 AM    Creatinine 5.46 07/13/2017 04:47 AM    Calcium 8.4 07/13/2017 04:47 AM    Magnesium 1.7 07/13/2017 04:47 AM Phosphorus 4.4 07/13/2017 04:47 AM      Lab Results   Component Value Date/Time    AST (SGOT) 18 07/13/2017 04:47 AM    Alk. phosphatase 132 07/13/2017 04:47 AM    Protein, total 7.1 07/13/2017 04:47 AM    Albumin 2.2 07/13/2017 04:47 AM    Globulin 4.9 07/13/2017 04:47 AM     Lab Results   Component Value Date/Time    INR 1.4 06/23/2017 04:20 PM    Prothrombin time 14.5 06/23/2017 04:20 PM    aPTT 37.3 06/30/2015 11:55 AM      No results found for: IRON, FE, TIBC, IBCT, PSAT, FERR   No results found for: PH, PCO2, PO2  No components found for: GLPOC   No results found for: CPK, CKMB             Total time: 52 mins  Counseling / coordination time, spent as noted above: 35 mins  > 50% counseling / coordination?: yes. Prolonged service was provided for  []30 min   []75 min in face to face time in the presence of the patient, spent as noted above. Time Start:   Time End:   Note: this can only be billed with 79201 (initial) or 24098 (follow up). If multiple start / stop times, list each separately.

## 2017-07-13 NOTE — BRIEF OP NOTE
BRIEF OPERATIVE NOTE    Date of Procedure: 7/13/2017   Preoperative Diagnosis: Gas Gangrene Left Foot  Postoperative Diagnosis: Gas Gangrene Left Foot    Procedure(s):  AMPUTATION LEFT FIRST RAY  Surgeon(s) and Role:     * Mitul Nelson DPM - Primary       Surgical Staff:  Circ-1: Sid Hart RN  Scrub RN-1: Sil Garza RN  Float Staff: Wilman Forbes RN  Event Time In   Incision Start 0848   Incision Close 0906     Anesthesia: MAC   Estimated Blood Loss: 10cc  Specimens:   ID Type Source Tests Collected by Time Destination   1 : Left first toe Preservative Toe  Mitul Nelson DPM 7/13/2017 0851 Pathology   1 : Culture left toe Wound Toe CULTURE, ANAEROBIC, CULTURE, WOUND W Angelina Mosley DPM 7/13/2017 2901 Microbiology      Findings: Pt tolerated procedure and anesthesia well. Transported to PACU w/o complications. Vascular status intact left foot.    Complications: none  Implants: * No implants in log *

## 2017-07-13 NOTE — PERIOP NOTES
Mouth guard placed prior to upper endoscopy. Patient noted to have very poor dentition. Discussed with Dr. Korin Jurado.

## 2017-07-13 NOTE — ROUTINE PROCESS
Quinn Rosado TRANSFER - OUT REPORT:    Verbal report given to Jose Rice RN(name) on [facility]  being transferred to [facility](unit) for routine post - op       Report consisted of patients Situation, Background, Assessment and   Recommendations(SBAR). Information from the following report(s) SBAR and MAR was reviewed with the receiving nurse. Opportunity for questions and clarification was provided.       Patient transported with:   Monitor  O2 @ 4 liters  Registered Nurse

## 2017-07-13 NOTE — PROGRESS NOTES
Bedside shift change report given to Nestor Richmond ('R) RN (oncoming nurse) by Ann Marie Lagunas RN (offgoing nurse). Report included the following information SBAR, Kardex and ED Summary. 1900:  Report received, per report pt to get bedside GI scope within an hour, pt in bed, pt is hypotensive and bradycardic, pulmonary to be consulted    2000:  Pulmonary consult complete, Dr. Samara Mark ordered 1L of NS for BP and ordered may if BP did not resolve after pt received this 2L of fluid    2015:  Dr. Jade Yañez at bedside with endo team to perform scope, Endo RN completed consent, spoke with Dr. Jade Yañez and updated him on pulmonary MD orders and confirmed that he had ordered a second liter of IVF but it had not been competed, started 2nd liter bolus of NS.     2045:  Dr. Jade Yañez spoke with Dr. Travis Zhou about finding of scope, Dr. Travis Zhou ordered central line placement, CVP monitoring, Serial H&H, 1U PRBC, blood consent obtained and central line consent obtain from pt son. 2230:  Cental line in place, awaiting x-ray to confirm placement, pt son at bedside updated him on plan of care    2300:  Dr. Travis Zhou called to check in on pt, pt VSS on 30 of may, central line placement has not yet been verified, Dr. Travis Zhou said give him a call if there is a major drop in hemoglobin or pt CVP is less than 8.    0000:  Pt drowsy but easily arouseable, VSS on may at 30, CVP 19    0145:  1U of blood started    0230:  Decreased may to 20    0400:  No changes in assessment, pt resting comfortably, VSS , blood infusing    0445:  Blood transfusion complete, pt resting comfortably      0530:  Decreased may to 10    0645:  Pt transported to PACU via stretcher with my self and PACU staff    Bedside shift change report given to RN (oncoming nurse) by Nestor Richmond ('R) RN (offgoing nurse). Report included the following information SBAR, Kardex, ED Summary and Procedure Summary.

## 2017-07-13 NOTE — ADDENDUM NOTE
Addendum  created 07/13/17 1811 by Nilson Posadas MD    Anesthesia Event edited, Procedure Event Log accessed

## 2017-07-13 NOTE — PROGRESS NOTES
Venkata Resendez M.D.  [phone]           GI PROGRESS NOTE        NAME: Juli Brito   :  1945   MRN:  [id number]       Subjective:   Denies pain, tolerated solid diet well, no nausea or vomiting. Has been having frequent loose to watery stools, without bleeding. Objective:     VITALS:   Last 24hrs VS reviewed since prior progress note. Most recent are:  Visit Vitals    BP 95/44    Pulse 60    Temp 98 °F (36.7 °C)    Resp 27    Ht 5' 6\" (1.676 m)    Wt 56.7 kg (125 lb)    SpO2 92%    BMI 20.18 kg/m2       Intake/Output Summary (Last 24 hours) at 17 1712  Last data filed at 17 0357   Gross per 24 hour   Intake          1769.55 ml   Output                0 ml   Net          1769.55 ml       PHYSICAL EXAM:  General: Alert, in no acute distress    HEENT: Anicteric sclerae. Lungs:            CTA Bilaterally.    Heart:  Regular  rhythm,  maria ines  Abdomen: Soft, Non distended, Non tender.  (+)Bowel sounds      Lab Data Reviewed:   Recent Labs      17   0447  17   2330  17   1402   WBC  25.8*   --   28.2*   HGB  11.3*  9.7*  10.0*   HCT  35.6  29.9*  31.3*   PLT  217   --   185     Recent Labs      17   0447  17   1402   NA  141  137   K  3.7  4.1   CL  105  100   CO2  25  26   BUN  36*  32*   CREA  5.46*  5.60*   GLU  127*  115*   PHOS  4.4   --    CA  8.4*  9.4     Recent Labs      17   0447  17   1402   SGOT  18  24   AP  132*  147*   TP  7.1  7.4   ALB  2.2*  2.4*   GLOB  4.9*  5.0*   LPSE   --   33*       ________________________________________________________________________  Patient Active Problem List   Diagnosis Code    HTN (hypertension) I10    Coronary atherosclerosis of native coronary artery I25.10    Other and unspecified hyperlipidemia E78.5    Anemia D64.9    Left kidney mass N28.89    ESRD (end stage renal disease) ([facility]) F69.5    Diastolic CHF, chronic ([facility]) I50.32    Chronic respiratory failure with hypoxia (HCC) J96.11    DM type 2 causing renal disease (HCC) E11.29    Sepsis (HCC) A41.9    Shock ([address] R57.9    Diarrhea R19.7    Hypothyroidism E03.9    GERD (gastroesophageal reflux disease) K21.9    Chronic obstructive pulmonary disease (HCC) J44.9    DM foot ulcer (HCC) E11.621, L97.509    Leukocytosis D72.829    Lethargy R53.83    Vomiting R11.10    FTT (failure to thrive) in adult R62.7    Debilitated patient R53.81         Assessment and Plan:  Ischemic colitis with GI bleed, has ceased at this point. Continue with supportive care, IV antibiotics and fluids. Will change diet to low fat low residue and follow-up with you.        Signed By: Sophia Suresh MD     7/13/2017  5:12 PM

## 2017-07-13 NOTE — PROGRESS NOTES
Daily Progress Note: 7/13/2017  Lucinda Ramos MD    Assessment/Plan:   Severe Sepsis / Shock / Leukocytosis - POA, ischemic colitis and DM foot ulcer/gas gangrene. Sepsis protocol. Trend lactate. Hydrate. Empiric Abx (Vanco, flagyl, zosyn). Follow blood and stool cx. Currently on Anant     DM foot ulcer/Gas gangrene left toe/left hallux and 1st interspace gas gangrene - POA, continue antibiotics. Podiatry did left 1st Ray Amputation 7/13.     Diarrhea - POA, testing for C diff. IV Flagyl for now.      FTT (failure to thrive) in adult / Lethargy / Debilitated patient / Vomiting - POA, worse than baseline due to sepsis. Fall precautions. Will need PT/OT eval when better. Palliative consult. Would be appropriate for hospice when patient desires.     Chronic diastolic heart failure -   Appears dry. Hydrate, and allow HD to handle final fluid volume.      ESRD (end stage renal disease) - Consult renal to handle HD.  Renal diet when eating. Continue sevelamer when eating.      Anemia - POA presumed related to ESRD. She takes iron, resume when eating      Coronary atherosclerosis of native coronary artery / HTN (hypertension) - No acute issues. Monitor tele. Appreciate Cardiology consult. Resume IMDUR, metoprolol and norvasc when able     Chronic respiratory failure with hypoxia / Chronic airway obstruction, not elsewhere classified/Chronic scarring RLL with chronic right pleural effusion - Per Pulmonary. Continue Advair, and prn duonebs. Oxygen as needed.      Hyperlipidemia - Continue zocor. No evidence of benefit in Pt with ESRD. I would stop it, and hold for now      Hypothyroidism - No longer listing synthroid. Check TSH.     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  SSI per protocol. Taking Januvia. Prior A1c was 5.7. No evidence she benefits from tight BG control.   I would stop it, and hold for now      DJD / Neuropathy - Prn tylenol    Ischemic Colitis: Sigmoid done 7/13 by Dr Aftab Zambrano - [facility].        Problem List:  Problem List as of 7/13/2017  Date Reviewed: 6/29/2015          Codes Class Noted - Resolved    Sepsis ([facility] ICD-10-CM: A41.9  ICD-9-CM: 038.9, 995.91  7/12/2017 - Present        Shock ([facility] ICD-10-CM: R57.9  ICD-9-CM: 785.50  7/12/2017 - Present        Diarrhea ICD-10-CM: R19.7  ICD-9-CM: 787.91  7/12/2017 - Present        Hypothyroidism ICD-10-CM: E03.9  ICD-9-CM: 244.9  Unknown - Present        GERD (gastroesophageal reflux disease) ICD-10-CM: K21.9  ICD-9-CM: 530.81  Unknown - Present        Chronic obstructive pulmonary disease ([facility] ICD-10-CM: J44.9  ICD-9-CM: 912  Unknown - Present    Overview Signed 7/12/2017  3:31 PM by Corry Hernandez MD     emphysema             DM foot ulcer ([facility] ICD-10-CM: E11.621, L97.509  ICD-9-CM: 250.80, 707.15  7/12/2017 - Present        Leukocytosis ICD-10-CM: T53.562  ICD-9-CM: 288.60  7/12/2017 - Present        Lethargy ICD-10-CM: R53.83  ICD-9-CM: 780.79  7/12/2017 - Present        Vomiting ICD-10-CM: R11.10  ICD-9-CM: 787.03  7/12/2017 - Present        FTT (failure to thrive) in adult ICD-10-CM: R62.7  ICD-9-CM: 783.7  7/12/2017 - Present        Debilitated patient ICD-10-CM: R53.81  ICD-9-CM: 799.3  7/12/2017 - Present        Anemia ICD-10-CM: D64.9  ICD-9-CM: 285.9  6/29/2015 - Present        Left kidney mass ICD-10-CM: N28.89  ICD-9-CM: 593.9  6/29/2015 - Present        ESRD (end stage renal disease) ([facility] ICD-10-CM: N18.6  ICD-9-CM: 585.6  6/29/2015 - Present        Diastolic CHF, chronic (HCC) ICD-10-CM: I50.32  ICD-9-CM: 428.32, 428.0  Unknown - Present        Chronic respiratory failure with hypoxia (HCC) ICD-10-CM: J96.11  ICD-9-CM: 518.83, 799.02  Unknown - Present        DM type 2 causing renal disease (HCC) ICD-10-CM: E11.29  ICD-9-CM: 250.40  Unknown - Present        HTN (hypertension) ICD-10-CM: I10  ICD-9-CM: 401.9  12/27/2012 - Present        Coronary atherosclerosis of native coronary artery ICD-10-CM: I25.10  ICD-9-CM: 414.01  12/27/2012 - Present        Other and unspecified hyperlipidemia ICD-10-CM: E78.5  ICD-9-CM: 272.4  12/27/2012 - Present        RESOLVED: Hypertension ICD-10-CM: I10  ICD-9-CM: 401.9  Unknown - 7/12/2017        RESOLVED: ARF (acute renal failure) (HCC) ICD-10-CM: N17.9  ICD-9-CM: 584.9  6/29/2015 - 6/29/2015        RESOLVED: Ascites ICD-10-CM: R18.8  ICD-9-CM: 789.59  6/29/2015 - 7/12/2017        RESOLVED: Pleural effusion on right ICD-10-CM: J90  ICD-9-CM: 511.9  6/29/2015 - 7/12/2017        RESOLVED: Cystitis ICD-10-CM: N30.90  ICD-9-CM: 595.9  6/29/2015 - 7/12/2017        RESOLVED: Abdominal pain ICD-10-CM: R10.9  ICD-9-CM: 789.00  6/29/2015 - 7/12/2017        RESOLVED: CAD (coronary artery disease) ICD-10-CM: I25.10  ICD-9-CM: 414.00  Unknown - 7/12/2017        RESOLVED: Chronic diastolic heart failure ([facility] ICD-10-CM: I50.32  ICD-9-CM: 428.32  5/31/2013 - 7/12/2017        RESOLVED: Altered mental status ICD-10-CM: R41.82  ICD-9-CM: 780.97  1/3/2013 - 6/29/2015        RESOLVED: DM (diabetes mellitus) ([facility] ICD-10-CM: E11.9  ICD-9-CM: 250.00  12/27/2012 - 6/29/2015        RESOLVED: Chronic airway obstruction, not elsewhere classified ICD-10-CM: J44.9  ICD-9-CM: 473  12/27/2012 - 7/12/2017        RESOLVED: Tobacco abuse ICD-10-CM: Z72.0  ICD-9-CM: 305.1  12/27/2012 - 7/12/2017              Subjective:    71 y.o.  female who presented to the Emergency Department complaining of fatigue and diarrhea. She provides minimal hx. Per family, diarrhea for >4weeks. Lower abd pain for 2 days, with bilious vomiting last night  L great toe/Foot infection, not improving on Bactrim x3 weeks. Tolerating HD up to yesterday, but today in septic shock. Falling at home. We will admit her for management (dr Jorge Lira)    7/13: She reports she is feeling some better. Little pain at this time.   Underwent left 1st ray amputation today by podiatry for gas gangrene. Sigmoid by Dr Faiza Rodriguez on  revealed ischemic colitis. Requiring may for BP support. Review of Systems:   A comprehensive review of systems was negative except for that written in the HPI. Objective:   Physical Exam:     Visit Vitals    BP 95/44    Pulse 60    Temp 98 °F (36.7 °C)    Resp 27    Ht 5' 6\" (1.676 m)    Wt 56.7 kg (125 lb)    SpO2 92%    BMI 20.18 kg/m2    O2 Flow Rate (L/min): 3 l/min O2 Device: Nasal cannula    Temp (24hrs), Av °F (36.7 °C), Min:97.4 °F (36.3 °C), Max:98.7 °F (37.1 °C)    701 - 1900  In: 50 [I.V.:50]  Out: -    1901 -  0700  In: 1719.6 [I.V.:1358.3]  Out: -     General:  Alert, cooperative, no distress, appears stated age. Head:  Normocephalic, without obvious abnormality, atraumatic. Eyes:  Conjunctivae/corneas clear. EOMs intact. Throat: Lips, mucosa, and tongue moist..   Neck: Supple, symmetrical, trachea midline, no adenopathy, thyroid: no enlargement/tenderness/nodules, no carotid bruit and no JVD. Back:   Symmetric, no curvature. ROM normal. No CVA tenderness. Lungs:   Scattered rhonchi but otherwise clear to auscultation bilaterally. Heart:  Regular rate and rhythm, S1, S2 normal, no murmur, click, rub or gallop. Abdomen:   Soft, non-tender. Bowel sounds normal. No masses,  No organomegaly. Extremities: no cyanosis or edema. No calf tenderness or cords. There is a dialysis shunt in the L-upper arm. Dressing dry over left foot. Pulses: Trace in LEs. Skin:  turgor normal.    Neurologic: CNII-XII intact. Alert and oriented X 3. Fine motor of hands and fingers normal.   equal.  No cogwheeling or rigidity. Gait not tested at this time. Sensation grossly normal to touch.   Gross motor of extremities normal.       Data Review:       Recent Days:  Recent Labs      17   0447  17   2330  17   1402   WBC  25.8*   --   28.2*   HGB  11.3*  9.7*  10.0*   HCT  35.6  29.9*  31.3*   PLT  217 --   185     Recent Labs      07/13/17   0447  07/12/17   1402   NA  141  137   K  3.7  4.1   CL  105  100   CO2  25  26   GLU  127*  115*   BUN  36*  32*   CREA  5.46*  5.60*   CA  8.4*  9.4   MG  1.7   --    PHOS  4.4   --    ALB  2.2*  2.4*   TBILI  1.8*  1.6*   SGOT  18  24   ALT  13  11*     No results for input(s): PH, PCO2, PO2, HCO3, FIO2 in the last 72 hours.     24 Hour Results:  Recent Results (from the past 24 hour(s))   CULTURE, BLOOD, PERIPHERAL    Collection Time: 07/12/17  3:26 PM   Result Value Ref Range    Special Requests: NO SPECIAL REQUESTS      Culture result: NO GROWTH AFTER 18 HOURS     GLUCOSE, POC    Collection Time: 07/12/17  4:58 PM   Result Value Ref Range    Glucose (POC) 139 (H) 65 - 100 mg/dL    Performed by Félix Chacon (PCT)    CULTURE, WOUND W GRAM STAIN    Collection Time: 07/12/17  5:27 PM   Result Value Ref Range    Special Requests: NO SPECIAL REQUESTS      GRAM STAIN NO WBC'S SEEN      GRAM STAIN 2+ GRAM POSITIVE COCCI      GRAM STAIN OCCASIONAL GRAM NEGATIVE RODS      Culture result: LIGHT GRAM NEGATIVE RODS (A)      Culture result: LIGHT POSSIBLE 2ND GRAM NEGATIVE ADAM (A)      Culture result: LIGHT POSSIBLE STAPHYLOCOCCUS AUREUS (A)     LACTIC ACID    Collection Time: 07/12/17 11:30 PM   Result Value Ref Range    Lactic acid 1.7 0.4 - 2.0 MMOL/L   TSH 3RD GENERATION    Collection Time: 07/12/17 11:30 PM   Result Value Ref Range    TSH 5.51 (H) 0.36 - 3.74 uIU/mL   LIPID PANEL    Collection Time: 07/12/17 11:30 PM   Result Value Ref Range    LIPID PROFILE          Cholesterol, total 88 <200 MG/DL    Triglyceride 70 <150 MG/DL    HDL Cholesterol 60 MG/DL    LDL, calculated 14 0 - 100 MG/DL    VLDL, calculated 14 MG/DL    CHOL/HDL Ratio 1.5 0 - 5.0     HEMOGLOBIN A1C WITH EAG    Collection Time: 07/12/17 11:30 PM   Result Value Ref Range    Hemoglobin A1c 5.7 4.2 - 6.3 %    Est. average glucose 117 mg/dL   TYPE & CROSSMATCH    Collection Time: 07/12/17 11:30 PM   Result Value Ref Range    Crossmatch Expiration 07/15/2017     ABO/Rh(D) A NEGATIVE     Antibody screen NEG     Unit number [id number]     Blood component type RC LR AS1     Unit division 00     Status of unit ISSUED     Crossmatch result Compatible    HGB & HCT    Collection Time: 07/12/17 11:30 PM   Result Value Ref Range    HGB 9.7 (L) 11.5 - 16.0 g/dL    HCT 29.9 (L) 35.0 - 47.0 %   GLUCOSE, POC    Collection Time: 07/13/17 12:13 AM   Result Value Ref Range    Glucose (POC) 127 (H) 65 - 100 mg/dL    Performed by Parrish Garcia    LACTIC ACID    Collection Time: 07/13/17  4:47 AM   Result Value Ref Range    Lactic acid 1.8 0.4 - 2.0 MMOL/L   CBC W/O DIFF    Collection Time: 07/13/17  4:47 AM   Result Value Ref Range    WBC 25.8 (H) 3.6 - 11.0 K/uL    RBC 3.82 3.80 - 5.20 M/uL    HGB 11.3 (L) 11.5 - 16.0 g/dL    HCT 35.6 35.0 - 47.0 %    MCV 93.2 80.0 - 99.0 FL    MCH 29.6 26.0 - 34.0 PG    MCHC 31.7 30.0 - 36.5 g/dL    RDW 17.7 (H) 11.5 - 14.5 %    PLATELET 223 765 - 111 K/uL   MAGNESIUM    Collection Time: 07/13/17  4:47 AM   Result Value Ref Range    Magnesium 1.7 1.6 - 2.4 mg/dL   METABOLIC PANEL, COMPREHENSIVE    Collection Time: 07/13/17  4:47 AM   Result Value Ref Range    Sodium 141 136 - 145 mmol/L    Potassium 3.7 3.5 - 5.1 mmol/L    Chloride 105 97 - 108 mmol/L    CO2 25 21 - 32 mmol/L    Anion gap 11 5 - 15 mmol/L    Glucose 127 (H) 65 - 100 mg/dL    BUN 36 (H) 6 - 20 MG/DL    Creatinine 5.46 (H) 0.55 - 1.02 MG/DL    BUN/Creatinine ratio 7 (L) 12 - 20      GFR est AA 9 (L) >60 ml/min/1.73m2    GFR est non-AA 8 (L) >60 ml/min/1.73m2    Calcium 8.4 (L) 8.5 - 10.1 MG/DL    Bilirubin, total 1.8 (H) 0.2 - 1.0 MG/DL    ALT (SGPT) 13 12 - 78 U/L    AST (SGOT) 18 15 - 37 U/L    Alk.  phosphatase 132 (H) 45 - 117 U/L    Protein, total 7.1 6.4 - 8.2 g/dL    Albumin 2.2 (L) 3.5 - 5.0 g/dL    Globulin 4.9 (H) 2.0 - 4.0 g/dL    A-G Ratio 0.4 (L) 1.1 - 2.2     PHOSPHORUS    Collection Time: 07/13/17  4:47 AM   Result Value Ref Range    Phosphorus 4.4 2.6 - 4.7 MG/DL   GLUCOSE, POC    Collection Time: 07/13/17  5:37 AM   Result Value Ref Range    Glucose (POC) 129 (H) 65 - 100 mg/dL    Performed by Michelle Evon    CULTURE, WOUND Whitney Card STAIN    Collection Time: 07/13/17  8:57 AM   Result Value Ref Range    Special Requests: LEFT  TOE        GRAM STAIN OCCASIONAL WBCS SEEN      GRAM STAIN OCCASIONAL GRAM POSITIVE COCCI IN PAIRS      Culture result: PENDING    GLUCOSE, POC    Collection Time: 07/13/17  9:26 AM   Result Value Ref Range    Glucose (POC) 105 (H) 65 - 100 mg/dL    Performed by Isabella Guerrero    GLUCOSE, POC    Collection Time: 07/13/17 11:26 AM   Result Value Ref Range    Glucose (POC) 102 (H) 65 - 100 mg/dL    Performed by Landon Gaspar        Medications reviewed  Current Facility-Administered Medications   Medication Dose Route Frequency    famotidine (PF) (PEPCID) 20 mg in sodium chloride 0.9 % 10 mL injection  20 mg IntraVENous DIALYSIS TUE, THU & SAT    vancomycin (VANCOCIN) 500 mg in 0.9% sodium chloride (MBP/ADV) 100 mL  500 mg IntraVENous PRN    And    Vancomycin - Administer dose after each HD session   Other DIALYSIS TUE, THU & SAT    [START ON 7/15/2017] Vancomycin Random Level - draw with morning labs on 7/15/2017   Other PRN    sevelamer (RENAGEL) tablet 1,600 mg  1,600 mg Oral TID WITH MEALS    sevelamer (RENAGEL) tablet 800 mg  800 mg Oral BID PRN    sodium chloride (NS) flush 5-10 mL  5-10 mL IntraVENous PRN    piperacillin-tazobactam (ZOSYN) 3.375 g in 0.9% sodium chloride (MBP/ADV) 100 mL  3.375 g IntraVENous Q12H    metroNIDAZOLE (FLAGYL) IVPB premix 500 mg  500 mg IntraVENous Q6H    albuterol-ipratropium (DUO-NEB) 2.5 MG-0.5 MG/3 ML  3 mL Nebulization Q6H PRN    glucose chewable tablet 16 g  4 Tab Oral PRN    dextrose (D50W) injection syrg 12.5-25 g  12.5-25 g IntraVENous PRN    glucagon (GLUCAGEN) injection 1 mg  1 mg IntraMUSCular PRN    0.9% sodium chloride infusion  75 mL/hr IntraVENous CONTINUOUS    acetaminophen (TYLENOL) tablet 650 mg  650 mg Oral Q4H PRN    diphenhydrAMINE (BENADRYL) capsule 25 mg  25 mg Oral Q4H PRN    ondansetron (ZOFRAN) injection 4 mg  4 mg IntraVENous Q4H PRN    insulin lispro (HUMALOG) injection   SubCUTAneous Q6H    fluticasone-vilanterol (BREO ELLIPTA) 100mcg-25mcg/puff  1 Puff Inhalation DAILY    sodium hypochlorite (HALF STRENGTH DAKIN'S) 0.25% irrigation (bottle)   Topical DAILY    PHENYLephrine (NEOSYNEPHRINE) 30,000 mcg in 0.9% sodium chloride 250 mL infusion   mcg/min IntraVENous TITRATE    0.9% sodium chloride infusion 250 mL  250 mL IntraVENous PRN    Vancomycin: pharmacy dosing   Other Rx Dosing/Monitoring       Care Plan discussed with: Patient and Nurse  Total time spent with patient: 30 minutes.     Salina Fabry, MD

## 2017-07-13 NOTE — PROGRESS NOTES
1905 - Bedside and Verbal shift change report given to kvng marin (oncoming nurse) by Bettie England  (offgoing nurse). Report included the following information SBAR, Kardex, Intake/Output, MAR, Recent Results and Cardiac Rhythm NSR.   2125 - dialysis RN on floor to start patient on dialysis. 0700 - Bedside and Verbal shift change report given to eduard (oncoming nurse) by Rudy Tian (offgoing nurse). Report included the following information SBAR, Kardex, Intake/Output, MAR, Recent Results and Cardiac Rhythm NSR.

## 2017-07-13 NOTE — PROGRESS NOTES
[facility] Dosing Services: 7/13/17    Pharmacist Renal Dosing Progress Note for Famotidine   Physician Dr. Lola Henry    The following medication: Famotidine was automatically dose-adjusted per [facility] P&T Committee Protocol, with respect to renal function. Pt Weight:   Wt Readings from Last 1 Encounters:   07/12/17 56.7 kg (125 lb)     Dosage changed to:  Famotidine 20mg Tuesday, Thursday, Saturday after each dialysis session    Previous Regimen Famotidine 20mg every 12 hours   Serum Creatinine Lab Results   Component Value Date/Time    Creatinine 5.46 07/13/2017 04:47 AM    Creatinine (POC) 2.1 07/29/2015 07:40 AM       Creatinine Clearance Estimated Creatinine Clearance: 8.5 mL/min (based on Cr of 5.46). BUN Lab Results   Component Value Date/Time    BUN 36 07/13/2017 04:47 AM    BUN (POC) 30 07/29/2015 07:40 AM         Pharmacy to continue to monitor patient daily. Will make dosage adjustments based upon changing renal function.     Britton Moscoso, PharmD, BCPS  Contact information:  [phone]

## 2017-07-13 NOTE — PROGRESS NOTES
Central Line Procedure Note    Name:  Velma Cosme  Age:  71 y.o. MRN:  [id number]  CSN:  [id number]  :  1945    Referring physician: Vicky Jeffers MD     Indication: Need for vasopressors    Risks, benefits, alternatives explained and patient agrees to proceed. Patient positioned in Trendelenburg. 7-Step Sterility Protocol followed. (cap, mask sterile gown, sterile gloves, large sterile sheet, hand hygiene, 2% chlorhexidine for cutaneous antisepsis)       Right internal jugular cannulated x 1 attempt(s) using ultrasound guidance. Catheter secured & Biopatch applied. Sterile Tegaderm placed. CXR pending.       Silvino Thapa MD

## 2017-07-13 NOTE — PROGRESS NOTES
Nutrition Assessment:    RECOMMENDATIONS/INTERVENTION(S):   Mechanical Soft Cardiac diet  Add Nepro BID   Add 1 packet Banatrol mixed with 1/2 cup applesauce TID x 5 days  Monitor BG, renal labs and need for diet/supplement adjustment  Encourage meals  Will continue to monitor appetite/PO intake, diet tolerance, and acceptance of ONS    ASSESSMENT:   Received MST referral for weight loss. Chart reviewed. 70 yo female admitted with sepsis shock, FTT, diarrhea x 4 weeks, DM foot ulcer. Hx HTN, CHF, ESRD on HD, DM, respiratory failure, GERD, CAD, hypothyroidism. Pt s/p left partial first ray amputation today. Diet advanced to Cardiac, has yet to eat lunch. Poor dentition noted. Labs/Meds reviewed. BG stable. K+ and Phos okay, Mg low end of normal.  Receiving IVF, on Anant. LBM recorded 7/12--watery, C-diff results pending. Skin--right foot incision. No recent, comparable weights. Energy elevated secondary to HD and recent surgery--RD to add ONS. SUBJECTIVE/OBJECTIVE:     Diet Order: Cardiac  % Eaten:  No data found. Pertinent Medications: [] Reviewed    Chemistries:  Lab Results   Component Value Date/Time    Sodium 141 07/13/2017 04:47 AM    Potassium 3.7 07/13/2017 04:47 AM    Chloride 105 07/13/2017 04:47 AM    CO2 25 07/13/2017 04:47 AM    Anion gap 11 07/13/2017 04:47 AM    Glucose 127 07/13/2017 04:47 AM    BUN 36 07/13/2017 04:47 AM    Creatinine 5.46 07/13/2017 04:47 AM    BUN/Creatinine ratio 7 07/13/2017 04:47 AM    GFR est AA 9 07/13/2017 04:47 AM    GFR est non-AA 8 07/13/2017 04:47 AM    Calcium 8.4 07/13/2017 04:47 AM    AST (SGOT) 18 07/13/2017 04:47 AM    Alk.  phosphatase 132 07/13/2017 04:47 AM    Protein, total 7.1 07/13/2017 04:47 AM    Albumin 2.2 07/13/2017 04:47 AM    Globulin 4.9 07/13/2017 04:47 AM    A-G Ratio 0.4 07/13/2017 04:47 AM    ALT (SGPT) 13 07/13/2017 04:47 AM      Anthropometrics: Height: 5' 6\" (167.6 cm) Weight: 56.7 kg (125 lb)    IBW (%IBW): 59.1 kg (130 lb 4.7 oz) ( ) UBW (%UBW):   (  %)    BMI: Body mass index is 20.18 kg/(m^2). This BMI is indicative of:   [x] Underweight for advanced age    [] Normal    [] Overweight    []  Obesity    []  Extreme Obesity (BMI>40)  Estimated Nutrition Needs (Based on): 1429 Kcals/day (BMR (1099) x 1.3 AF ) , 68 g (-79 g/d (1.2-1.4 g/Kg actual body wt)) Protein  Carbohydrate: At Least 130 g/day  Fluids: 1450 mL/day    Last BM: 7/12, watery  [x]Active     []Hyperactive  []Hypoactive       [] Absent   BS  Skin:    [] Intact   [x] Incision-surgical rt foot  [] Breakdown   [] DTI   [] Tears/Excoriation/Abrasion  []Edema [] Other:    Wt Readings from Last 30 Encounters:   07/12/17 56.7 kg (125 lb)   06/23/17 54.4 kg (120 lb)   07/29/15 55 kg (121 lb 4.8 oz)   07/02/15 60.2 kg (132 lb 12.8 oz)   05/31/13 65.3 kg (144 lb)   05/14/13 70.3 kg (155 lb)   12/27/12 58.8 kg (129 lb 11.2 oz)      NUTRITION DIAGNOSES:   Problem:  Increased protein needs     Etiology: related to surgical wound, ESRD     Signs/Symptoms: as evidenced by s/p amputation for gangrene of the foot; on HD      NUTRITION INTERVENTIONS:  Meals/Snacks: General/healthful diet   Supplements: Commercial supplement              GOAL:   Pt will consume >50% meals/supplements, BG and electrolytes will be stable in next 3-5 days    Cultural, Catholic, or Ethnic Dietary Needs:   None    LEARNING NEEDS (Diet, Food/Nutrient-Drug Interaction):    [x] None Identified   [] Identified and Education Provided/Documented   [] Identified and Pt declined/was not appropriate      [] Interdisciplinary Care Plan Reviewed/Documented    [x] Discharge Needs: tbd     [x] No Nutrition Related Discharge Needs    NUTRITION RISK:   Pt Is At Nutrition Risk  [x]     No Nutrition Risk Identified  []       PT SEEN FOR:    []  MD Consult: []Calorie Count      []Diabetic Diet Education        []Diet Education     []Electrolyte Management     []General Nutrition Management and Supplements     []Management of Tube Feeding     []TPN Recommendations    [x]  RN Referral:  [x]MST score >=2     []Enteral/Parenteral Nutrition PTA     []Pregnant: Gestational DM or Multigestation                 [] Pressure Ulcer      []  Low BMI      []  Length of Stay       [] Dysphagia Diet         [] Ventilator      []  Follow-Up      Previous Recommendations:   [] Implemented          [] Not Implemented          [x] Not Applicable    Previous Goal:   [] Met              [] Progressing Towards Goal              [] Not Progressing Towards Goal   [x] Not Applicable              Patrizia Fountain, [address] [place] [address]   Pager [phone]

## 2017-07-13 NOTE — PERIOP NOTES
After the procedure patient coughed out a tooth which was found on the patient gown by the nurse. Dr Pina Combs notified.

## 2017-07-13 NOTE — PROGRESS NOTES
[facility] Dosing Services: Antimicrobial Stewardship Progress Note    Consult for antibiotic dosing of Vancomycin by Dr. Kathy Cagle  Pharmacist reviewed antibiotic appropriateness for 71year old , female  for indication of sepsis (ESRD)  Day of Therapy 1    Plan:  Vancomycin therapy:  Start Vancomycin therapy, with loading dose of 1000 (mg) at [facility] 7/12/17. Follow with maintenance dose of : to be determined pending whether she gets RRT or HD   Dose calculated to approximate a therapeutic trough of 15-20 mcg/mL. Last trough level / Plan for level:   Pharmacy to follow daily and will make changes to dose and/or frequency based on clinical status. Non-Kinetic Antimicrobial Dosing:   Current Regimen:    Recommendation:   Other Antimicrobial  (not dosed by pharmacist)   Zosyn, metronidazole   Cultures        Serum Creatinine     Lab Results   Component Value Date/Time    Creatinine 5.60 07/12/2017 02:02 PM    Creatinine (POC) 2.1 07/29/2015 07:40 AM       Creatinine Clearance Estimated Creatinine Clearance: 8.2 mL/min (based on Cr of 5.6).      Temp   98 °F (36.7 °C)    WBC   Lab Results   Component Value Date/Time    WBC 28.2 07/12/2017 02:02 PM       H/H   Lab Results   Component Value Date/Time    HGB 10.0 07/12/2017 02:02 PM        Platelets   Lab Results   Component Value Date/Time    PLATELET 424 46/30/0546 02:02 PM          Pharmacist: Signed Zaheer Win Contact information: [phone]

## 2017-07-13 NOTE — PROGRESS NOTES
Discussed with Dr Elizabeth Enriquez  Not a candidate for open surgery  If stable enough for angiographic evaluation and possible treatment, will do so tomorrow

## 2017-07-14 NOTE — PROGRESS NOTES
[facility]                         [phone]  Vitals Pre Post Assessment Pre Post   /47 128/42 LOC A&O x3 A&O x3   HR 59 67 Lungs CTA CTA   Temp 98.0 98.8 Cardiac SB RR   Resp 30 22 Skin W/D/I W/D/I   Weight 67.2kg 66.2kg Edema none none      Pain denies denies     Orders   Duration: Start: 2115 End: 0015 Total: 3   Dialyzer: revaclear   K Bath: 3   Ca Bath: 2.5   Na / Bicarb: 140/35   Target Fluid Removal: 1000mls     Access   Type & Location: Left UE AVF + B/T   Comments:    Prep per protocol, cannulated with 15gx2 , no excess bleeding                             Labs   Hep B status / date: >80, 3/9/17   Obtained/Reviewed  Critical Results Called reviewed  Dr. tip Perdomo Given   Name Dose Route                    Total Liters Process: 64   Net Fluid Removed: 1000mls      Comments   Time Out Done: [email or web address]   Primary Nurse Rpt Pre: YE Chacon RN   Primary Nurse Rpt Post: YE Chacon RN   Pt Education: yes   Care Plan: Noted unchanged   Tx Summary: Consents signed, tx initiated per protocol. [place] tx well. All poss blood returned with rinse back. Needles pulled sites held x3min DSG applied +B/T. Report given to staff RN. Pt left in bed with rails up x3, call bell in hand. Bed locked and in low position.

## 2017-07-14 NOTE — PROGRESS NOTES
Angio completed  Angioplasty of left Iliac artery performed  If wound doesn't heal, she would need a fem pop bypass for revascularization  Im not sure if she is a candidate for this. Will continue to follow with you.

## 2017-07-14 NOTE — PROGRESS NOTES
1045 Patient returned from OR and is now resting in bed. Resting in bed HR 60 at this time. Call bell is within reach. 1700 Rectal kennedy placed at this time. 1800 Stool sent to lab. 1930 Bedside shift change report given to Louis Redding (oncoming nurse) by Betzaida Wilkerson RN (offgoing nurse). Report included the following information SBAR, Kardex, OR Summary, Procedure Summary, Intake/Output and Accordion.

## 2017-07-14 NOTE — PROGRESS NOTES
Case Management continues to follow pt throughout her hospitalization. Pt lives with her son,Alondra and Alondra's wife. His cell number is [phone]. Pt has an outpatient HD chair @ [facility] on [facility] . Updated clinicals faxed to [facility]. At home,pt has home oxygen through [address] [facility]. She also has a rollator & shower chair @ home. In the past,pt was in SNF for rehab @ [address] [facility]. Pt was admitted with left foot gas gangrene , ischemic colitis,and severe sepsis. When stable for discharge,pt will likely need SNF placement. When stable medically,please prescribe PT & OT evaluations for pt to assist with recommendations for placement. Thank you.   Serg Cherry

## 2017-07-14 NOTE — CONSULTS
[facility]     Name: Landon Lu MRN: [id number]   : 1945 Hospital: [address]   Date: 2017        Impression Plan   1. Septic shock  2. Gas gangrene left toe  3. Abnormal CT chest- chronic scarring RLL with chronic right pleural effusion  4. Ischemic colitis per CT  5. GI bleed- hgb stable  6. PVD  7. Leukocytosis  8. Hx of COPD  9. Hx of ESRD               · Continue flagyl/Zosyn/vanc  · C-diff ordered, however no sample in lab yet  · Titrate may for MAP>60 or SBP >110  · Hgb stable  · Breo  · Adequately fluid resuscitated  · Troponin negative  · Angiographic eval today  · Hold AC for now- restart per surgical team  · Famotidine  · NPO       Pt is critically ill and at risk for hemodynamic decompensation. Critical care time spent with pt exclusive of procedures was 33 minutes    Radiology  ( personally reviewed) CXR: right pleural effusion and scarring- stable for atleast 2 years   ABG No results for input(s): PHI, PO2I, PCO2I in the last 72 hours. Subjective     Overnight events:  Remains on may 55 mcg, mainly due to low diastolics  Pt has mild pain in left foot. Denies SOB/CP  Still having large amounts of diarrhea      Past Medical History:   Diagnosis Date    CAD (coronary artery disease)     Chronic obstructive pulmonary disease (HCC)     emphysema    Chronic respiratory failure with hypoxia (HCC)     Diastolic CHF, chronic ([address]     DM type 2 causing renal disease ([address]     ESRD (end stage renal disease) ([address]     GERD (gastroesophageal reflux disease)     Hypertension     Hypothyroidism       Past Surgical History:   Procedure Laterality Date    COLONOSCOPY N/A 2017    COLONOSCOPY [facility] by [facility]iz [facility]    HX AORTIC VALVE REPLACEMENT      HX CORONARY ARTERY BYPASS GRAFT      HX TUBAL LIGATION        Prior to Admission medications    Medication Sig Start Date End Date Taking?  Authorizing Provider SITagliptin (JANUVIA) 25 mg tablet Take 25 mg by mouth daily. Yes Debo Villatoro MD   sevelamer carbonate (RENVELA) 800 mg tab tab Take 1,600 mg by mouth three (3) times daily. 1 tab bid with meals   Yes Debo Villatoro MD   simvastatin (ZOCOR) 20 mg tablet Take 20 mg by mouth nightly. Yes Historical Provider   trimethoprim-sulfamethoxazole (BACTRIM DS) 160-800 mg per tablet Take 1 Tab by mouth two (2) times a day. 6/12/17  Yes Historical Provider   albuterol (PROAIR HFA) 90 mcg/actuation inhaler Take 2 Puffs by inhalation every four (4) hours as needed for Wheezing. Yes Historical Provider   sevelamer (RENAGEL) 800 mg tablet Take 800 mg by mouth daily. With snacks   Yes Historical Provider   isosorbide mononitrate ER (IMDUR) 30 mg tablet Take 30 mg by mouth daily. Yes Debo Villatoro MD   ferrous sulfate 325 mg (65 mg iron) tablet Take 325 mg by mouth daily. Yes Historical Provider   metoprolol tartrate (LOPRESSOR) 25 mg tablet Take 12.5 mg by mouth two (2) times a day. Yes Historical Provider   tiotropium (SPIRIVA) 18 mcg inhalation capsule Take 1 Cap by inhalation daily. Yes Historical Provider   fluticasone-salmeterol (ADVAIR DISKUS) 250-50 mcg/dose diskus inhaler Take 1 Puff by inhalation every twelve (12) hours. Yes Historical Provider   pantoprazole (PROTONIX) 40 mg tablet Take 40 mg by mouth two (2) times a day. Yes Historical Provider   amLODIPine (NORVASC) 10 mg tablet Take 10 mg by mouth daily.    Yes Historical Provider     Current Facility-Administered Medications   Medication Dose Route Frequency    [START ON 7/15/2017] vancomycin level   Other ONCE    famotidine (PF) (PEPCID) 20 mg in sodium chloride 0.9 % 10 mL injection  20 mg IntraVENous DIALYSIS TUE, THU & SAT    Vancomycin - Administer dose after each HD session   Other DIALYSIS TUE, THU & SAT    sevelamer (RENAGEL) tablet 1,600 mg  1,600 mg Oral TID WITH MEALS    piperacillin-tazobactam (ZOSYN) 3.375 g in 0.9% sodium chloride (MBP/ADV) 100 mL  3.375 g IntraVENous Q12H    metroNIDAZOLE (FLAGYL) IVPB premix 500 mg  500 mg IntraVENous Q6H    0.9% sodium chloride infusion  75 mL/hr IntraVENous CONTINUOUS    insulin lispro (HUMALOG) injection   SubCUTAneous Q6H    fluticasone-vilanterol (BREO ELLIPTA) 100mcg-25mcg/puff  1 Puff Inhalation DAILY    sodium hypochlorite (HALF STRENGTH DAKIN'S) 0.25% irrigation (bottle)   Topical DAILY    PHENYLephrine (NEOSYNEPHRINE) 30,000 mcg in 0.9% sodium chloride 250 mL infusion   mcg/min IntraVENous TITRATE     No Known Allergies   Social History   Substance Use Topics    Smoking status: Former Smoker     Packs/day: 0.50     Quit date: 4/29/2015    Smokeless tobacco: Never Used    Alcohol use No      Family History   Problem Relation Age of Onset    Heart Disease Mother     Stroke Mother           Laboratory: I have personally reviewed the critical care flowsheet and labs.      Recent Labs      07/13/17   1753  07/13/17   0447  07/12/17   2330  07/12/17   1402   WBC   --   25.8*   --   28.2*   HGB  10.9*  11.3*  9.7*  10.0*   HCT  33.6*  35.6  29.9*  31.3*   PLT   --   217   --   185     Recent Labs      07/13/17   0447  07/12/17   1402   NA  141  137   K  3.7  4.1   CL  105  100   CO2  25  26   GLU  127*  115*   BUN  36*  32*   CREA  5.46*  5.60*   CA  8.4*  9.4   MG  1.7   --    PHOS  4.4   --    ALB  2.2*  2.4*   SGOT  18  24   ALT  13  11*       Objective:     Mode Rate Tidal Volume Pressure FiO2 PEEP                    Vital Signs:     TMAX(24)      Intake/Output:   Last shift:         Last 3 shifts:  [id number]    Intake/Output Summary (Last 24 hours) at 07/14/17 1043  Last data filed at 07/14/17 0600   Gross per 24 hour   Intake          3105.67 ml   Output              750 ml   Net          2355.67 ml     EXAM:   GENERAL: well developed and in no distress, awake, alert HEENT:  PERRL, EOMI, no alar flaring or epistaxis, oral mucosa moist without cyanosis, NECK:  no jugular vein distention, no retractions, no thyromegaly or masses, LUNGS: CTA, no w/r/r, HEART:  Regular rate and rhythm with no MGR; no edema is present, ABDOMEN:  soft with no tenderness, bowel sounds present, EXTREMITIES:  Left toe dressing c/d/i.  No edema  , SKIN:  no jaundice or ecchymosis and NEUROLOGIC:  sleepy, grossly non-focal    Honey Johnston MD  [facility]

## 2017-07-14 NOTE — PROGRESS NOTES
Podiatric Surgery - Progress Note    Assessment/Plan: gas gangrene LT medial foot, S/P LT guillotine 1st ray amputation, POD #1 (DOS: 7/13/17)  - Pt evaluated and tx.  - Site somewhat granular, poor bleeding but gangrene has not progressed as of yet, pending re-vascularization with Dr. Khai Birdges, possibly today. - DSG change with BW2D, can apply Dakins W2D when it arrives. - Abx per primary team  - Will monitor. Subjective:  Pt seen in ICU. Minimal pain, well controlled. Needs to go to bathroom, RN notified. HPI:  Pt had been following with vascular sx / Dr. Jamar Evans for [facility], who had noted she likely needed vascular intervention and potentially amputation of her LT 1st ray.      History:  Sepsis ([address]  Gas Gangrene Left Foot  GI bleed  No Known Allergies  Family History   Problem Relation Age of Onset    Heart Disease Mother     Stroke Mother       Past Medical History:   Diagnosis Date    CAD (coronary artery disease)     Chronic obstructive pulmonary disease ([address]     emphysema    Chronic respiratory failure with hypoxia (HCC)     Diastolic CHF, chronic ([address]     DM type 2 causing renal disease ([address]     ESRD (end stage renal disease) (HCC)     GERD (gastroesophageal reflux disease)     Hypertension     Hypothyroidism      Past Surgical History:   Procedure Laterality Date    COLONOSCOPY N/A 7/12/2017    COLON[facility] performed by Suellen Rossi MD [facility]    HX AORTIC VALVE REPLACEMENT      HX CORONARY ARTERY BYPASS GRAFT      HX TUBAL LIGATION       Social History   Substance Use Topics    Smoking status: Former Smoker     Packs/day: 0.50     Quit date: 4/29/2015    Smokeless tobacco: Never Used    Alcohol use No       History   Alcohol Use No     History   Drug Use No      History   Smoking Status    Former Smoker    Packs/day: 0.50    Quit date: 4/29/2015   Smokeless Tobacco    Never Used     Current Facility-Administered Medications   Medication Dose Route Frequency    [START ON 7/15/2017] vancomycin level   Other ONCE    famotidine (PF) (PEPCID) 20 mg in sodium chloride 0.9 % 10 mL injection  20 mg IntraVENous DIALYSIS TUE, THU & SAT    vancomycin (VANCOCIN) 500 mg in 0.9% sodium chloride (MBP/ADV) 100 mL  500 mg IntraVENous PRN    And    Vancomycin - Administer dose after each HD session   Other DIALYSIS TUE, THU & SAT    sevelamer (RENAGEL) tablet 1,600 mg  1,600 mg Oral TID WITH MEALS    sevelamer (RENAGEL) tablet 800 mg  800 mg Oral BID PRN    sodium chloride (NS) flush 5-10 mL  5-10 mL IntraVENous PRN    piperacillin-tazobactam (ZOSYN) 3.375 g in 0.9% sodium chloride (MBP/ADV) 100 mL  3.375 g IntraVENous Q12H    metroNIDAZOLE (FLAGYL) IVPB premix 500 mg  500 mg IntraVENous Q6H    albuterol-ipratropium (DUO-NEB) 2.5 MG-0.5 MG/3 ML  3 mL Nebulization Q6H PRN    glucose chewable tablet 16 g  4 Tab Oral PRN    dextrose (D50W) injection syrg 12.5-25 g  12.5-25 g IntraVENous PRN    glucagon (GLUCAGEN) injection 1 mg  1 mg IntraMUSCular PRN    0.9% sodium chloride infusion  75 mL/hr IntraVENous CONTINUOUS    acetaminophen (TYLENOL) tablet 650 mg  650 mg Oral Q4H PRN    diphenhydrAMINE (BENADRYL) capsule 25 mg  25 mg Oral Q4H PRN    ondansetron (ZOFRAN) injection 4 mg  4 mg IntraVENous Q4H PRN    insulin lispro (HUMALOG) injection   SubCUTAneous Q6H    fluticasone-vilanterol (BREO ELLIPTA) 100mcg-25mcg/puff  1 Puff Inhalation DAILY    sodium hypochlorite (HALF STRENGTH DAKIN'S) 0.25% irrigation (bottle)   Topical DAILY    PHENYLephrine (NEOSYNEPHRINE) 30,000 mcg in 0.9% sodium chloride 250 mL infusion   mcg/min IntraVENous TITRATE    0.9% sodium chloride infusion 250 mL  250 mL IntraVENous PRN        Objective:  Vitals:   Patient Vitals for the past 12 hrs:   BP Temp Temp src Pulse Resp SpO2 Weight   07/14/17 0800 132/43 98.5 °F (36.9 °C) - 64 27 99 % -   07/14/17 0730 124/50 - - 66 26 94 % -   07/14/17 0700 124/50 - - 66 19 94 % -   07/14/17 0600 129/43 - - 66 17 92 % -   07/14/17 0500 120/52 - - 62 22 96 % -   07/14/17 0400 119/53 98.7 °F (37.1 °C) - 63 23 94 % -   07/14/17 0300 124/51 - - 66 17 95 % 67.5 kg (148 lb 13 oz)   07/14/17 0200 121/42 - - 67 17 98 % -   07/14/17 0100 100/87 - - 71 23 (!) 89 % -   07/14/17 0015 (!) 131/38 98 °F (36.7 °C) Axillary 61 18 99 % -   07/14/17 0000 116/50 98.8 °F (37.1 °C) - 60 17 100 % -   07/13/17 2345 (!) 124/37 - - 64 17 100 % -   07/13/17 2330 (!) 122/36 - - 61 17 100 % -   07/13/17 2315 125/40 - - 65 18 100 % -   07/13/17 2301 - - - 61 - - -   07/13/17 2300 (!) 107/32 - - 61 21 100 % -   07/13/17 2245 128/41 - - (!) 57 17 100 % -   07/13/17 2230 126/41 - - 60 17 98 % -   07/13/17 2215 123/45 - - 60 25 96 % -   07/13/17 2201 106/44 - - 61 22 96 % -   07/13/17 2145 111/52 - - 60 26 95 % -   07/13/17 2130 110/48 - - 62 25 95 % -       Vascular:  B/L LE  DP 0/4; PT 0/4  capillary fill time brisk, pitting edema is present, skin temperature is cool, varicosities are present. Dermatological:  Nails are thickened, elongated, discolored, painful to palpation, 3 mm thick, with subungual debris. Skin is dry and scaly, exhibits hemosiderin deposition. Skin cracks present at heels b/l. There is no maceration of the interspaces of the feet b/l. Wound: 1  Location: over LT 1st ray amputation site  Margins: surgical  Drainage: minimal sanguinous  Odor: mild  Wound base: granular > fibrotic  Lymphangitic streaking? No.  Undermining? 3 mm all directions  Sinus tracts? No.  Exposed bone? No.  Subcutaneous crepitation on palpation? No.    Neurological:  DTR are present, protective sensation per 5.07 [place] Kenan monofilament is absent, patient is AAOx3, mood is normal. Epicritic sensation is intact. Orthopedic:  B/L LE are assymmetric, ROM of ankle, STJ, 1st MTPJ is limited, MMT 5 out of 5 for B/L LE.  LT 1st ray amputation.     Constitutional: Pt is a well developed thin elderly AAF. Lymphatics: negative tenderness to palpation of neck/axillary/inguinal nodes. Imaging / Cx / Px:  LT foot XR: IMPRESSION: Status post amputation of the first ray to the first metatarsal base  with packing material in place.     Labs:  Recent Labs      07/13/17   1753  07/13/17   0447   WBC   --   25.8*   CREA   --   5.46*   BUN   --   36*   GLU   --   127*   HGB  10.9*  11.3*   HCT  33.6*  35.6   NA   --   141   K   --   3.7   CL   --   105   CO2   --   25

## 2017-07-14 NOTE — CONSULTS
Palliative Medicine Consult  [place]: [phone] ([phone])    Patient Name: Steph Patel  YOB: 1945    Date of Initial Consult: 7/13/17  Reason for Consult: Care Decisions  Requesting Provider: Concepción Rivera MD  Primary Care Physician: Anju Lopez MD      SUMMARY:   Steph Patel is a 71 y.o. female,  with a past history of cad, diastolic heart failure , dm , esrd on hemodialysis, daibetes admitted on 7/12/2017 from home with a diagnosis of fatigue , diarrhea , vomiting and left great toe infection on antibiotics for several months, more recently on bactrim, found to have septic shock, in asetting of left foot gangreen , underwent left partial first ray amputation. She lives with her son, Earnestine Diaz, minimally ambulatory with rollator,needs assistance with adls at baseline has  [id number] [facility] with intermittent confusion. Current medical issues leading to Palliative Medicine involvement include: left foot gangrene, hypotension, ischemic colitis, multiple chronic conditions and dementia. PALLIATIVE DIAGNOSES:   1. Hypotension/ sepsis  2. Leucocytosis  3. Diarrhea  4. Ischemic colitis( flexible sigmoidoscopy 7/12/17)  5. 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 6. Debility. 7. Dementia(moderate )  7. Goals of care / discussion and counseling      PLAN:   Visited patient who is alert , oriented x2. I spoke to her son Chhaya Carballo, Mr Earnestine Diaz over the phone. 1. Introduce Palliative care services. 2. He son shared patient lives with him and her girlfriend, lately her dementia is progressing with more difficulty in ambulation, and more dependance with activities of daily living . 3. Mr Earnestine Diaz has fair understanding of current medical issues, he was here this morning.     4. We talked about dementia and what to expect as it is a progressive disease, educated on its course, behavioral symptoms, swallowing failure , increase in debility, risk of infections, pneumonia/aspiration . 5. Educated on burden of feeding tube for patient with advanced dementia. 6 his son shared he is beginning to see some signs of progression of dementia, increasing in confusion and more functional dependence, and is concerned that they may not be able to care for her at home , they have initiated medicaid application, for possible nursing home placement . 7. We talked about weighing to continue dialysis, if her dementia gets worse and discuss to focus on quality vs quantity of life, if she gets to point of furthur functional and memory impairement. 8. we discussed option of hospice if her dementia gets worse, to a point of bed bound and complete dependence. 9. Her son is very realistic, appreciated information. 10. Patient has a DDNR on chart, confirms to me she does not want CPR, partial code status is reflected in EMR for pressors only . 11. Advance Directive on the chart. we will continue to support patient and family through this difficult course of illness. Will continue to follow patient and family through the course of illness. nitial consult note routed to primary continuity provider  Communicated plan of care with: Palliative IDT and nurse Dusty Vazquez. GOALS OF CARE / TREATMENT PREFERENCES:   [====Goals of Care====]  GOALS OF CARE:  Patient / health care proxy stated goals: treatment of acute medical issues.       TREATMENT PREFERENCES:   Code Status: Partial Code    Advance Care Planning:  Advance Care Planning 7/1/2015   Patient's Healthcare Decision Maker is: Appointed agent ([place]) in ACP document - on file   Primary Decision Maker Name Rita [id number] \"Harrison\" Demaris Salvage (son)   Primary Decision Maker Phone Number [phone]   Primary Decision Maker Relationship to Patient Adult child   Secondary Decision Maker Name Jean Marie Point    Secondary Decision Maker Phone Number n/a   Secondary Decision Maker Relationship to Patient Adult child   Confirm Advance Directive Yes, on file   Does the patient have other document types Do Not Resuscitate       Other:    The palliative care team has discussed with patient / health care proxy about goals of care / treatment preferences for patient.  [====Goals of Care====]         HISTORY:     History obtained from: patient and chart. CHIEF COMPLAINT: patient is admitted for weakness and diarrhea. HPI/SUBJECTIVE:    The patient is:   [] Verbal and participatory, poor historian . dirrhea x 2 months and foot infection x 2 months. Currently report diarrhea is better, denies any abdominal pain . 7/14/17: patient reports pain in right foot, diarrhea is better. Clinical Pain Assessment (nonverbal scale for severity on nonverbal patients):   [++++ Clinical Pain Assessment++++]  [++++Pain Severity++++]: 4/10  [++++Pain Character++++]: unable to tell me because of dementia . [++++Pain Duration++++]: days.   [++++Pain Effect++++]:   [++++Pain Factors++++]:   [++++Pain Frequency++++]:   [++++Pain Location++++]: rt foot  [++++ Clinical Pain Assessment++++]     FUNCTIONAL ASSESSMENT:     Palliative Performance Scale (PPS):  PPS: 40       PSYCHOSOCIAL/SPIRITUAL SCREENING:     Advance Care Planning:  Advance Care Planning 7/1/2015   Patient's Healthcare Decision Maker is: Appointed agent ([employer]) in [address] [facility] document - on file   Primary Decision Maker Name Rita [id number] \"Harrison\" Demaris Salvage (son)   Primary Decision Maker Phone Number [phone]   Primary Decision Maker Relationship to Patient Adult child   Secondary Decision Maker Name Jean Marie Warren    Secondary Decision Maker Phone Number n/a   Secondary Decision Maker Relationship to Patient Adult child   Confirm Advance Directive Yes, on file   Does the patient have other document types Do Not Resuscitate        Any spiritual / Restoration concerns:  [] Yes /  [] No    Caregiver Burnout:  [] Yes /  [] No /  [] No Caregiver Present      Anticipatory grief assessment:   [] Normal  / [] Maladaptive ESAS Anxiety: Anxiety: 0    ESAS Depression:          REVIEW OF SYSTEMS:     Positive and pertinent negative findings in ROS are noted above in HPI. The following systems were [x] reviewed / [] unable to be reviewed as noted in HPI  Other findings are noted below. Systems: constitutional, ears/nose/mouth/throat, respiratory, gastrointestinal, genitourinary, musculoskeletal, integumentary, neurologic, psychiatric, endocrine. Positive findings noted below. Modified ESAS Completed by: provider   Fatigue: 2 Drowsiness: 2     Pain: 0   Anxiety: 0 Nausea: 0   Anorexia: 0 Dyspnea: 0           Stool Occurrence(s): 1        PHYSICAL EXAM:     From RN flowsheet:  Wt Readings from Last 3 Encounters:   07/14/17 148 lb 13 oz (67.5 kg)   06/23/17 120 lb (54.4 kg)   07/29/15 121 lb 4.8 oz (55 kg)     Blood pressure 132/43, pulse 64, temperature 97.6 °F (36.4 °C), resp. rate 27, height 5' 6\" (1.676 m), weight 148 lb 13 oz (67.5 kg), SpO2 99 %. Pain Scale 1: Numeric (0 - 10)  Pain Intensity 1: 0     Pain Location 1: Foot  Pain Orientation 1: Anterior, Left  Pain Description 1: Aching, Burning  Pain Intervention(s) 1: Medication (see MAR)  Last bowel movement, if known:     Constitutional:alert, oriented x2,person and place , disoriented to dates. Eyes: pupils equal, anicteric  ENMT: no nasal discharge, moist mucous membranes  Cardiovascular: regular rhythm, distal pulses intact  Respiratory: breathing not labored, symmetric  Gastrointestinal: soft non-tender, +bowel sounds  Musculoskeletal: no deformity, no tenderness to palpation  Skin: warm, dry  Neurologic: following commands, moving all extremities  Psychiatric: full affect, no hallucinations  Other: left foot dressing.        HISTORY:     Active Problems:    HTN (hypertension) (12/27/2012)      Coronary atherosclerosis of native coronary artery (12/27/2012)      Other and unspecified hyperlipidemia (12/27/2012)      Anemia (6/29/2015)      Left kidney mass (6/29/2015)      ESRD (end stage renal disease) ([address] (8/33/2328)      Diastolic CHF, chronic (HCC) ()      Chronic respiratory failure with hypoxia (HCC) ()      DM type 2 causing renal disease ([address] ()      Sepsis ([address] (7/12/2017)      Shock ([address] (7/12/2017)      Diarrhea (7/12/2017)      Hypothyroidism ()      GERD (gastroesophageal reflux disease) ()      Chronic obstructive pulmonary disease (HCC) ()      Overview: emphysema      DM foot ulcer ([address] (7/12/2017)      Leukocytosis (7/12/2017)      Lethargy (7/12/2017)      Vomiting (7/12/2017)      FTT (failure to thrive) in adult (7/12/2017)      Debilitated patient (7/12/2017)      Past Medical History:   Diagnosis Date    CAD (coronary artery disease)     Chronic obstructive pulmonary disease (HCC)     emphysema    Chronic respiratory failure with hypoxia (HCC)     Diastolic CHF, chronic ([address]     DM type 2 causing renal disease (HCC)     ESRD (end stage renal disease) ([address]     GERD (gastroesophageal reflux disease)     Hypertension     Hypothyroidism       Past Surgical History:   Procedure Laterality Date    COLONOSCOPY N/A 7/12/2017    [facility]    HX AORTIC VALVE REPLACEMENT      HX CORONARY ARTERY BYPASS GRAFT      HX TUBAL LIGATION        Family History   Problem Relation Age of Onset    Heart Disease Mother     Stroke Mother       History reviewed, no pertinent family history.   Social History   Substance Use Topics    Smoking status: Former Smoker     Packs/day: 0.50     Quit date: 4/29/2015    Smokeless tobacco: Never Used    Alcohol use No     No Known Allergies   Current Facility-Administered Medications   Medication Dose Route Frequency    [START ON 7/15/2017] vancomycin level   Other ONCE    famotidine (PF) (PEPCID) 20 mg in sodium chloride 0.9 % 10 mL injection  20 mg IntraVENous DIALYSIS TUE, THU & SAT    vancomycin (VANCOCIN) 500 mg in 0.9% sodium chloride (MBP/ADV) 100 mL  500 mg IntraVENous PRN    And    Vancomycin - Administer dose after each HD session   Other DIALYSIS TUE, THU & SAT    sevelamer (RENAGEL) tablet 1,600 mg  1,600 mg Oral TID WITH MEALS    sevelamer (RENAGEL) tablet 800 mg  800 mg Oral BID PRN    sodium chloride (NS) flush 5-10 mL  5-10 mL IntraVENous PRN    piperacillin-tazobactam (ZOSYN) 3.375 g in 0.9% sodium chloride (MBP/ADV) 100 mL  3.375 g IntraVENous Q12H    metroNIDAZOLE (FLAGYL) IVPB premix 500 mg  500 mg IntraVENous Q6H    albuterol-ipratropium (DUO-NEB) 2.5 MG-0.5 MG/3 ML  3 mL Nebulization Q6H PRN    glucose chewable tablet 16 g  4 Tab Oral PRN    dextrose (D50W) injection syrg 12.5-25 g  12.5-25 g IntraVENous PRN    glucagon (GLUCAGEN) injection 1 mg  1 mg IntraMUSCular PRN    0.9% sodium chloride infusion  75 mL/hr IntraVENous CONTINUOUS    acetaminophen (TYLENOL) tablet 650 mg  650 mg Oral Q4H PRN    diphenhydrAMINE (BENADRYL) capsule 25 mg  25 mg Oral Q4H PRN    ondansetron (ZOFRAN) injection 4 mg  4 mg IntraVENous Q4H PRN    insulin lispro (HUMALOG) injection   SubCUTAneous Q6H    fluticasone-vilanterol (BREO ELLIPTA) 100mcg-25mcg/puff  1 Puff Inhalation DAILY    sodium hypochlorite (HALF STRENGTH DAKIN'S) 0.25% irrigation (bottle)   Topical DAILY    PHENYLephrine (NEOSYNEPHRINE) 30,000 mcg in 0.9% sodium chloride 250 mL infusion   mcg/min IntraVENous TITRATE    0.9% sodium chloride infusion 250 mL  250 mL IntraVENous PRN          LAB AND IMAGING FINDINGS:     Lab Results   Component Value Date/Time    WBC 20.4 07/14/2017 11:05 AM    HGB 10.0 07/14/2017 11:05 AM    PLATELET 384 92/94/9502 11:05 AM     Lab Results   Component Value Date/Time    Sodium 142 07/14/2017 11:05 AM    Potassium 3.7 07/14/2017 11:05 AM    Chloride 107 07/14/2017 11:05 AM    CO2 23 07/14/2017 11:05 AM    BUN 33 07/14/2017 11:05 AM    Creatinine 4.79 07/14/2017 11:05 AM    Calcium 8.8 07/14/2017 11:05 AM    Magnesium 1.8 07/14/2017 11:05 AM Phosphorus 4.4 07/13/2017 04:47 AM      Lab Results   Component Value Date/Time    AST (SGOT) 14 07/14/2017 11:05 AM    Alk. phosphatase 125 07/14/2017 11:05 AM    Protein, total 7.2 07/14/2017 11:05 AM    Albumin 2.2 07/14/2017 11:05 AM    Globulin 5.0 07/14/2017 11:05 AM     Lab Results   Component Value Date/Time    INR 1.4 06/23/2017 04:20 PM    Prothrombin time 14.5 06/23/2017 04:20 PM    aPTT 37.3 06/30/2015 11:55 AM      No results found for: IRON, FE, TIBC, IBCT, PSAT, FERR   No results found for: PH, PCO2, PO2  No components found for: GLPOC   No results found for: CPK, CKMB             Total time: 35 mins  Counseling / coordination time, spent as noted above: 20 mins  > 50% counseling / coordination?: yes. Prolonged service was provided for  []30 min   []75 min in face to face time in the presence of the patient, spent as noted above. Time Start:   Time End:   Note: this can only be billed with 11922 (initial) or 65194 (follow up). If multiple start / stop times, list each separately.

## 2017-07-14 NOTE — PROGRESS NOTES
Daily Progress Note: 7/14/2017  Jonny Hamilton MD    Assessment/Plan:   Severe Sepsis / Shock / Leukocytosis - POA, ischemic colitis and DM foot ulcer/gas gangrene. Sepsis protocol. Trend lactate. Hydrate. Empiric Abx (Vanco, flagyl, zosyn). Follow blood and stool cx. Currently on Anant     DM foot ulcer/Gas gangrene left toe/left hallux and 1st interspace gas gangrene - POA, continue antibiotics. Podiatry did left 1st Ray Amputation 7/13.     Diarrhea - likely from Ischemic Colitis - diarrhea now resolved.      FTT (failure to thrive) in adult / Lethargy / Debilitated patient / Vomiting - POA, worse than baseline due to sepsis. Fall precautions. Will need PT/OT eval when better. Palliative consulted     Chronic diastolic heart failure -   Hydrate as needed, and allow HD to handle final fluid volume.      ESRD (end stage renal disease) - Consult renal to handle HD.  Renal diet when eating. Continue sevelamer when eating.      Anemia - POA presumed related to ESRD. She takes iron, resume when eating      Coronary atherosclerosis of native coronary artery / HTN (hypertension) - No acute issues. Monitor tele. Appreciate Cardiology consult. Resume IMDUR, metoprolol and norvasc when able     Chronic respiratory failure with hypoxia / Chronic airway obstruction, not elsewhere classified/Chronic scarring RLL with chronic right pleural effusion - Per Pulmonary. Continue Advair, and prn duonebs. Oxygen as needed.      Hyperlipidemia - Continue zocor. No evidence of benefit in Pt with ESRD. I would stop it, and hold for now      Hypothyroidism - No longer listing synthroid. Check TSH.     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 Prior A1c was 5.7.       DJD / Neuropathy - Prn tylenol    Ischemic Colitis: Sigmoid done 7/13 by Dr Minerva Leal - per GI.        Problem List:  Problem List as of 7/14/2017  Date Reviewed: 6/29/2015          Codes Class Noted - Resolved    Sepsis ([facility] ICD-10-CM: A41.9  ICD-9-CM: 038.9, 995.91  7/12/2017 - Present        Shock ([facility] ICD-10-CM: R57.9  ICD-9-CM: 785.50  7/12/2017 - Present        Diarrhea ICD-10-CM: R19.7  ICD-9-CM: 787.91  7/12/2017 - Present        Hypothyroidism ICD-10-CM: E03.9  ICD-9-CM: 244.9  Unknown - Present        GERD (gastroesophageal reflux disease) ICD-10-CM: K21.9  ICD-9-CM: 530.81  Unknown - Present        Chronic obstructive pulmonary disease ([facility] ICD-10-CM: J44.9  ICD-9-CM: 144  Unknown - Present    Overview Signed 7/12/2017  3:31 PM by Marely Gao MD     emphysema             DM foot ulcer ([facility] ICD-10-CM: E11.621, L97.509  ICD-9-CM: 250.80, 707.15  7/12/2017 - Present        Leukocytosis ICD-10-CM: M77.607  ICD-9-CM: 288.60  7/12/2017 - Present        Lethargy ICD-10-CM: R53.83  ICD-9-CM: 780.79  7/12/2017 - Present        Vomiting ICD-10-CM: R11.10  ICD-9-CM: 787.03  7/12/2017 - Present        FTT (failure to thrive) in adult ICD-10-CM: R62.7  ICD-9-CM: 783.7  7/12/2017 - Present        Debilitated patient ICD-10-CM: R53.81  ICD-9-CM: 799.3  7/12/2017 - Present        Anemia ICD-10-CM: D64.9  ICD-9-CM: 285.9  6/29/2015 - Present        Left kidney mass ICD-10-CM: N28.89  ICD-9-CM: 593.9  6/29/2015 - Present        ESRD (end stage renal disease) ([facility] ICD-10-CM: N18.6  ICD-9-CM: 585.6  6/29/2015 - Present        Diastolic CHF, chronic (HCC) ICD-10-CM: I50.32  ICD-9-CM: 428.32, 428.0  Unknown - Present        Chronic respiratory failure with hypoxia (HCC) ICD-10-CM: J96.11  ICD-9-CM: 518.83, 799.02  Unknown - Present        DM type 2 causing renal disease (HCC) ICD-10-CM: E11.29  ICD-9-CM: 250.40  Unknown - Present        HTN (hypertension) ICD-10-CM: I10  ICD-9-CM: 401.9  12/27/2012 - Present        Coronary atherosclerosis of native coronary artery ICD-10-CM: I25.10  ICD-9-CM: 414.01  12/27/2012 - Present        Other and unspecified hyperlipidemia ICD-10-CM: E78.5  ICD-9-CM: 272.4  12/27/2012 - Present        RESOLVED: Hypertension ICD-10-CM: I10  ICD-9-CM: 401.9  Unknown - 7/12/2017        RESOLVED: ARF (acute renal failure) (HCC) ICD-10-CM: N17.9  ICD-9-CM: 584.9  6/29/2015 - 6/29/2015        RESOLVED: Ascites ICD-10-CM: R18.8  ICD-9-CM: 789.59  6/29/2015 - 7/12/2017        RESOLVED: Pleural effusion on right ICD-10-CM: J90  ICD-9-CM: 511.9  6/29/2015 - 7/12/2017        RESOLVED: Cystitis ICD-10-CM: N30.90  ICD-9-CM: 595.9  6/29/2015 - 7/12/2017        RESOLVED: Abdominal pain ICD-10-CM: R10.9  ICD-9-CM: 789.00  6/29/2015 - 7/12/2017        RESOLVED: CAD (coronary artery disease) ICD-10-CM: I25.10  ICD-9-CM: 414.00  Unknown - 7/12/2017        RESOLVED: Chronic diastolic heart failure ([facility] ICD-10-CM: I50.32  ICD-9-CM: 428.32  5/31/2013 - 7/12/2017        RESOLVED: Altered mental status ICD-10-CM: R41.82  ICD-9-CM: 780.97  1/3/2013 - 6/29/2015        RESOLVED: DM (diabetes mellitus) ([facility] ICD-10-CM: E11.9  ICD-9-CM: 250.00  12/27/2012 - 6/29/2015        RESOLVED: Chronic airway obstruction, not elsewhere classified ICD-10-CM: J44.9  ICD-9-CM: 044  12/27/2012 - 7/12/2017        RESOLVED: Tobacco abuse ICD-10-CM: Z72.0  ICD-9-CM: 305.1  12/27/2012 - 7/12/2017              Subjective:    71 y.o.  female who presented to the Emergency Department complaining of fatigue and diarrhea. She provides minimal hx. Per family, diarrhea for >4weeks. Lower abd pain for 2 days, with bilious vomiting last night  L great toe/Foot infection, not improving on Bactrim x3 weeks. Tolerating HD up to yesterday, but today in septic shock. Falling at home. We will admit her for management (dr Ben Jaramillo)    7/13: She reports she is feeling some better. Little pain at this time. Underwent left 1st ray amputation today by podiatry for gas gangrene. Sigmoid by Dr Olegario Gary on 7/13 revealed ischemic colitis. Requiring may for BP support. 7/14:  Reports a little foot pain. No further diarrhea she reports.   No ab pain.  Still on Anant but weaning. Planned angio LEs today per vascular surg. Review of Systems:   A comprehensive review of systems was negative except for that written in the HPI. Objective:   Physical Exam:     Visit Vitals    /43    Pulse 66    Temp 98.7 °F (37.1 °C)    Resp 17    Ht 5' 6\" (1.676 m)    Wt 67.5 kg (148 lb 13 oz)    SpO2 92%    BMI 24.02 kg/m2    O2 Flow Rate (L/min): 5 l/min O2 Device: Nasal cannula    Temp (24hrs), Av °F (36.7 °C), Min:97.4 °F (36.3 °C), Max:98.8 °F (37.1 °C)        1901 -  0700  In: 4875.2 [P.O.:60; I.V.:4453.9]  Out: 750 [Drains:750]    General:  Alert, cooperative, no distress, appears stated age. Head:  Normocephalic, without obvious abnormality, atraumatic. Eyes:  Conjunctivae/corneas clear. EOMs intact. Throat: Lips, mucosa, and tongue moist..   Neck: Supple, symmetrical, trachea midline, no adenopathy, thyroid: no enlargement/tenderness/nodules, no carotid bruit and no JVD. Lungs:   Scattered rhonchi but otherwise clear to auscultation bilaterally. Heart:  Regular rate and rhythm, S1, S2 normal, no murmur, click, rub or gallop. Abdomen:   Soft, non-tender. Bowel sounds normal. No masses,  No organomegaly. Extremities: no cyanosis or edema. No calf tenderness or cords. There is a dialysis shunt in the L-upper arm. Dressing dry over left foot. Pulses: Trace in LEs. Skin:  turgor normal.    Neurologic: CNII-XII intact. Alert and oriented X 3. Fine motor of hands and fingers normal.   equal.  No cogwheeling or rigidity. Gait not tested at this time. Sensation grossly normal to touch.   Gross motor of extremities normal.       Data Review:       Recent Days:  Recent Labs      17   1753  17   0447  17   2330  17   1402   WBC   --   25.8*   --   28.2*   HGB  10.9*  11.3*  9.7*  10.0*   HCT  33.6*  35.6  29.9*  31.3*   PLT   --   217   --   185     Recent Labs      17   0447  17 1402   NA  141  137   K  3.7  4.1   CL  105  100   CO2  25  26   GLU  127*  115*   BUN  36*  32*   CREA  5.46*  5.60*   CA  8.4*  9.4   MG  1.7   --    PHOS  4.4   --    ALB  2.2*  2.4*   TBILI  1.8*  1.6*   SGOT  18  24   ALT  13  11*     No results for input(s): PH, PCO2, PO2, HCO3, FIO2 in the last 72 hours.     24 Hour Results:  Recent Results (from the past 24 hour(s))   CULTURE, WOUND W GRAM STAIN    Collection Time: 07/13/17  8:57 AM   Result Value Ref Range    Special Requests: LEFT  TOE        GRAM STAIN OCCASIONAL WBCS SEEN      GRAM STAIN OCCASIONAL GRAM POSITIVE COCCI IN PAIRS      Culture result: PENDING    GLUCOSE, POC    Collection Time: 07/13/17  9:26 AM   Result Value Ref Range    Glucose (POC) 105 (H) 65 - 100 mg/dL    Performed by Mert Rowe    GLUCOSE, POC    Collection Time: 07/13/17 11:26 AM   Result Value Ref Range    Glucose (POC) 102 (H) 65 - 100 mg/dL    Performed by Jose Mina    GLUCOSE, POC    Collection Time: 07/13/17  2:53 PM   Result Value Ref Range    Glucose (POC) 100 65 - 100 mg/dL    Performed by Jose Mina    HGB & HCT    Collection Time: 07/13/17  5:53 PM   Result Value Ref Range    HGB 10.9 (L) 11.5 - 16.0 g/dL    HCT 33.6 (L) 35.0 - 47.0 %   GLUCOSE, POC    Collection Time: 07/14/17  1:04 AM   Result Value Ref Range    Glucose (POC) 110 (H) 65 - 100 mg/dL    Performed by Phillip 25, POC    Collection Time: 07/14/17  5:39 AM   Result Value Ref Range    Glucose (POC) 119 (H) 65 - 100 mg/dL    Performed by Antwon Guevara        Medications reviewed  Current Facility-Administered Medications   Medication Dose Route Frequency    famotidine (PF) (PEPCID) 20 mg in sodium chloride 0.9 % 10 mL injection  20 mg IntraVENous DIALYSIS TUE, THU & SAT    vancomycin (VANCOCIN) 500 mg in 0.9% sodium chloride (MBP/ADV) 100 mL  500 mg IntraVENous PRN    And    Vancomycin - Administer dose after each HD session   Other DIALYSIS TUE, THU & SAT    [START ON 7/15/2017] Vancomycin Random Level - draw with morning labs on 7/15/2017   Other PRN    sevelamer (RENAGEL) tablet 1,600 mg  1,600 mg Oral TID WITH MEALS    sevelamer (RENAGEL) tablet 800 mg  800 mg Oral BID PRN    sodium chloride (NS) flush 5-10 mL  5-10 mL IntraVENous PRN    piperacillin-tazobactam (ZOSYN) 3.375 g in 0.9% sodium chloride (MBP/ADV) 100 mL  3.375 g IntraVENous Q12H    metroNIDAZOLE (FLAGYL) IVPB premix 500 mg  500 mg IntraVENous Q6H    albuterol-ipratropium (DUO-NEB) 2.5 MG-0.5 MG/3 ML  3 mL Nebulization Q6H PRN    glucose chewable tablet 16 g  4 Tab Oral PRN    dextrose (D50W) injection syrg 12.5-25 g  12.5-25 g IntraVENous PRN    glucagon (GLUCAGEN) injection 1 mg  1 mg IntraMUSCular PRN    0.9% sodium chloride infusion  75 mL/hr IntraVENous CONTINUOUS    acetaminophen (TYLENOL) tablet 650 mg  650 mg Oral Q4H PRN    diphenhydrAMINE (BENADRYL) capsule 25 mg  25 mg Oral Q4H PRN    ondansetron (ZOFRAN) injection 4 mg  4 mg IntraVENous Q4H PRN    insulin lispro (HUMALOG) injection   SubCUTAneous Q6H    fluticasone-vilanterol (BREO ELLIPTA) 100mcg-25mcg/puff  1 Puff Inhalation DAILY    sodium hypochlorite (HALF STRENGTH DAKIN'S) 0.25% irrigation (bottle)   Topical DAILY    PHENYLephrine (NEOSYNEPHRINE) 30,000 mcg in 0.9% sodium chloride 250 mL infusion   mcg/min IntraVENous TITRATE    0.9% sodium chloride infusion 250 mL  250 mL IntraVENous PRN    Vancomycin: pharmacy dosing   Other Rx Dosing/Monitoring       Care Plan discussed with: Patient and Nurse  Total time spent with patient: 30 minutes.     Selvin Doe MD

## 2017-07-14 NOTE — PROGRESS NOTES
Problem: Falls - Risk of  Goal: *Absence of falls  Outcome: Progressing Towards Goal     BEDSIDE REPORT - ASSUME CARE     0700: Bedside shift change report received by Alphonso Medrano RN. Report given with SBAR, Kardex, Intake/Output, Recent Results and Cardiac Rhythm NSR. Opportunity for questions and clarification was provided. Assumed care of patient. Safety instructions given and acknowledged by pt. Problem: Pressure Injury - Risk of  Goal: *Prevention of pressure ulcer  Outcome: Progressing Towards Goal  Patient turns self frequently. Q1H rounding. Problem: Surgical Pathway Post-Op Day 2 through Discharge  Goal: Activity/Safety  Outcome: Not Progressing Towards Goal  Remains on BR due to impending angiogram.  Goal: Nutrition/Diet  Outcome: Not Progressing Towards Goal  NPO due to impending procedure. Goal: Medications  Outcome: Progressing Towards Goal  See MAR. Goal: Respiratory  Outcome: Progressing Towards Goal  NC 5 l to maintain SPO2 above 90%. Desats without O2 to low 80s. Goal: Treatments/Interventions/Procedures  Outcome: Progressing Towards Goal  In progress. Goal: *Optimal pain control at patients stated goal  Outcome: Progressing Towards Goal  0800: Tylenol 650 mg given po for discomfort in L foot. L foot slightly elevated. Goal: *Adequate urinary output (equal to or greater than 30 milliliters/hour)  Outcome: Not Met  Anuric. On dialysis. Goal: *Hemodynamically stable  Outcome: Progressing Towards Goal  Remains on may gtt. Attempting to titrate downward. 1115: Stool to lab for C. Diff and other labs to dept.  1600: To Angio. 1700: Rec'd pt. From angio after SBAR report from RN. Pt. Alert. R groin d/i.  1800: Titrated may gtt down from 60 mcg-5 mcg/min to keep sys BP > 90. Late Note: BEDSIDE REPORT TO ONCOMING RN    1900: Bedside shift change report given to RN (oncoming nurse) by Haylie Capone RN (offgoing nurse). Report given with SBAR, Kardex, Intake/Output and Recent Results. Opportunity for questions and clarification was provided.

## 2017-07-14 NOTE — PROGRESS NOTES
Lauryn Wiley  YOB: 1945          Assessment & Plan:   1. ESRD  · TTS  · HD  7 13 17  · 1 kg off yesterday  · If remains on pressures, may need CRRT  2. Diarrhea  3. CAD/AS  4. DM  5. S/P Ray ampulatation         Subjective:   CC:ESRD  HPI: Patient seen for HD need  She is on pressures.   Feels better     ROS:no cp/sob/n/v  Current Facility-Administered Medications   Medication Dose Route Frequency    famotidine (PF) (PEPCID) 20 mg in sodium chloride 0.9 % 10 mL injection  20 mg IntraVENous DIALYSIS TUE, THU & SAT    vancomycin (VANCOCIN) 500 mg in 0.9% sodium chloride (MBP/ADV) 100 mL  500 mg IntraVENous PRN    And    Vancomycin - Administer dose after each HD session   Other DIALYSIS TUE, THU & SAT    [START ON 7/15/2017] Vancomycin Random Level - draw with morning labs on 7/15/2017   Other PRN    sevelamer (RENAGEL) tablet 1,600 mg  1,600 mg Oral TID WITH MEALS    sevelamer (RENAGEL) tablet 800 mg  800 mg Oral BID PRN    sodium chloride (NS) flush 5-10 mL  5-10 mL IntraVENous PRN    piperacillin-tazobactam (ZOSYN) 3.375 g in 0.9% sodium chloride (MBP/ADV) 100 mL  3.375 g IntraVENous Q12H    metroNIDAZOLE (FLAGYL) IVPB premix 500 mg  500 mg IntraVENous Q6H    albuterol-ipratropium (DUO-NEB) 2.5 MG-0.5 MG/3 ML  3 mL Nebulization Q6H PRN    glucose chewable tablet 16 g  4 Tab Oral PRN    dextrose (D50W) injection syrg 12.5-25 g  12.5-25 g IntraVENous PRN    glucagon (GLUCAGEN) injection 1 mg  1 mg IntraMUSCular PRN    0.9% sodium chloride infusion  75 mL/hr IntraVENous CONTINUOUS    acetaminophen (TYLENOL) tablet 650 mg  650 mg Oral Q4H PRN    diphenhydrAMINE (BENADRYL) capsule 25 mg  25 mg Oral Q4H PRN    ondansetron (ZOFRAN) injection 4 mg  4 mg IntraVENous Q4H PRN    insulin lispro (HUMALOG) injection   SubCUTAneous Q6H    fluticasone-vilanterol (BREO ELLIPTA) 100mcg-25mcg/puff  1 Puff Inhalation DAILY    sodium hypochlorite (HALF STRENGTH DAKIN'S) 0.25% irrigation (bottle)   Topical DAILY    PHENYLephrine (NEOSYNEPHRINE) 30,000 mcg in 0.9% sodium chloride 250 mL infusion   mcg/min IntraVENous TITRATE    0.9% sodium chloride infusion 250 mL  250 mL IntraVENous PRN    Vancomycin: pharmacy dosing   Other Rx Dosing/Monitoring          Objective:     Vitals:  Blood pressure 129/43, pulse 66, temperature 98.7 °F (37.1 °C), resp. rate 17, height 5' 6\" (1.676 m), weight 67.5 kg (148 lb 13 oz), SpO2 92 %. Temp (24hrs), Av °F (36.7 °C), Min:97.4 °F (36.3 °C), Max:98.8 °F (37.1 °C)      Intake and Output:      1901 -  0700  In: 4875.2 [P.O.:60; I.V.:4453.9]  Out: 750 [Drains:750]    Physical Exam:                Patient is intubated:  no    Physical Examination:   GENERAL ASSESSMENT: NAD  HEENT:Nontraumatic   CHEST: CTA  HEART: S1S2  ABDOMEN: Soft,NT,     EXTREMITY: EDEMA n  NEURO:Grossly non focal          ECG/rhythm:    Data Review      No results for input(s): TNIPOC in the last 72 hours. No lab exists for component: ITNL   Recent Labs      17   1402   TROIQ  <0.04     Recent Labs      17   1753  17   0447  17   2330  17   1402   NA   --   141   --   137   K   --   3.7   --   4.1   CL   --   105   --   100   CO2   --   25   --   26   BUN   --   36*   --   32*   CREA   --   5.46*   --   5.60*   GLU   --   127*   --   115*   PHOS   --   4.4   --    --    MG   --   1.7   --    --    CA   --   8.4*   --   9.4   ALB   --   2.2*   --   2.4*   WBC   --   25.8*   --   28.2*   HGB  10.9*  11.3*  9.7*  10.0*   HCT  33.6*  35.6  29.9*  31.3*   PLT   --   217   --   185      No results for input(s): INR, PTP, APTT in the last 72 hours.     No lab exists for component: INREXT, INREXT  Needs: urine analysis, urine sodium, protein and creatinine  No results found for: Kathleen Pisano      Discussed with:  Nursing    : Francis Boss MD  2017        [facility]:  [email or web address]  Karla Ghosh office:  [address] [place] [address], [address] [place] [address],[address]  [place], [address] [facility]  Phone: [phone]  Fax :     [phone]    [place] office:  [address] [facility], [address] [place] [address]  Phone - [phone]  Fax - [phone]

## 2017-07-15 NOTE — CONSULTS
[facility]     Name: Juli Brito MRN: [id number]   : 1945 Hospital: [address]   Date: 7/15/2017        Impression Plan   1. Septic shock  2. 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 Abnormal CT chest- chronic scarring RLL with chronic right pleural effusion  4. Ischemic colitis per CT  5. Diarrhea requiring rectal tube--C. Diff neg  6. GI bleed- hgb stable  7. PVD  8. Leukocytosis  9. Hx of COPD  10. Hx of ESRD               · Continue vanc; stop Zosyn and change to Merrem given acinetobacter and pseudomonas in wound cx  · Can stop Flagyl  · Added fiber and loperamide to help improve diarrhea  · Titrate may for MAP>60 or SBP >100  · Hgb stable  · Breo  · Vascular surgery re-eval after trial to see if angioplasty helped  · Hold [facility] for now- restart per surgical team  · Famotidine  · Renal diet with Banatrol and supplements       Pt is critically ill and at risk for hemodynamic decompensation. Critical care time spent with pt exclusive of procedures was 33 minutes    Radiology  ( personally reviewed) CXR: right pleural effusion and scarring- stable for atleast 2 years   ABG No results for input(s): PHI, PO2I, PCO2I in the last 72 hours. Subjective     Overnight events:  Remains on may 5mcg. On RA. Tolerating PO diet.       Past Medical History:   Diagnosis Date    CAD (coronary artery disease)     Chronic obstructive pulmonary disease (HCC)     emphysema    Chronic respiratory failure with hypoxia (HCC)     Diastolic CHF, chronic (HCC)     DM type 2 causing renal disease ([address]     ESRD (end stage renal disease) (HCC)     GERD (gastroesophageal reflux disease)     Hypertension     Hypothyroidism       Past Surgical History:   Procedure Laterality Date    COLONOSCOPY N/A 2017    COLONOSCOPY performed by Suellen Rossi MD at [facility] ENDOSCOPY    HX AORTIC VALVE REPLACEMENT      HX CORONARY ARTERY BYPASS GRAFT      HX TUBAL LIGATION        Prior to Admission medications    Medication Sig Start Date End Date Taking? Authorizing Provider   SITagliptin (JANUVIA) 25 mg tablet Take 25 mg by mouth daily. Yes Debo Villatoro MD   sevelamer carbonate (RENVELA) 800 mg tab tab Take 1,600 mg by mouth three (3) times daily. 1 tab bid with meals   Yes Debo Villatoro MD   simvastatin (ZOCOR) 20 mg tablet Take 20 mg by mouth nightly. Yes Historical Provider   trimethoprim-sulfamethoxazole (BACTRIM DS) 160-800 mg per tablet Take 1 Tab by mouth two (2) times a day. 6/12/17  Yes Historical Provider   albuterol (PROAIR HFA) 90 mcg/actuation inhaler Take 2 Puffs by inhalation every four (4) hours as needed for Wheezing. Yes Historical Provider   sevelamer (RENAGEL) 800 mg tablet Take 800 mg by mouth daily. With snacks   Yes Historical Provider   isosorbide mononitrate ER (IMDUR) 30 mg tablet Take 30 mg by mouth daily. Yes Debo Villatoro MD   ferrous sulfate 325 mg (65 mg iron) tablet Take 325 mg by mouth daily. Yes Historical Provider   metoprolol tartrate (LOPRESSOR) 25 mg tablet Take 12.5 mg by mouth two (2) times a day. Yes Historical Provider   tiotropium (SPIRIVA) 18 mcg inhalation capsule Take 1 Cap by inhalation daily. Yes Historical Provider   fluticasone-salmeterol (ADVAIR DISKUS) 250-50 mcg/dose diskus inhaler Take 1 Puff by inhalation every twelve (12) hours. Yes Historical Provider   pantoprazole (PROTONIX) 40 mg tablet Take 40 mg by mouth two (2) times a day. Yes Historical Provider   amLODIPine (NORVASC) 10 mg tablet Take 10 mg by mouth daily.    Yes Historical Provider     Current Facility-Administered Medications   Medication Dose Route Frequency    guar gum (BENEFIBER) packet 1 Packet  4 g Oral TID    loperamide (IMODIUM) capsule 4 mg  4 mg Oral ONCE    loperamide (IMODIUM) capsule 2 mg  2 mg Oral QID    famotidine (PF) (PEPCID) 20 mg in sodium chloride 0.9 % 10 mL injection  20 mg IntraVENous DIALYSIS TUE, THU & SAT    Vancomycin - Administer dose after each HD session   Other DIALYSIS TUE, THU & SAT    sevelamer (RENAGEL) tablet 1,600 mg  1,600 mg Oral TID WITH MEALS    piperacillin-tazobactam (ZOSYN) 3.375 g in 0.9% sodium chloride (MBP/ADV) 100 mL  3.375 g IntraVENous Q12H    metroNIDAZOLE (FLAGYL) IVPB premix 500 mg  500 mg IntraVENous Q6H    0.9% sodium chloride infusion  75 mL/hr IntraVENous CONTINUOUS    insulin lispro (HUMALOG) injection   SubCUTAneous Q6H    fluticasone-vilanterol (BREO ELLIPTA) 100mcg-25mcg/puff  1 Puff Inhalation DAILY    sodium hypochlorite (HALF STRENGTH DAKIN'S) 0.25% irrigation (bottle)   Topical DAILY    PHENYLephrine (NEOSYNEPHRINE) 30,000 mcg in 0.9% sodium chloride 250 mL infusion   mcg/min IntraVENous TITRATE     No Known Allergies   Social History   Substance Use Topics    Smoking status: Former Smoker     Packs/day: 0.50     Quit date: 4/29/2015    Smokeless tobacco: Never Used    Alcohol use No      Family History   Problem Relation Age of Onset    Heart Disease Mother     Stroke Mother           Laboratory: I have personally reviewed the critical care flowsheet and labs.      Recent Labs      07/15/17   0240  07/14/17   2030  07/14/17   1105   WBC  16.6*  14.5*  20.4*   HGB  9.6*  9.4*  10.0*   HCT  29.0*  28.8*  30.9*   PLT  174  152  202     Recent Labs      07/15/17   0240  07/14/17   1105  07/13/17   0447   NA  139  142  141   K  3.8  3.7  3.7   CL  105  107  105   CO2  21  23  25   GLU  148*  102*  127*   BUN  37*  33*  36*   CREA  5.33*  4.79*  5.46*   CA  8.1*  8.8  8.4*   MG  1.7  1.8  1.7   PHOS   --    --   4.4   ALB  2.1*  2.2*  2.2*   SGOT  25  14*  18   ALT  16  11*  13       Objective:     Mode Rate Tidal Volume Pressure FiO2 PEEP                    Vital Signs:     TMAX(24)      Intake/Output:   Last shift:         Last 3 shifts:  [id number]    Intake/Output Summary (Last 24 hours) at 07/15/17 1335  Last data filed at 07/15/17 0620   Gross per 24 hour Intake          2421.33 ml   Output              300 ml   Net          2121.33 ml     EXAM:   GENERAL: chronically ill appearing in no distress, awake, alert HEENT:  PERRL, EOMI, no alar flaring or epistaxis, oral mucosa moist without cyanosis, NECK:  no jugular vein distention, no retractions, no thyromegaly or masses, LUNGS: diminished, no w/r/r, HEART:  Regular rate and rhythm with no MGR; no edema is present, ABDOMEN:  soft with no tenderness, bowel sounds present, EXTREMITIES:  Left toe dressing c/d/i.  No edema  , SKIN:  no jaundice or ecchymosis and NEUROLOGIC:  grossly non-focal    Farheen Nunez MD  [facility]

## 2017-07-15 NOTE — PROGRESS NOTES
1900 Bedside and Verbal shift change report given to Jeanie N Zackery Mckeon (oncoming nurse) by Toby Núñez (offgoing nurse). Report included the following information SBAR, Kardex, Intake/Output, MAR, Recent Results and Cardiac Rhythm sinus rhythm. 1930 Talked with Lajune Simmonds HD nurse. She will perform HD on this patient when she finishes another patient on the unit. 2030 Pt has pulled her central line out, minimal bleeding. Site cleaned, dressing placed. 2130 Dr Maki Sam advised of pt central line status. Pt off phenylephrine at this time, will monitor. At the existing low dose, may go peripheral as needed. 2145 Pt starting HD.  2230 Dialysis nurse advises difficulty with access pressures. Large clot produced from access line. Dialysis nurse will continue to try to perform HD.  0000 HD unable to be performed. 0030 PT starting to c/o wanting to leave, needing to leave. Pt confused, stating she needs to go home so she can see doctor in the morning. Pt yelling out for her son, demanding to go home. Pt knows she is at [facility] and that she had surgery to her toes. Pt able to be verbally redirected; however, pt requires multiple re-direction and re-orientation attempts. 0100 Contact Dr Maki Sam to advise of unable to perform HD at this time. Verbal order to stop NS at 75/hr. Throughout shift pt continuously removing removing nasal canula      Pt did not sleep throughout the shift, only dozed. Pt was talking with people not present and catching bugs in the room. 0700 Bedside and Verbal shift change report given to Toby Núñez (oncoming nurse) by Jeanie Mckeon (offgoing nurse). Report included the following information SBAR, Kardex, Intake/Output, MAR, Recent Results and Cardiac Rhythm sinus arrhythmia.

## 2017-07-15 NOTE — PROGRESS NOTES
Daily Progress Note: 7/15/2017  Eun Guardado MD    Assessment/Plan:   Severe Sepsis / Shock / Leukocytosis - POA, ischemic colitis and DM foot ulcer/gas gangrene. Sepsis protocol. Trend lactate. Hydrate. Empiric Abx (Vanco, flagyl, zosyn). Follow blood and stool cx. Currently on Anant     DM foot ulcer/Gas gangrene left toe/left hallux and 1st interspace gas gangrene - POA, continue antibiotics. Podiatry did left 1st Ray Amputation 7/13.     Diarrhea - likely from Ischemic Colitis - diarrhea now resolved.      FTT (failure to thrive) in adult / Lethargy / Debilitated patient / Vomiting - POA, worse than baseline due to sepsis. Fall precautions. Will need PT/OT eval when better. Palliative consulted     Chronic diastolic heart failure -   Hydrate as needed, and allow HD to handle final fluid volume.      ESRD (end stage renal disease) - Consult renal to handle HD.  Renal diet when eating. Continue sevelamer when eating.      Anemia - POA presumed related to ESRD. She takes iron, resume when eating      Coronary atherosclerosis of native coronary artery / HTN (hypertension) - No acute issues. Monitor tele. Appreciate Cardiology consult. Resume IMDUR, metoprolol and norvasc when able     Chronic respiratory failure with hypoxia / Chronic airway obstruction, not elsewhere classified/Chronic scarring RLL with chronic right pleural effusion - Per Pulmonary. Continue Advair, and prn duonebs. Oxygen as needed.      Hyperlipidemia - Continue zocor. No evidence of benefit in Pt with ESRD. I would stop it, and hold for now      Hypothyroidism - No longer listing synthroid. Check TSH.     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 Prior A1c was 5.7.       DJD / Neuropathy - Prn tylenol    Ischemic Colitis: Sigmoid done 7/13 by Dr Tex Leslie - per GI.        Problem List:  Problem List as of 7/15/2017  Date Reviewed: 6/29/2015          Codes Class Noted - Resolved    Sepsis ([facility] ICD-10-CM: A41.9  ICD-9-CM: 038.9, 995.91  7/12/2017 - Present        Shock ([facility] ICD-10-CM: R57.9  ICD-9-CM: 785.50  7/12/2017 - Present        Diarrhea ICD-10-CM: R19.7  ICD-9-CM: 787.91  7/12/2017 - Present        Hypothyroidism ICD-10-CM: E03.9  ICD-9-CM: 244.9  Unknown - Present        GERD (gastroesophageal reflux disease) ICD-10-CM: K21.9  ICD-9-CM: 530.81  Unknown - Present        Chronic obstructive pulmonary disease ([facility] ICD-10-CM: J44.9  ICD-9-CM: 090  Unknown - Present    Overview Signed 7/12/2017  3:31 PM by Destinee Culp MD     emphysema             DM foot ulcer ([place] [address].) ICD-10-CM: E11.621, L97.509  ICD-9-CM: 250.80, 707.15  7/12/2017 - Present        Leukocytosis ICD-10-CM: P07.255  ICD-9-CM: 288.60  7/12/2017 - Present        Lethargy ICD-10-CM: R53.83  ICD-9-CM: 780.79  7/12/2017 - Present        Vomiting ICD-10-CM: R11.10  ICD-9-CM: 787.03  7/12/2017 - Present        FTT (failure to thrive) in adult ICD-10-CM: R62.7  ICD-9-CM: 783.7  7/12/2017 - Present        Debilitated patient ICD-10-CM: R53.81  ICD-9-CM: 799.3  7/12/2017 - Present        Anemia ICD-10-CM: D64.9  ICD-9-CM: 285.9  6/29/2015 - Present        Left kidney mass ICD-10-CM: N28.89  ICD-9-CM: 593.9  6/29/2015 - Present        ESRD (end stage renal disease) ([facility] ICD-10-CM: N18.6  ICD-9-CM: 585.6  6/29/2015 - Present        Diastolic CHF, chronic (HCC) ICD-10-CM: I50.32  ICD-9-CM: 428.32, 428.0  Unknown - Present        Chronic respiratory failure with hypoxia (HCC) ICD-10-CM: J96.11  ICD-9-CM: 518.83, 799.02  Unknown - Present        DM type 2 causing renal disease (HCC) ICD-10-CM: E11.29  ICD-9-CM: 250.40  Unknown - Present        HTN (hypertension) ICD-10-CM: I10  ICD-9-CM: 401.9  12/27/2012 - Present        Coronary atherosclerosis of native coronary artery ICD-10-CM: I25.10  ICD-9-CM: 414.01  12/27/2012 - Present        Other and unspecified hyperlipidemia ICD-10-CM: E78.5  ICD-9-CM: 272.4  12/27/2012 - Present        RESOLVED: Hypertension ICD-10-CM: I10  ICD-9-CM: 401.9  Unknown - 7/12/2017        RESOLVED: ARF (acute renal failure) (HCC) ICD-10-CM: N17.9  ICD-9-CM: 584.9  6/29/2015 - 6/29/2015        RESOLVED: Ascites ICD-10-CM: R18.8  ICD-9-CM: 789.59  6/29/2015 - 7/12/2017        RESOLVED: Pleural effusion on right ICD-10-CM: J90  ICD-9-CM: 511.9  6/29/2015 - 7/12/2017        RESOLVED: Cystitis ICD-10-CM: N30.90  ICD-9-CM: 595.9  6/29/2015 - 7/12/2017        RESOLVED: Abdominal pain ICD-10-CM: R10.9  ICD-9-CM: 789.00  6/29/2015 - 7/12/2017        RESOLVED: CAD (coronary artery disease) ICD-10-CM: I25.10  ICD-9-CM: 414.00  Unknown - 7/12/2017        RESOLVED: Chronic diastolic heart failure ([facility] ICD-10-CM: I50.32  ICD-9-CM: 428.32  5/31/2013 - 7/12/2017        RESOLVED: Altered mental status ICD-10-CM: R41.82  ICD-9-CM: 780.97  1/3/2013 - 6/29/2015        RESOLVED: DM (diabetes mellitus) ([facility] ICD-10-CM: E11.9  ICD-9-CM: 250.00  12/27/2012 - 6/29/2015        RESOLVED: Chronic airway obstruction, not elsewhere classified ICD-10-CM: J44.9  ICD-9-CM: 772  12/27/2012 - 7/12/2017        RESOLVED: Tobacco abuse ICD-10-CM: Z72.0  ICD-9-CM: 305.1  12/27/2012 - 7/12/2017              Subjective:    71 y.o.  female who presented to the Emergency Department complaining of fatigue and diarrhea. She provides minimal hx. Per family, diarrhea for >4weeks. Lower abd pain for 2 days, with bilious vomiting last night  L great toe/Foot infection, not improving on Bactrim x3 weeks. Tolerating HD up to yesterday, but today in septic shock. Falling at home. We will admit her for management (dr Nathalia Knowles)    7/13: She reports she is feeling some better. Little pain at this time. Underwent left 1st ray amputation today by podiatry for gas gangrene. Sigmoid by Dr Velazco Early on 7/13 revealed ischemic colitis. Requiring may for BP support. 7/14:  Reports a little foot pain. No further diarrhea she reports.   No ab pain.  Still on Anant but weaning. Planned angio LEs today per vascular surg. 7/15: Reports some pain in her foot. She is still on Anant to keep SBP >90. Nurses report no pulse palpable in her right foot and foot is cool to touch. Palliative care was consulted. Had angioplasty of left iliac yesterday. Review of Systems:   A comprehensive review of systems was negative except for that written in the HPI. Objective:   Physical Exam:     Visit Vitals    /57    Pulse (!) 57    Temp 98.3 °F (36.8 °C)    Resp 24    Ht 5' 6\" (1.676 m)    Wt 159 lb 9.8 oz (72.4 kg)    SpO2 92%    BMI 25.76 kg/m2    O2 Flow Rate (L/min): 4 l/min O2 Device: Nasal cannula    Temp (24hrs), Av.7 °F (36.5 °C), Min:96.5 °F (35.8 °C), Max:98.5 °F (36.9 °C)         1901 - 07/15 0700  In: 5475.5 [P.O.:60; I.V.:5415.5]  Out: 1050 [Drains:1050]    General:  Alert, cooperative, no distress, appears stated age. Head:  Normocephalic, without obvious abnormality, atraumatic. Eyes:  Conjunctivae/corneas clear. EOMs intact. Throat: Lips, mucosa, and tongue moist..   Neck: Supple, symmetrical, trachea midline, no adenopathy, thyroid: no enlargement/tenderness/nodules, no carotid bruit and no JVD. Lungs:   Scattered rhonchi but otherwise clear to auscultation bilaterally. Heart:  Regular rate and rhythm, S1, S2 normal, no murmur, click, rub or gallop. Abdomen:   Soft, non-tender. Bowel sounds normal. No masses,  No organomegaly. Extremities: no cyanosis or edema. No calf tenderness or cords. There is a dialysis shunt in the L-upper arm. Dressing dry over left foot. Pulses: No pulses palpable in the LEs   Skin:  turgor normal.    Neurologic: CNII-XII intact. Alert and oriented X 3. Fine motor of hands and fingers normal.   equal.  No cogwheeling or rigidity. Gait not tested at this time. Sensation grossly normal to touch.   Gross motor of extremities normal.       Data Review:       Recent Days:  Recent Labs      07/15/17   0240  07/14/17   2030  07/14/17   1105   WBC  16.6*  14.5*  20.4*   HGB  9.6*  9.4*  10.0*   HCT  29.0*  28.8*  30.9*   PLT  174  152  202     Recent Labs      07/15/17   0240  07/14/17   1105  07/13/17   0447   NA  139  142  141   K  3.8  3.7  3.7   CL  105  107  105   CO2  21  23  25   GLU  148*  102*  127*   BUN  37*  33*  36*   CREA  5.33*  4.79*  5.46*   CA  8.1*  8.8  8.4*   MG  1.7  1.8  1.7   PHOS   --    --   4.4   ALB  2.1*  2.2*  2.2*   TBILI  1.1*  1.0  1.8*   SGOT  25  14*  18   ALT  16  11*  13       24 Hour Results:  Recent Results (from the past 24 hour(s))   CULTURE, STOOL    Collection Time: 07/14/17 11:05 AM   Result Value Ref Range    Special Requests: NO SPECIAL REQUESTS      Campylobacter antigen NEGATIVE      Culture result:        NO ROUTINE ENTERIC PATHOGENS ISOLATED INCLUDING SALMONELLA, SHIGELLA, YERSINIA, VIBRIO OR SHIGA TOXIN PRODUCING E. COLI SO FAR    Culture result: NO COLIFORMS ISOLATED      Culture result: MODERATE YEAST (A)     C. DIFFICILE (DNA)    Collection Time: 07/14/17 11:05 AM   Result Value Ref Range    C. difficile (DNA) NEGATIVE  NEG     METABOLIC PANEL, COMPREHENSIVE    Collection Time: 07/14/17 11:05 AM   Result Value Ref Range    Sodium 142 136 - 145 mmol/L    Potassium 3.7 3.5 - 5.1 mmol/L    Chloride 107 97 - 108 mmol/L    CO2 23 21 - 32 mmol/L    Anion gap 12 5 - 15 mmol/L    Glucose 102 (H) 65 - 100 mg/dL    BUN 33 (H) 6 - 20 MG/DL    Creatinine 4.79 (H) 0.55 - 1.02 MG/DL    BUN/Creatinine ratio 7 (L) 12 - 20      GFR est AA 11 (L) >60 ml/min/1.73m2    GFR est non-AA 9 (L) >60 ml/min/1.73m2    Calcium 8.8 8.5 - 10.1 MG/DL    Bilirubin, total 1.0 0.2 - 1.0 MG/DL    ALT (SGPT) 11 (L) 12 - 78 U/L    AST (SGOT) 14 (L) 15 - 37 U/L    Alk.  phosphatase 125 (H) 45 - 117 U/L    Protein, total 7.2 6.4 - 8.2 g/dL    Albumin 2.2 (L) 3.5 - 5.0 g/dL    Globulin 5.0 (H) 2.0 - 4.0 g/dL    A-G Ratio 0.4 (L) 1.1 - 2.2     MAGNESIUM    Collection Time: 07/14/17 11:05 AM   Result Value Ref Range    Magnesium 1.8 1.6 - 2.4 mg/dL   CBC WITH AUTOMATED DIFF    Collection Time: 07/14/17 11:05 AM   Result Value Ref Range    WBC 20.4 (H) 3.6 - 11.0 K/uL    RBC 3.39 (L) 3.80 - 5.20 M/uL    HGB 10.0 (L) 11.5 - 16.0 g/dL    HCT 30.9 (L) 35.0 - 47.0 %    MCV 91.2 80.0 - 99.0 FL    MCH 29.5 26.0 - 34.0 PG    MCHC 32.4 30.0 - 36.5 g/dL    RDW 18.0 (H) 11.5 - 14.5 %    PLATELET 165 739 - 843 K/uL    NEUTROPHILS 90 (H) 32 - 75 %    LYMPHOCYTES 4 (L) 12 - 49 %    MONOCYTES 6 5 - 13 %    EOSINOPHILS 0 0 - 7 %    BASOPHILS 0 0 - 1 %    ABS. NEUTROPHILS 18.4 (H) 1.8 - 8.0 K/UL    ABS. LYMPHOCYTES 0.8 0.8 - 3.5 K/UL    ABS. MONOCYTES 1.2 (H) 0.0 - 1.0 K/UL    ABS. EOSINOPHILS 0.0 0.0 - 0.4 K/UL    ABS.  BASOPHILS 0.0 0.0 - 0.1 K/UL    DF SMEAR SCANNED      RBC COMMENTS ANISOCYTOSIS  1+       NUCLEATED RBC    Collection Time: 07/14/17 11:05 AM   Result Value Ref Range    NRBC 1.1 (H) 0  WBC    ABSOLUTE NRBC 0.22 (H) 0.00 - 0.01 K/uL    WBC CORRECTED FOR NR ADJUSTED FOR NUCLEATED RBC'S     GLUCOSE, POC    Collection Time: 07/14/17  1:40 PM   Result Value Ref Range    Glucose (POC) 77 65 - 100 mg/dL    Performed by [employer]    GLUCOSE, POC    Collection Time: 07/14/17  1:48 PM   Result Value Ref Range    Glucose (POC) 183 (H) 65 - 100 mg/dL    Performed by [employer]    GLUCOSE, POC    Collection Time: 07/14/17  5:34 PM   Result Value Ref Range    Glucose (POC) 70 65 - 100 mg/dL    Performed by Michial Gravel    GLUCOSE, POC    Collection Time: 07/14/17  5:39 PM   Result Value Ref Range    Glucose (POC) 161 (H) 65 - 100 mg/dL    Performed by Ruthy Ureña    CBC WITH AUTOMATED DIFF    Collection Time: 07/14/17  8:30 PM   Result Value Ref Range    WBC 14.5 (H) 3.6 - 11.0 K/uL    RBC 3.14 (L) 3.80 - 5.20 M/uL    HGB 9.4 (L) 11.5 - 16.0 g/dL    HCT 28.8 (L) 35.0 - 47.0 %    MCV 91.7 80.0 - 99.0 FL    MCH 29.9 26.0 - 34.0 PG    MCHC 32.6 30.0 - 36.5 g/dL    RDW 18.3 (H) 11.5 - 14.5 %    PLATELET 416 150 - 400 K/uL    NEUTROPHILS 87 (H) 32 - 75 %    LYMPHOCYTES 6 (L) 12 - 49 %    MONOCYTES 7 5 - 13 %    EOSINOPHILS 0 0 - 7 %    BASOPHILS 0 0 - 1 %    ABS. NEUTROPHILS 12.6 (H) 1.8 - 8.0 K/UL    ABS. LYMPHOCYTES 0.9 0.8 - 3.5 K/UL    ABS. MONOCYTES 1.0 0.0 - 1.0 K/UL    ABS. EOSINOPHILS 0.0 0.0 - 0.4 K/UL    ABS. BASOPHILS 0.0 0.0 - 0.1 K/UL    DF SMEAR SCANNED      RBC COMMENTS POIKILOCYTOSIS  2+        RBC COMMENTS LOREN CELLS  PRESENT        RBC COMMENTS OVALOCYTES  PRESENT        RBC COMMENTS ANISOCYTOSIS  1+        RBC COMMENTS MACROCYTOSIS  1+       NUCLEATED RBC    Collection Time: 07/14/17  8:30 PM   Result Value Ref Range    NRBC 1.1 (H) 0  WBC    ABSOLUTE NRBC 0.16 (H) 0.00 - 0.01 K/uL    WBC CORRECTED FOR NR ADJUSTED FOR NUCLEATED RBC'S     GLUCOSE, POC    Collection Time: 07/14/17 10:48 PM   Result Value Ref Range    Glucose (POC) 59 (L) 65 - 100 mg/dL    Performed by Ashlyn Negro    GLUCOSE, POC    Collection Time: 07/14/17 10:54 PM   Result Value Ref Range    Glucose (POC) 54 (L) 65 - 100 mg/dL    Performed by Ashlyn Negro    GLUCOSE, POC    Collection Time: 07/14/17 11:10 PM   Result Value Ref Range    Glucose (POC) 158 (H) 65 - 100 mg/dL    Performed by Verónica Walker, RANDOM    Collection Time: 07/15/17  2:40 AM   Result Value Ref Range    Vancomycin, random 78.0 UG/ML   METABOLIC PANEL, COMPREHENSIVE    Collection Time: 07/15/17  2:40 AM   Result Value Ref Range    Sodium 139 136 - 145 mmol/L    Potassium 3.8 3.5 - 5.1 mmol/L    Chloride 105 97 - 108 mmol/L    CO2 21 21 - 32 mmol/L    Anion gap 13 5 - 15 mmol/L    Glucose 148 (H) 65 - 100 mg/dL    BUN 37 (H) 6 - 20 MG/DL    Creatinine 5.33 (H) 0.55 - 1.02 MG/DL    BUN/Creatinine ratio 7 (L) 12 - 20      GFR est AA 10 (L) >60 ml/min/1.73m2    GFR est non-AA 8 (L) >60 ml/min/1.73m2    Calcium 8.1 (L) 8.5 - 10.1 MG/DL    Bilirubin, total 1.1 (H) 0.2 - 1.0 MG/DL    ALT (SGPT) 16 12 - 78 U/L    AST (SGOT) 25 15 - 37 U/L    Alk. phosphatase 116 45 - 117 U/L    Protein, total 7.0 6.4 - 8.2 g/dL    Albumin 2.1 (L) 3.5 - 5.0 g/dL    Globulin 4.9 (H) 2.0 - 4.0 g/dL    A-G Ratio 0.4 (L) 1.1 - 2.2     MAGNESIUM    Collection Time: 07/15/17  2:40 AM   Result Value Ref Range    Magnesium 1.7 1.6 - 2.4 mg/dL   CBC WITH AUTOMATED DIFF    Collection Time: 07/15/17  2:40 AM   Result Value Ref Range    WBC 16.6 (H) 3.6 - 11.0 K/uL    RBC 3.24 (L) 3.80 - 5.20 M/uL    HGB 9.6 (L) 11.5 - 16.0 g/dL    HCT 29.0 (L) 35.0 - 47.0 %    MCV 89.5 80.0 - 99.0 FL    MCH 29.6 26.0 - 34.0 PG    MCHC 33.1 30.0 - 36.5 g/dL    RDW 17.7 (H) 11.5 - 14.5 %    PLATELET 627 169 - 218 K/uL    NEUTROPHILS 95 (H) 32 - 75 %    BAND NEUTROPHILS 1 0 - 6 %    LYMPHOCYTES 3 (L) 12 - 49 %    MONOCYTES 0 (L) 5 - 13 %    EOSINOPHILS 0 0 - 7 %    BASOPHILS 0 0 - 1 %    METAMYELOCYTES 1 (H) 0 %    NRBC 2.0 (H) 0  WBC    ABS. NEUTROPHILS 15.9 (H) 1.8 - 8.0 K/UL    ABS. LYMPHOCYTES 0.5 (L) 0.8 - 3.5 K/UL    ABS. MONOCYTES 0.0 0.0 - 1.0 K/UL    ABS. EOSINOPHILS 0.0 0.0 - 0.4 K/UL    ABS.  BASOPHILS 0.0 0.0 - 0.1 K/UL    DF MANUAL      RBC COMMENTS ANISOCYTOSIS  1+        RBC COMMENTS MACROCYTOSIS  2+       NUCLEATED RBC    Collection Time: 07/15/17  2:40 AM   Result Value Ref Range    NRBC 1.0 (H) 0  WBC    ABSOLUTE NRBC 0.17 (H) 0.00 - 0.01 K/uL    WBC CORRECTED FOR NR ADJUSTED FOR NUCLEATED RBC'S     GLUCOSE, POC    Collection Time: 07/15/17  6:58 AM   Result Value Ref Range    Glucose (POC) 94 65 - 100 mg/dL    Performed by [employer]        Medications reviewed  Current Facility-Administered Medications   Medication Dose Route Frequency    fentaNYL citrate (PF) injection  mcg   mcg IntraVENous Multiple    heparin (porcine) 1,000 unit/mL injection 1,000-5,000 Units  1,000-5,000 Units IntraVENous Multiple    iopamidol (ISOVUE-370) 76 % injection 100-200 mL  100-200 mL IntraVENous Multiple    lidocaine (XYLOCAINE) 10 mg/mL (1 %) injection 10-30 mL  10-30 mL SubCUTAneous Multiple    midazolam (VERSED) injection 0.5-10 mg  0.5-10 mg IntraVENous Multiple    famotidine (PF) (PEPCID) 20 mg in sodium chloride 0.9 % 10 mL injection  20 mg IntraVENous DIALYSIS TUE, THU & SAT    vancomycin (VANCOCIN) 500 mg in 0.9% sodium chloride (MBP/ADV) 100 mL  500 mg IntraVENous PRN    And    Vancomycin - Administer dose after each HD session   Other DIALYSIS TUE, THU & SAT    sevelamer (RENAGEL) tablet 1,600 mg  1,600 mg Oral TID WITH MEALS    sevelamer (RENAGEL) tablet 800 mg  800 mg Oral BID PRN    sodium chloride (NS) flush 5-10 mL  5-10 mL IntraVENous PRN    piperacillin-tazobactam (ZOSYN) 3.375 g in 0.9% sodium chloride (MBP/ADV) 100 mL  3.375 g IntraVENous Q12H    metroNIDAZOLE (FLAGYL) IVPB premix 500 mg  500 mg IntraVENous Q6H    albuterol-ipratropium (DUO-NEB) 2.5 MG-0.5 MG/3 ML  3 mL Nebulization Q6H PRN    glucose chewable tablet 16 g  4 Tab Oral PRN    dextrose (D50W) injection syrg 12.5-25 g  12.5-25 g IntraVENous PRN    glucagon (GLUCAGEN) injection 1 mg  1 mg IntraMUSCular PRN    0.9% sodium chloride infusion  75 mL/hr IntraVENous CONTINUOUS    acetaminophen (TYLENOL) tablet 650 mg  650 mg Oral Q4H PRN    diphenhydrAMINE (BENADRYL) capsule 25 mg  25 mg Oral Q4H PRN    ondansetron (ZOFRAN) injection 4 mg  4 mg IntraVENous Q4H PRN    insulin lispro (HUMALOG) injection   SubCUTAneous Q6H    fluticasone-vilanterol (BREO ELLIPTA) 100mcg-25mcg/puff  1 Puff Inhalation DAILY    sodium hypochlorite (HALF STRENGTH DAKIN'S) 0.25% irrigation (bottle)   Topical DAILY    PHENYLephrine (NEOSYNEPHRINE) 30,000 mcg in 0.9% sodium chloride 250 mL infusion   mcg/min IntraVENous TITRATE    0.9% sodium chloride infusion 250 mL  250 mL IntraVENous PRN       Care Plan discussed with: Patient and Nurse  Total time spent with patient: 30 minutes.     Oxana Clements MD

## 2017-07-15 NOTE — PROGRESS NOTES
Problem: Falls - Risk of  Goal: *Absence of falls  Outcome: Progressing Towards Goal     BEDSIDE REPORT - ASSUME CARE     0700: Bedside shift change report received by Donya Altamirano RN. Report given with SBAR, Kardex, Intake/Output, Recent Results and Cardiac Rhythm NSR. Opportunity for questions and clarification was provided. Assumed care of patient. Safety instructions given and acknowledged by pt. Problem: Pressure Injury - Risk of  Goal: *Prevention of pressure ulcer  Outcome: Progressing Towards Goal  Patient turns self frequently. Q1H rounding. Problem: Surgical Pathway Post-Op Day 2 through Discharge  Goal: Activity/Safety  Outcome: Not Progressing Towards Goal  Remains on BR due to impending angiogram.  Goal: Nutrition/Diet  Outcome: Not Progressing Towards Goal  NPO due to impending procedure. Goal: Medications  Outcome: Progressing Towards Goal  See MAR. Goal: Respiratory  Outcome: Progressing Towards Goal  NC 5 l to maintain SPO2 above 90%. Desats without O2 to low 80s. Goal: Treatments/Interventions/Procedures  Outcome: Progressing Towards Goal  In progress. Goal: *Optimal pain control at patients stated goal  Outcome: Progressing Towards Goal  0800: Tylenol 650 mg given po for discomfort in L foot. L foot slightly elevated. Goal: *Adequate urinary output (equal to or greater than 30 milliliters/hour)  Outcome: Not Met  Anuric. On dialysis. Goal: *Hemodynamically stable  Outcome: Progressing Towards Goal  Remains on may gtt. Attempting to titrate downward. BEDSIDE REPORT TO ONCOMING RN    1900: Bedside shift change report given to RN (oncoming nurse) by RN (offgoing nurse). Report given with SBAR, Kardex, Intake/Output and Recent Results. Opportunity for questions and clarification was provided.

## 2017-07-15 NOTE — PROGRESS NOTES
Bedside and Verbal shift change report given to Flavia Walsh RN (oncoming nurse) by Bre Vega RN (offgoing nurse). Report included the following information SBAR, Kardex, ED Summary, OR Summary, Procedure Summary, Intake/Output, MAR and Recent Results. 18 Paged Dr. Moon Doing in reference to absent pulses on patients right leg. Pt has a procedure in Angio with Dr. Omero Bell yesterday for evaluation of that leg. Dopplered right leg which was cool to touch and unable to  any pulses. Pt is on a Anant drip. Asked Dr. Sarai Wyatt if MD wanted me to notify Dr. Omero Bell but MD said no and  that she would check it in the morning.  Also informed MD that patient had been hypoglycemic and had treat

## 2017-07-15 NOTE — PROGRESS NOTES
[facility] Dosing Services: Antimicrobial Stewardship Daily Doc    Consult for antibiotic dosing of Vancomycin by Dr. Nathalia Knowles  Indication: sepsis  Day of Therapy 4    Vancomycin therapy:  Current maintenance dose:  500 mg IV after each HD session on Tu/Th/Sa. Goal maintain trough 15-20 mcg/mL. 7/15/17 AM level 16.5 mcg/ml  Plan:continue current dose    Pharmacist will follow daily and will make changes to dose and/or frequency based on clinical status.   Pharmacist Jose Alberto Sanchez                                       [id number]

## 2017-07-16 NOTE — PROGRESS NOTES
Problem: Falls - Risk of  Goal: *Absence of falls  Outcome: Progressing Towards Goal     BEDSIDE REPORT - ASSUME CARE     0700: Bedside shift change report received by Thaddeus Sims RN. Report given with SBAR, Kardex, Intake/Output, Recent Results and Cardiac Rhythm NSR. Opportunity for questions and clarification was provided. Assumed care of patient. Safety instructions given and acknowledged by pt. Problem: Pressure Injury - Risk of  Goal: *Prevention of pressure ulcer  Outcome: Progressing Towards Goal  Patient turns self frequently. Q1H rounding. Problem: Surgical Pathway Post-Op Day 2 through Discharge  Goal: Activity/Safety  Outcome: Not Progressing Towards Goal  Remains on BR due to impending angiogram.  Goal: Nutrition/Diet  Outcome: Not Progressing Towards Goal  NPO due to impending procedure. Goal: Medications  Outcome: Progressing Towards Goal  See MAR. Goal: Respiratory  Outcome: Progressing Towards Goal  NC 4 l to maintain SPO2 above 90%. Goal: Treatments/Interventions/Procedures  Outcome: Progressing Towards Goal  In progress. Goal: *Optimal pain control at patients stated goal  Outcome: Progressing Towards Goal  Goal: *Adequate urinary output (equal to or greater than 30 milliliters/hour)  Outcome: Not Met  Anuric. On dialysis. Goal: *Hemodynamically stable  Outcome: Progressing Towards Goal  Remains off may gtt. Pt. Remains very confused. Summary: HD not done today. +Bruit, thrill. Poor appetite. Given Ensure and assisted with feeding. R foot cool to cold to knee. L foot dressing d/i. More confused today. Large amts. Of liquid stool around flexiseal.  Pericare completed Q2H. BEDSIDE REPORT TO ONCOMING RN    1900: Bedside shift change report given to RN (oncoming nurse) by RN (offgoing nurse). Report given with SBAR, Kardex, Intake/Output and Recent Results. Opportunity for questions and clarification was provided.

## 2017-07-16 NOTE — PROGRESS NOTES
Shift In Report:  Bedside and Verbal shift change report received from Rosalba Quinones RN (offgoing nurse) by Feranndo Felder RN (oncoming). Report included the following information SBAR, Kardex, Intake/Output, MAR and Recent Results. Shift Out Report: Bedside and Verbal shift change report given to Nhan Blank RN  (oncoming nurse) by Fernando Felder RN (offgoing nurse). Report included the following information SBAR, Kardex, Intake/Output, MAR and Recent Results.

## 2017-07-16 NOTE — PROGRESS NOTES
Daily Progress Note: 7/16/2017  Lucinda Ramos MD    Assessment/Plan:   Severe Sepsis / Shock / Leukocytosis - POA, ischemic colitis and DM foot ulcer/gas gangrene. Sepsis protocol. Trend lactate. Hydrate. Empiric Abx (Vanco, l, zosyn). Follow blood and stool cx. Off Anant     DM foot ulcer/Gas gangrene left toe/left hallux and 1st interspace gas gangrene - POA, continue antibiotics. Podiatry did left 1st Ray Amputation 7/13.     Diarrhea - likely from Ischemic Colitis - Immodium     FTT (failure to thrive) in adult / Lethargy / Debilitated patient / Vomiting - POA, worse than baseline due to sepsis. Fall precautions. Will need PT/OT eval when better. Palliative consulted     Chronic diastolic heart failure -   Hydrate as needed, and allow HD to handle final fluid volume.      ESRD (end stage renal disease) - Consult renal to handle HD.  Renal diet when eating. Continue sevelamer when eating.      Anemia - POA presumed related to ESRD. She takes iron, resume when eating      Coronary atherosclerosis of native coronary artery / HTN (hypertension) - No acute issues. Monitor tele. Appreciate Cardiology consult. Resume IMDUR, metoprolol and norvasc when able     Chronic respiratory failure with hypoxia / Chronic airway obstruction, not elsewhere classified/Chronic scarring RLL with chronic right pleural effusion - Per Pulmonary. Continue Advair, and prn duonebs. Oxygen as needed.      Hyperlipidemia - Continue zocor. No evidence of benefit in Pt with ESRD. I would stop it, and hold for now      Hypothyroidism - No longer listing synthroid. Check TSH.     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 Prior A1c was 5.7.       DJD / Neuropathy - Prn tylenol    Ischemic Colitis: Sigmoid done 7/13 by Dr Tip Davidson - per GI.        Problem List:  Problem List as of 7/16/2017  Date Reviewed: 6/29/2015          Codes Class Noted - Resolved    Sepsis ([facility] [address] ICD-10-CM: A41.9  ICD-9-CM: 038.9, 995.91  7/12/2017 - Present        Shock ([facility] ICD-10-CM: R57.9  ICD-9-CM: 785.50  7/12/2017 - Present        Diarrhea ICD-10-CM: R19.7  ICD-9-CM: 787.91  7/12/2017 - Present        Hypothyroidism ICD-10-CM: E03.9  ICD-9-CM: 244.9  Unknown - Present        GERD (gastroesophageal reflux disease) ICD-10-CM: K21.9  ICD-9-CM: 530.81  Unknown - Present        Chronic obstructive pulmonary disease ([facility] ICD-10-CM: J44.9  ICD-9-CM: 314  Unknown - Present    Overview Signed 7/12/2017  3:31 PM by Krissy Mojica MD     emphysema             DM foot ulcer ([facility] ICD-10-CM: E11.621, L97.509  ICD-9-CM: 250.80, 707.15  7/12/2017 - Present        Leukocytosis ICD-10-CM: X53.116  ICD-9-CM: 288.60  7/12/2017 - Present        Lethargy ICD-10-CM: R53.83  ICD-9-CM: 780.79  7/12/2017 - Present        Vomiting ICD-10-CM: R11.10  ICD-9-CM: 787.03  7/12/2017 - Present        FTT (failure to thrive) in adult ICD-10-CM: R62.7  ICD-9-CM: 783.7  7/12/2017 - Present        Debilitated patient ICD-10-CM: R53.81  ICD-9-CM: 799.3  7/12/2017 - Present        Anemia ICD-10-CM: D64.9  ICD-9-CM: 285.9  6/29/2015 - Present        Left kidney mass ICD-10-CM: N28.89  ICD-9-CM: 593.9  6/29/2015 - Present        ESRD (end stage renal disease) ([facility] ICD-10-CM: N18.6  ICD-9-CM: 585.6  6/29/2015 - Present        Diastolic CHF, chronic (HCC) ICD-10-CM: I50.32  ICD-9-CM: 428.32, 428.0  Unknown - Present        Chronic respiratory failure with hypoxia (HCC) ICD-10-CM: J96.11  ICD-9-CM: 518.83, 799.02  Unknown - Present        DM type 2 causing renal disease (HCC) ICD-10-CM: E11.29  ICD-9-CM: 250.40  Unknown - Present        HTN (hypertension) ICD-10-CM: I10  ICD-9-CM: 401.9  12/27/2012 - Present        Coronary atherosclerosis of native coronary artery ICD-10-CM: I25.10  ICD-9-CM: 414.01  12/27/2012 - Present        Other and unspecified hyperlipidemia ICD-10-CM: E78.5  ICD-9-CM: 272.4  12/27/2012 - Present        RESOLVED: Hypertension ICD-10-CM: I10  ICD-9-CM: 401.9  Unknown - 7/12/2017        RESOLVED: ARF (acute renal failure) (HCC) ICD-10-CM: N17.9  ICD-9-CM: 584.9  6/29/2015 - 6/29/2015        RESOLVED: Ascites ICD-10-CM: R18.8  ICD-9-CM: 789.59  6/29/2015 - 7/12/2017        RESOLVED: Pleural effusion on right ICD-10-CM: J90  ICD-9-CM: 511.9  6/29/2015 - 7/12/2017        RESOLVED: Cystitis ICD-10-CM: N30.90  ICD-9-CM: 595.9  6/29/2015 - 7/12/2017        RESOLVED: Abdominal pain ICD-10-CM: R10.9  ICD-9-CM: 789.00  6/29/2015 - 7/12/2017        RESOLVED: CAD (coronary artery disease) ICD-10-CM: I25.10  ICD-9-CM: 414.00  Unknown - 7/12/2017        RESOLVED: Chronic diastolic heart failure ([facility] ICD-10-CM: I50.32  ICD-9-CM: 428.32  5/31/2013 - 7/12/2017        RESOLVED: Altered mental status ICD-10-CM: R41.82  ICD-9-CM: 780.97  1/3/2013 - 6/29/2015        RESOLVED: DM (diabetes mellitus) ([facility] ICD-10-CM: E11.9  ICD-9-CM: 250.00  12/27/2012 - 6/29/2015        RESOLVED: Chronic airway obstruction, not elsewhere classified ICD-10-CM: J44.9  ICD-9-CM: 790  12/27/2012 - 7/12/2017        RESOLVED: Tobacco abuse ICD-10-CM: Z72.0  ICD-9-CM: 305.1  12/27/2012 - 7/12/2017              Subjective:    71 y.o.  female who presented to the Emergency Department complaining of fatigue and diarrhea. She provides minimal hx. Per family, diarrhea for >4weeks. Lower abd pain for 2 days, with bilious vomiting last night  L great toe/Foot infection, not improving on Bactrim x3 weeks. Tolerating HD up to yesterday, but today in septic shock. Falling at home. We will admit her for management (dr Ramon Delgado)    7/13: She reports she is feeling some better. Little pain at this time. Underwent left 1st ray amputation today by podiatry for gas gangrene. Sigmoid by Dr Naz Wylie on 7/13 revealed ischemic colitis. Requiring anant for BP support. 7/14:  Reports a little foot pain. No further diarrhea she reports. No ab pain.   Still on Anant but weaning. Planned angio LEs today per vascular surg. 7/15: Reports some pain in her foot. She is still on Anant to keep SBP >90. Nurses report no pulse palpable in her right foot and foot is cool to touch. Palliative care was consulted. Had angioplasty of left iliac yesterday. : Nurses report she pulled her central line out last night. More confused at night. Was not able to complete dialysis due to clotting. BP has been ok off Anant. Review of Systems:   A comprehensive review of systems was negative except for that written in the HPI. Objective:   Physical Exam:     Visit Vitals    /46    Pulse 64    Temp 97.6 °F (36.4 °C)    Resp 22    Ht 5' 6\" (1.676 m)    Wt 159 lb 9.8 oz (72.4 kg)    SpO2 (!) 88%    BMI 25.76 kg/m2    O2 Flow Rate (L/min): 4 l/min O2 Device: Nasal cannula    Temp (24hrs), Av.8 °F (36.6 °C), Min:97.6 °F (36.4 °C), Max:98.5 °F (36.9 °C)        1901 -  0700  In: 2551.3 [P.O.:120; I.V.:2431.3]  Out: -     General:  Alert, cooperative, no distress, appears stated age. Head:  Normocephalic, without obvious abnormality, atraumatic. Eyes:  Conjunctivae/corneas clear. EOMs intact. Throat: Lips, mucosa, and tongue moist..   Neck: Supple, symmetrical, trachea midline, no adenopathy, thyroid: no enlargement/tenderness/nodules, no carotid bruit and no JVD. Lungs:   Scattered rhonchi but otherwise clear to auscultation bilaterally. Heart:  Regular rate and rhythm, S1, S2 normal, no murmur, click, rub or gallop. Abdomen:   Soft, non-tender. Bowel sounds normal. No masses,  No organomegaly. Extremities: no cyanosis or edema. No calf tenderness or cords. There is a dialysis shunt in the L-upper arm. Dressing dry over left foot. Pulses: No pulses palpable in the LEs   Skin:  turgor normal.    Neurologic: CNII-XII intact. Alert and oriented X 3. Fine motor of hands and fingers normal.   equal.  No cogwheeling or rigidity.   Gait not tested at this time. Sensation grossly normal to touch. Gross motor of extremities normal.       Data Review:       Recent Days:  Recent Labs      07/16/17   0359  07/15/17   0240  07/14/17   2030   WBC  17.0*  16.6*  14.5*   HGB  9.8*  9.6*  9.4*   HCT  29.7*  29.0*  28.8*   PLT  187  174  152     Recent Labs      07/16/17   0359  07/15/17   0240  07/14/17   1105   NA  140  139  142   K  4.5  3.8  3.7   CL  108  105  107   CO2  17*  21  23   GLU  119*  148*  102*   BUN  40*  37*  33*   CREA  5.62*  5.33*  4.79*   CA  8.8  8.1*  8.8   MG  1.9  1.7  1.8   ALB  1.9*  2.1*  2.2*   TBILI  1.3*  1.1*  1.0   SGOT  96*  25  14*   ALT  26  16  11*       24 Hour Results:  Recent Results (from the past 24 hour(s))   GLUCOSE, POC    Collection Time: 07/15/17 12:17 PM   Result Value Ref Range    Glucose (POC) 143 (H) 65 - 100 mg/dL    Performed by Charisse Collier    GLUCOSE, POC    Collection Time: 07/15/17  6:43 PM   Result Value Ref Range    Glucose (POC) 119 (H) 65 - 100 mg/dL    Performed by Charisse Collier    GLUCOSE, POC    Collection Time: 07/16/17 12:21 AM   Result Value Ref Range    Glucose (POC) 80 65 - 100 mg/dL    Performed by Angelica Chowdhury    CBC WITH AUTOMATED DIFF    Collection Time: 07/16/17  3:59 AM   Result Value Ref Range    WBC 17.0 (H) 3.6 - 11.0 K/uL    RBC 3.33 (L) 3.80 - 5.20 M/uL    HGB 9.8 (L) 11.5 - 16.0 g/dL    HCT 29.7 (L) 35.0 - 47.0 %    MCV 89.2 80.0 - 99.0 FL    MCH 29.4 26.0 - 34.0 PG    MCHC 33.0 30.0 - 36.5 g/dL    RDW 18.2 (H) 11.5 - 14.5 %    PLATELET 842 000 - 956 K/uL    NEUTROPHILS 87 (H) 32 - 75 %    LYMPHOCYTES 6 (L) 12 - 49 %    MONOCYTES 4 (L) 5 - 13 %    EOSINOPHILS 2 0 - 7 %    BASOPHILS 0 0 - 1 %    MYELOCYTES 1 (H) 0 %    NRBC 3.0 (H) 0  WBC    ABS. NEUTROPHILS 14.8 (H) 1.8 - 8.0 K/UL    ABS. LYMPHOCYTES 1.0 0.8 - 3.5 K/UL    ABS. MONOCYTES 0.7 0.0 - 1.0 K/UL    ABS. EOSINOPHILS 0.3 0.0 - 0.4 K/UL    ABS.  BASOPHILS 0.0 0.0 - 0.1 K/UL    DF MANUAL      RBC COMMENTS ANISOCYTOSIS  2+ METABOLIC PANEL, COMPREHENSIVE    Collection Time: 07/16/17  3:59 AM   Result Value Ref Range    Sodium 140 136 - 145 mmol/L    Potassium 4.5 3.5 - 5.1 mmol/L    Chloride 108 97 - 108 mmol/L    CO2 17 (L) 21 - 32 mmol/L    Anion gap 15 5 - 15 mmol/L    Glucose 119 (H) 65 - 100 mg/dL    BUN 40 (H) 6 - 20 MG/DL    Creatinine 5.62 (H) 0.55 - 1.02 MG/DL    BUN/Creatinine ratio 7 (L) 12 - 20      GFR est AA 9 (L) >60 ml/min/1.73m2    GFR est non-AA 7 (L) >60 ml/min/1.73m2    Calcium 8.8 8.5 - 10.1 MG/DL    Bilirubin, total 1.3 (H) 0.2 - 1.0 MG/DL    ALT (SGPT) 26 12 - 78 U/L    AST (SGOT) 96 (H) 15 - 37 U/L    Alk.  phosphatase 99 45 - 117 U/L    Protein, total 6.7 6.4 - 8.2 g/dL    Albumin 1.9 (L) 3.5 - 5.0 g/dL    Globulin 4.8 (H) 2.0 - 4.0 g/dL    A-G Ratio 0.4 (L) 1.1 - 2.2     MAGNESIUM    Collection Time: 07/16/17  3:59 AM   Result Value Ref Range    Magnesium 1.9 1.6 - 2.4 mg/dL   NUCLEATED RBC    Collection Time: 07/16/17  3:59 AM   Result Value Ref Range    NRBC 2.2 (H) 0  WBC    ABSOLUTE NRBC 0.37 (H) 0.00 - 0.01 K/uL    WBC CORRECTED FOR NR ADJUSTED FOR NUCLEATED RBC'S     GLUCOSE, POC    Collection Time: 07/16/17  7:23 AM   Result Value Ref Range    Glucose (POC) 111 (H) 65 - 100 mg/dL    Performed by Angelica Chowdhury        Medications reviewed  Current Facility-Administered Medications   Medication Dose Route Frequency    QUEtiapine (SEROquel) tablet 12.5 mg  12.5 mg Oral BID    guar gum (BENEFIBER) packet 1 Packet  4 g Oral TID    loperamide (IMODIUM) capsule 2 mg  2 mg Oral QID    meropenem (MERREM) 500 mg in 0.9% sodium chloride (MBP/ADV) 50 mL  500 mg IntraVENous Q24H    fentaNYL citrate (PF) injection  mcg   mcg IntraVENous Multiple    heparin (porcine) 1,000 unit/mL injection 1,000-5,000 Units  1,000-5,000 Units IntraVENous Multiple    iopamidol (ISOVUE-370) 76 % injection 100-200 mL  100-200 mL IntraVENous Multiple    lidocaine (XYLOCAINE) 10 mg/mL (1 %) injection 10-30 mL 10-30 mL SubCUTAneous Multiple    midazolam (VERSED) injection 0.5-10 mg  0.5-10 mg IntraVENous Multiple    famotidine (PF) (PEPCID) 20 mg in sodium chloride 0.9 % 10 mL injection  20 mg IntraVENous DIALYSIS TUE, THU & SAT    vancomycin (VANCOCIN) 500 mg in 0.9% sodium chloride (MBP/ADV) 100 mL  500 mg IntraVENous PRN    And    Vancomycin - Administer dose after each HD session   Other DIALYSIS TUE, THU & SAT    sevelamer (RENAGEL) tablet 1,600 mg  1,600 mg Oral TID WITH MEALS    sevelamer (RENAGEL) tablet 800 mg  800 mg Oral BID PRN    sodium chloride (NS) flush 5-10 mL  5-10 mL IntraVENous PRN    albuterol-ipratropium (DUO-NEB) 2.5 MG-0.5 MG/3 ML  3 mL Nebulization Q6H PRN    glucose chewable tablet 16 g  4 Tab Oral PRN    dextrose (D50W) injection syrg 12.5-25 g  12.5-25 g IntraVENous PRN    glucagon (GLUCAGEN) injection 1 mg  1 mg IntraMUSCular PRN    acetaminophen (TYLENOL) tablet 650 mg  650 mg Oral Q4H PRN    diphenhydrAMINE (BENADRYL) capsule 25 mg  25 mg Oral Q4H PRN    ondansetron (ZOFRAN) injection 4 mg  4 mg IntraVENous Q4H PRN    insulin lispro (HUMALOG) injection   SubCUTAneous Q6H    fluticasone-vilanterol (BREO ELLIPTA) 100mcg-25mcg/puff  1 Puff Inhalation DAILY    sodium hypochlorite (HALF STRENGTH DAKIN'S) 0.25% irrigation (bottle)   Topical DAILY    PHENYLephrine (NEOSYNEPHRINE) 30,000 mcg in 0.9% sodium chloride 250 mL infusion   mcg/min IntraVENous TITRATE    0.9% sodium chloride infusion 250 mL  250 mL IntraVENous PRN       Care Plan discussed with: Patient and Nurse and Pulm  Total time spent with patient: 30 minutes.     Emery King MD

## 2017-07-16 NOTE — PROGRESS NOTES
Yady Pope MD  [phone]           GI PROGRESS NOTE      NAME: Landon Lu   :  1945   MRN:  [id number]       Subjective: Tolerating diet, no new events. Reports no GI bleed      VITALS:   Last 24hrs VS reviewed since prior progress note. Most recent are:  Visit Vitals    BP (!) 97/38    Pulse 67    Temp 97.6 °F (36.4 °C)    Resp 20    Ht 5' 6\" (1.676 m)    Wt 72.4 kg (159 lb 9.8 oz)    SpO2 93%    BMI 25.76 kg/m2       Intake/Output Summary (Last 24 hours) at 17 1210  Last data filed at 17 0000   Gross per 24 hour   Intake          1081.25 ml   Output                0 ml   Net          1081.25 ml     PHYSICAL EXAM:  General: Alert, in no acute distress    HEENT: Anicteric sclerae. Lungs:  CTA Bilaterally.    Heart:  Regular  rhythm,    Abdomen: Soft, Non distended, Non tender.  (+)Bowel sounds, no HSM  Extremities: Necrotic toes s/p amputation  Neurologic:  Unchanged    Lab Data Reviewed:   Recent Labs      17   0359  07/15/17   0240   WBC  17.0*  16.6*   HGB  9.8*  9.6*   HCT  29.7*  29.0*   PLT  187  174     Recent Labs      17   0359  07/15/17   0240   NA  140  139   K  4.5  3.8   CL  108  105   CO2  17*  21   BUN  40*  37*   CREA  5.62*  5.33*   GLU  119*  148*   CA  8.8  8.1*     Recent Labs      17   0359  07/15/17   0240   SGOT  96*  25   AP  99  116   TP  6.7  7.0   ALB  1.9*  2.1*   GLOB  4.8*  4.9*       ________________________________________________________________________  Patient Active Problem List   Diagnosis Code    HTN (hypertension) I10    Coronary atherosclerosis of native coronary artery I25.10    Other and unspecified hyperlipidemia E78.5    Anemia D64.9    Left kidney mass N28.89    ESRD (end stage renal disease) (HCC) P06.7    Diastolic CHF, chronic (HCC) I50.32    Chronic respiratory failure with hypoxia (HCC) J96.11    DM type 2 causing renal disease (HCC) E11.29    Sepsis (HCC) A41.9  Shock ([address] R57.9    Diarrhea R19.7    Hypothyroidism E03.9    GERD (gastroesophageal reflux disease) K21.9    Chronic obstructive pulmonary disease (HCC) J44.9    DM foot ulcer (HCC) E11.621, L97.509    Leukocytosis D72.829    Lethargy R53.83    Vomiting R11.10    FTT (failure to thrive) in adult R62.7    Debilitated patient R53.81        Assessment:   Ischemic colitis with GI bleed- resolved at this point.   Continue with supportive care,antibiotics , fluids and ADAT   Plan:   Follow up PRN , please call for any questions/concerns     Signed By: Jane Retana MD     7/16/2017  12:10 PM

## 2017-07-16 NOTE — PROGRESS NOTES
Lauryn Wiley  YOB: 1945          Assessment & Plan:   1. ESRD  · TTS but did not receive dialysis on 7/15/17 due to issues with cannulation. .   · Will contact service. · Labs are fine. · Volume ok  · Requiring pressors so will hold HD. · No need for CRRT at present. · Off pressor since she pulled CVL. 2. Diarrhea  3. CAD/AS  4. DM  5. S/P Ray ampulatation         Subjective:   CC:ESRD  HPI: Patient seen for HD need  She is on pressors.   Feels better     ROS:no cp/sob/n/v  Current Facility-Administered Medications   Medication Dose Route Frequency    QUEtiapine (SEROquel) tablet 12.5 mg  12.5 mg Oral BID    guar gum (BENEFIBER) packet 1 Packet  4 g Oral TID    loperamide (IMODIUM) capsule 2 mg  2 mg Oral QID    meropenem (MERREM) 500 mg in 0.9% sodium chloride (MBP/ADV) 50 mL  500 mg IntraVENous Q24H    fentaNYL citrate (PF) injection  mcg   mcg IntraVENous Multiple    heparin (porcine) 1,000 unit/mL injection 1,000-5,000 Units  1,000-5,000 Units IntraVENous Multiple    iopamidol (ISOVUE-370) 76 % injection 100-200 mL  100-200 mL IntraVENous Multiple    lidocaine (XYLOCAINE) 10 mg/mL (1 %) injection 10-30 mL  10-30 mL SubCUTAneous Multiple    midazolam (VERSED) injection 0.5-10 mg  0.5-10 mg IntraVENous Multiple    famotidine (PF) (PEPCID) 20 mg in sodium chloride 0.9 % 10 mL injection  20 mg IntraVENous DIALYSIS TUE, THU & SAT    vancomycin (VANCOCIN) 500 mg in 0.9% sodium chloride (MBP/ADV) 100 mL  500 mg IntraVENous PRN    And    Vancomycin - Administer dose after each HD session   Other DIALYSIS TUE, THU & SAT    sevelamer (RENAGEL) tablet 1,600 mg  1,600 mg Oral TID WITH MEALS    sevelamer (RENAGEL) tablet 800 mg  800 mg Oral BID PRN    sodium chloride (NS) flush 5-10 mL  5-10 mL IntraVENous PRN    albuterol-ipratropium (DUO-NEB) 2.5 MG-0.5 MG/3 ML  3 mL Nebulization Q6H PRN    glucose chewable tablet 16 g  4 Tab Oral PRN    dextrose (D50W) injection syrg 12.5-25 g  12.5-25 g IntraVENous PRN    glucagon (GLUCAGEN) injection 1 mg  1 mg IntraMUSCular PRN    acetaminophen (TYLENOL) tablet 650 mg  650 mg Oral Q4H PRN    diphenhydrAMINE (BENADRYL) capsule 25 mg  25 mg Oral Q4H PRN    ondansetron (ZOFRAN) injection 4 mg  4 mg IntraVENous Q4H PRN    insulin lispro (HUMALOG) injection   SubCUTAneous Q6H    fluticasone-vilanterol (BREO ELLIPTA) 100mcg-25mcg/puff  1 Puff Inhalation DAILY    sodium hypochlorite (HALF STRENGTH DAKIN'S) 0.25% irrigation (bottle)   Topical DAILY    PHENYLephrine (NEOSYNEPHRINE) 30,000 mcg in 0.9% sodium chloride 250 mL infusion   mcg/min IntraVENous TITRATE    0.9% sodium chloride infusion 250 mL  250 mL IntraVENous PRN          Objective:     Vitals:  Blood pressure (!) 97/38, pulse 67, temperature 97.6 °F (36.4 °C), resp. rate 20, height 5' 6\" (1.676 m), weight 72.4 kg (159 lb 9.8 oz), SpO2 93 %. Temp (24hrs), Av.6 °F (36.4 °C), Min:97.6 °F (36.4 °C), Max:97.7 °F (36.5 °C)      Intake and Output:      1901 -  0700  In: 2551.3 [P.O.:120; I.V.:2431.3]  Out: -     Physical Exam:                Patient is intubated:  no    Physical Examination:   GENERAL ASSESSMENT: NAD  HEENT:Nontraumatic   CHEST: CTA  HEART: S1S2  ABDOMEN: Soft,NT,     EXTREMITY: EDEMA n  NEURO:Grossly non focal          ECG/rhythm:    Data Review      No results for input(s): TNIPOC in the last 72 hours. No lab exists for component: ITNL   No results for input(s): CPK, CKMB, TROIQ in the last 72 hours.   Recent Labs      17   0359  07/15/17   0240  17   2030  17   1105   NA  140  139   --   142   K  4.5  3.8   --   3.7   CL  108  105   --   107   CO2  17*  21   --   23   BUN  40*  37*   --   33*   CREA  5.62*  5.33*   --   4.79*   GLU  119*  148*   --   102*   MG  1.9  1.7   --   1.8   CA  8.8  8.1*   --   8.8   ALB  1.9*  2.1*   --   2.2*   WBC  17.0*  16.6*  14.5* 20.4*   HGB  9.8*  9.6*  9.4*  10.0*   HCT  29.7*  29.0*  28.8*  30.9*   PLT  187  174  152  202      No results for input(s): INR, PTP, APTT in the last 72 hours. No lab exists for component: INREXT, INREXT  Needs: urine analysis, urine sodium, protein and creatinine  No results found for: Kathleen Pisano      Discussed with:  Nursing    : Sher Nunez MD  7/16/2017        [facility]:  [email or web address]  Vivian Gallagher office:  [address], [address] [place] [address],[address]  [place], [address] [facility]  Phone: [phone]  Fax :     [phone]    [place] office:  [address] [place] [address], [address]  Phone - [phone]  Fax - [phone]

## 2017-07-17 NOTE — CONSULTS
[facility]     Name: Cristal Webber MRN: [id number]   : 1945 Hospital: [address]   Date: 2017        Impression Plan   1. Septic shock--off pressors as of this morning  2. 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 Abnormal CT chest- chronic scarring RLL with chronic right pleural effusion  4. Ischemic colitis per CT  5. Diarrhea requiring rectal tube--C. Diff neg  6. GI bleed- hgb stable  7. PVD  8. Leukocytosis  9. Hx of COPD  10. Hx of ESRD               · RRT per nephrology; ?fistula malfunctioning  · ? replace CVC--patient pulled it out today; currently off pressors  · Continue vanc; continue Merrem given acinetobacter and pseudomonas in wound cx  · Continue guar gum and Banatrol for diarrhea  · Consider scheduling loperamide (helped yesterday)  · Anant for MAP>60 or SBP >100  · Vascular surgery re-eval after trial of healing to see if angioplasty helped  · Hold [facility] for now- restart per surgical team  · Famotidine  · Renal diet with Banatrol and supplements         Radiology  ( personally reviewed) CXR: right pleural effusion and scarring- stable for atleast 2 years   ABG No results for input(s): PHI, PO2I, PCO2I in the last 72 hours. Subjective     Overnight events:  Pulled out CVC. Off pressors. Nursing reports fistula probably has a clot.     Past Medical History:   Diagnosis Date    CAD (coronary artery disease)     Chronic obstructive pulmonary disease (HCC)     emphysema    Chronic respiratory failure with hypoxia (HCC)     Diastolic CHF, chronic (HCC)     DM type 2 causing renal disease ([facility] [address]     ESRD (end stage renal disease) (HCC)     GERD (gastroesophageal reflux disease)     Hypertension     Hypothyroidism       Past Surgical History:   Procedure Laterality Date    COLONOSCOPY N/A 2017    COLONOSCOPY performed by Kristi Ureña MD at [facility] END[facility]    HX AORTIC VALVE REPLACEMENT      HX CORONARY ARTERY BYPASS GRAFT      HX TUBAL LIGATION        Prior to Admission medications    Medication Sig Start Date End Date Taking? Authorizing Provider   SITagliptin (JANUVIA) 25 mg tablet Take 25 mg by mouth daily. Yes Debo Villatoro MD   sevelamer carbonate (RENVELA) 800 mg tab tab Take 1,600 mg by mouth three (3) times daily. 1 tab bid with meals   Yes Debo Villatoro MD   simvastatin (ZOCOR) 20 mg tablet Take 20 mg by mouth nightly. Yes Historical Provider   trimethoprim-sulfamethoxazole (BACTRIM DS) 160-800 mg per tablet Take 1 Tab by mouth two (2) times a day. 6/12/17  Yes Historical Provider   albuterol (PROAIR HFA) 90 mcg/actuation inhaler Take 2 Puffs by inhalation every four (4) hours as needed for Wheezing. Yes Historical Provider   sevelamer (RENAGEL) 800 mg tablet Take 800 mg by mouth daily. With snacks   Yes Historical Provider   isosorbide mononitrate ER (IMDUR) 30 mg tablet Take 30 mg by mouth daily. Yes Debo Villatoro MD   ferrous sulfate 325 mg (65 mg iron) tablet Take 325 mg by mouth daily. Yes Historical Provider   metoprolol tartrate (LOPRESSOR) 25 mg tablet Take 12.5 mg by mouth two (2) times a day. Yes Historical Provider   tiotropium (SPIRIVA) 18 mcg inhalation capsule Take 1 Cap by inhalation daily. Yes Historical Provider   fluticasone-salmeterol (ADVAIR DISKUS) 250-50 mcg/dose diskus inhaler Take 1 Puff by inhalation every twelve (12) hours. Yes Historical Provider   pantoprazole (PROTONIX) 40 mg tablet Take 40 mg by mouth two (2) times a day. Yes Historical Provider   amLODIPine (NORVASC) 10 mg tablet Take 10 mg by mouth daily.    Yes Historical Provider     Current Facility-Administered Medications   Medication Dose Route Frequency    QUEtiapine (SEROquel) tablet 12.5 mg  12.5 mg Oral BID    insulin lispro (HUMALOG) injection   SubCUTAneous AC&HS    guar gum (BENEFIBER) packet 1 Packet  4 g Oral TID    meropenem (MERREM) 500 mg in 0.9% sodium chloride (MBP/ADV) 50 mL  500 mg IntraVENous Q24H  famotidine (PF) (PEPCID) 20 mg in sodium chloride 0.9 % 10 mL injection  20 mg IntraVENous DIALYSIS TUE, THU & SAT    Vancomycin - Administer dose after each HD session   Other DIALYSIS TUE, THU & SAT    sevelamer (RENAGEL) tablet 1,600 mg  1,600 mg Oral TID WITH MEALS    fluticasone-vilanterol (BREO ELLIPTA) 100mcg-25mcg/puff  1 Puff Inhalation DAILY    sodium hypochlorite (HALF STRENGTH DAKIN'S) 0.25% irrigation (bottle)   Topical DAILY    PHENYLephrine (NEOSYNEPHRINE) 30,000 mcg in 0.9% sodium chloride 250 mL infusion   mcg/min IntraVENous TITRATE     No Known Allergies   Social History   Substance Use Topics    Smoking status: Former Smoker     Packs/day: 0.50     Quit date: 4/29/2015    Smokeless tobacco: Never Used    Alcohol use No      Family History   Problem Relation Age of Onset    Heart Disease Mother     Stroke Mother           Laboratory: I have personally reviewed the critical care flowsheet and labs.      Recent Labs      07/16/17   0359  07/15/17   0240  07/14/17   2030   WBC  17.0*  16.6*  14.5*   HGB  9.8*  9.6*  9.4*   HCT  29.7*  29.0*  28.8*   PLT  187  174  152     Recent Labs      07/16/17   0359  07/15/17   0240  07/14/17   1105   NA  140  139  142   K  4.5  3.8  3.7   CL  108  105  107   CO2  17*  21  23   GLU  119*  148*  102*   BUN  40*  37*  33*   CREA  5.62*  5.33*  4.79*   CA  8.8  8.1*  8.8   MG  1.9  1.7  1.8   ALB  1.9*  2.1*  2.2*   SGOT  96*  25  14*   ALT  26  16  11*       Objective:     Mode Rate Tidal Volume Pressure FiO2 PEEP                    Vital Signs:     TMAX(24)      Intake/Output:   Last shift:         Last 3 shifts:  RRIOLAST3    Intake/Output Summary (Last 24 hours) at 07/16/17 2339  Last data filed at 07/16/17 2300   Gross per 24 hour   Intake             1010 ml   Output                0 ml   Net             1010 ml     EXAM:   GENERAL: chronically ill appearing in no distress, awake, alert HEENT:  PERRL, EOMI, no alar flaring or epistaxis, oral mucosa moist without cyanosis, NECK:  no jugular vein distention, no retractions, no thyromegaly or masses, LUNGS: diminished, no w/r/r, HEART:  Regular rate and rhythm with no MGR; no edema is present, ABDOMEN:  soft with no tenderness, bowel sounds present, EXTREMITIES:  Left toe dressing c/d/i.  No edema  , SKIN:  no jaundice or ecchymosis and NEUROLOGIC:  grossly non-focal    Sridhar Squires MD  [facility] [place] [facility]

## 2017-07-17 NOTE — ROUTINE PROCESS
0700: Bedside and Verbal shift change report received from [employer] by Jennifer Dumont RN. Report included the following information SBAR, Kardex, ED Summary, OR Summary, Intake/Output, MAR, Recent Results and Cardiac Rhythm NSR. .     1900: Bedside and Verbal shift change report given to Jordyn Chaudhary RN by Jennifer Dumont RN. Report included the following information SBAR, Kardex, ED Summary, OR Summary, Procedure Summary, Intake/Output, MAR, Accordion, Recent Results and Cardiac Rhythm Sinus arrhythmia.

## 2017-07-17 NOTE — PROCEDURES
Cally 89  *** FINAL REPORT ***    Name: Lelia Moore  MRN: [id number]    Inpatient  : 1945  HIS Order #: [id number]  [address] [facility] Visit #: [id number]  Date: 2017    TYPE OF TEST: Extremity Arterial Duplex    REASON FOR TEST  Pulseless                            Right                     Left  Artery               PSV   Finding             PSV   Finding  ------------------  -----  ---------------    -----  ---------------  External iliac:      30.1  Common femoral:      25.5  Profunda femoris:    14.6  Proximal SFA:        26.6  Mid SFA:             39.8  Distal SFA:          20.0  Popliteal:           15.7  Anterior tibial:     25.8  Occluded  Posterior tibial:    11.6  Occluded    Pressures               Right     Left               -----     -----     Brachial:   114           DP:     0         0           PT:     0         0            FELICIA:  0.00      0.00            Toe: INTERPRETATION/FINDINGS  PROCEDURE:  Color duplex ultrasound imaging of lower extremity  arteries. The exam may include pulse volume recording (PVR)  plethysmography at the ankle and/or measurement of systolic blood  pressure at the ankle and brachial level with calculation of the  ankle/brachial pressure index (FELICIA), if indicated. FINDINGS:  The right common femoral, deep femoral, superficial  femoral, popliteal, posterior tibial, and anterior tibial arteries are   visualized. Right leg :  1. Occlusion of the anterior tibial and distal posterior tibial  arteries. 2. A monophasic signal is demonstrated in the external iliac, common  femoral, profunda femoris, proximal superficial femoral,mid and distal   superficial femoral, popliteal (ak), popliteal (bk), anterior tibial  and posterior tibial arteries. 3. No obtainable flow in the right foot - unable to determine FELICIA. 4. No obtainable flow in the left foot - unable to determine FELICIA.     CONCLUSION:  Severe peripheral vascular disease indicated in the right leg. Monophasic dopplers noted throughout. Occlusion observed in the right  distal posterior tibial and anterior tibial arteries. FELICIA was not  obtained bilaterally due to severly diminished pulses. ADDITIONAL COMMENTS    I have personally reviewed the data relevant to the interpretation of  this  study. TECHNOLOGIST: Donald Bal.  Jonas Hernandez  Signed: 07/17/2017 05:13 PM    PHYSICIAN: Elvi Marti., MD  Signed: 07/20/2017 05:54 AM

## 2017-07-17 NOTE — PROGRESS NOTES
2300 Pt received laying in bed. Sleeping at this time. No signs of pain. LLE dressing clean, dry and intact. LUE has a fistula. Positive bruit. FMS present. No stool present in device collection bag. Pt is anuric per report. #20 gauge iv present in the RUE patent. 2041 pt continues to rest quietly.  No signs of distress

## 2017-07-17 NOTE — PROGRESS NOTES
Lauryn 38  YOB: 1945          Assessment & Plan:   ESRD  · HD TTS  · Did not get HD sat due to access issues  · Has thrill and bruit in LUE AVF but may have a proximal stenosis  · Attempt to access again today. If unable to run will try to get fistulagram done. If unable to get f-gram will place catheter and have access evaluated after d/c    DM foot ulcer s/p amputation    ADAM   · On sevelamer    Diarrhea/ischemic colitis    FTT/malnutrition       Subjective:   CC: f/u ESRD  HPI: To have dialysis today.    ROS: denies sob/n/v/diarrhea  Current Facility-Administered Medications   Medication Dose Route Frequency    loperamide (IMODIUM) 1 mg/5 mL oral solution 2 mg  2 mg Oral QID PRN    [START ON 7/18/2017] famotidine (PEPCID) tablet 20 mg  20 mg Oral DIALYSIS TUE, THU & SAT    QUEtiapine (SEROquel) tablet 12.5 mg  12.5 mg Oral BID    insulin lispro (HUMALOG) injection   SubCUTAneous AC&HS    guar gum (BENEFIBER) packet 1 Packet  4 g Oral TID    meropenem (MERREM) 500 mg in 0.9% sodium chloride (MBP/ADV) 50 mL  500 mg IntraVENous Q24H    sevelamer (RENAGEL) tablet 1,600 mg  1,600 mg Oral TID WITH MEALS    sevelamer (RENAGEL) tablet 800 mg  800 mg Oral BID PRN    sodium chloride (NS) flush 5-10 mL  5-10 mL IntraVENous PRN    albuterol-ipratropium (DUO-NEB) 2.5 MG-0.5 MG/3 ML  3 mL Nebulization Q6H PRN    glucose chewable tablet 16 g  4 Tab Oral PRN    dextrose (D50W) injection syrg 12.5-25 g  12.5-25 g IntraVENous PRN    glucagon (GLUCAGEN) injection 1 mg  1 mg IntraMUSCular PRN    acetaminophen (TYLENOL) tablet 650 mg  650 mg Oral Q4H PRN    diphenhydrAMINE (BENADRYL) capsule 25 mg  25 mg Oral Q4H PRN    ondansetron (ZOFRAN) injection 4 mg  4 mg IntraVENous Q4H PRN    fluticasone-vilanterol (BREO ELLIPTA) 100mcg-25mcg/puff  1 Puff Inhalation DAILY    sodium hypochlorite (HALF STRENGTH DAKIN'S) 0.25% irrigation (bottle)   Topical DAILY    PHENYLephrine (NEOSYNEPHRINE) 30,000 mcg in 0.9% sodium chloride 250 mL infusion   mcg/min IntraVENous TITRATE    0.9% sodium chloride infusion 250 mL  250 mL IntraVENous PRN          Objective:     Vitals:  Blood pressure 111/46, pulse 72, temperature 98.1 °F (36.7 °C), resp. rate 18, height 5' 6\" (1.676 m), weight 71.3 kg (157 lb 3 oz), SpO2 100 %. Temp (24hrs), Av.2 °F (36.2 °C), Min:96.9 °F (36.1 °C), Max:98.1 °F (36.7 °C)      Intake and Output:     07/15 1901 -  0700  In: 1076.3 [P.O.:560; I.V.:516.3]  Out: 0     Physical Exam:               GENERAL ASSESSMENT: Chronically ill, NAD  CHEST: CTA  HEART: S1S2 +m  ABDOMEN: Soft,NT  EXTREMITY: no EDEMA, LUE AVF +t/b; high pitched bruit proximally with lower pitch distally  NEURO:Grossly non focal          ECG/rhythm:    Data Review      No results for input(s): TNIPOC in the last 72 hours. No lab exists for component: ITNL   No results for input(s): CPK, CKMB, TROIQ in the last 72 hours. Recent Labs      17   0324  17   0359  07/15/17   0240   NA  142  140  139   K  4.0  4.5  3.8   CL  108  108  105   CO2  18*  17*  21   BUN  47*  40*  37*   CREA  6.32*  5.62*  5.33*   GLU  154*  119*  148*   MG  2.1  1.9  1.7   CA  8.4*  8.8  8.1*   ALB  2.0*  1.9*  2.1*   WBC  17.3*  17.0*  16.6*   HGB  9.3*  9.8*  9.6*   HCT  27.9*  29.7*  29.0*   PLT  203  187  174      No results for input(s): INR, PTP, APTT in the last 72 hours. No lab exists for component: INREXT  Needs: urine analysis, urine sodium, protein and creatinine  No results found for: SHAINA, CREAU      : Kal Noguera MD  2017        [facility]:  [email or web address]  Brittany Elias office:  [address] [place] [address], [address] [place] [address],[address]  [place], [address] [place] [address]  Phone: [phone]  Fax :     [phone]    [place] office:  [address] [facility], [address] [place] [address]  Phone - [phone]  Fax - [phone]

## 2017-07-17 NOTE — PROGRESS NOTES
PUL[facility]ARY ASSOCI[facility]     Name: Jojo Araujo MRN: [id number]   : 1945 Hospital: [facility]   Date: 2017        Impression Plan   1. Septic shock--off pressors. 2. 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 ESRD - On HD with metabolic acidosis. 4. Abnormal CT chest- chronic scarring RLL with chronic right pleural effusion  5. Ischemic colitis per CT  6. Diarrhea requiring rectal tube--C. Diff neg  7. GI bleed- hgb stable  8. PVD  9. Leukocytosis  10. Hx of COPD   · RRT per nephrology; ?fistula malfunctioning. Plan to attempt to dialyze today again. · NO need for CVC. Place PIV. · Cultures from wound reviewed. MSSA, Acinetobacter and pseudomonas. D/c vanc; continue Merrem. No need for flagyl for anerobes. C.diff also negative. · Continue guar gum and Banatrol for diarrhea  · add loperamide. · Anant is off now. If needed, will need to have a central line. · Vascular surgery re-eval after trial of healing to see if angioplasty helped  · Hold [facility] for now- restart per surgical team  · Transfusion indication 8.0 and below. · Famotidine  · Renal diet with Banatrol and supplements         Radiology  ( personally reviewed) CXR: right pleural effusion and scarring- stable for atleast 2 years   ABG No results for input(s): PHI, PO2I, PCO2I in the last 72 hours. Subjective     Overnight events:  Remained off pressors. Unable to HD yesterday. O x 1.     Past Medical History:   Diagnosis Date    CAD (coronary artery disease)     Chronic obstructive pulmonary disease (HCC)     emphysema    Chronic respiratory failure with hypoxia (HCC)     Diastolic CHF, chronic (HCC)     DM type 2 causing renal disease ([facility] [address]     ESRD (end stage renal disease) (HCC)     GERD (gastroesophageal reflux disease)     Hypertension     Hypothyroidism       Past Surgical History:   Procedure Laterality Date    COLONOSCOPY N/A 2017    COLON[facility] performed by Hue Louise MD Anthony at [facility] END[facility]    HX AORTIC VALVE REPLACEMENT      HX CORONARY ARTERY BYPASS GRAFT      HX TUBAL LIGATION        Prior to Admission medications    Medication Sig Start Date End Date Taking? Authorizing Provider   SITagliptin (JANUVIA) 25 mg tablet Take 25 mg by mouth daily. Yes Debo Villatoro MD   sevelamer carbonate (RENVELA) 800 mg tab tab Take 1,600 mg by mouth three (3) times daily. 1 tab bid with meals   Yes Debo Villatoro MD   simvastatin (ZOCOR) 20 mg tablet Take 20 mg by mouth nightly. Yes Historical Provider   trimethoprim-sulfamethoxazole (BACTRIM DS) 160-800 mg per tablet Take 1 Tab by mouth two (2) times a day. 6/12/17  Yes Historical Provider   albuterol (PROAIR HFA) 90 mcg/actuation inhaler Take 2 Puffs by inhalation every four (4) hours as needed for Wheezing. Yes Historical Provider   sevelamer (RENAGEL) 800 mg tablet Take 800 mg by mouth daily. With snacks   Yes Historical Provider   isosorbide mononitrate ER (IMDUR) 30 mg tablet Take 30 mg by mouth daily. Yes Debo Villatoro MD   ferrous sulfate 325 mg (65 mg iron) tablet Take 325 mg by mouth daily. Yes Historical Provider   metoprolol tartrate (LOPRESSOR) 25 mg tablet Take 12.5 mg by mouth two (2) times a day. Yes Historical Provider   tiotropium (SPIRIVA) 18 mcg inhalation capsule Take 1 Cap by inhalation daily. Yes Historical Provider   fluticasone-salmeterol (ADVAIR DISKUS) 250-50 mcg/dose diskus inhaler Take 1 Puff by inhalation every twelve (12) hours. Yes Historical Provider   pantoprazole (PROTONIX) 40 mg tablet Take 40 mg by mouth two (2) times a day. Yes Historical Provider   amLODIPine (NORVASC) 10 mg tablet Take 10 mg by mouth daily.    Yes Historical Provider     Current Facility-Administered Medications   Medication Dose Route Frequency    QUEtiapine (SEROquel) tablet 12.5 mg  12.5 mg Oral BID    insulin lispro (HUMALOG) injection   SubCUTAneous AC&HS    guar gum (BENEFIBER) packet 1 Packet  4 g Oral TID    meropenem (MERREM) 500 mg in 0.9% sodium chloride (MBP/ADV) 50 mL  500 mg IntraVENous Q24H    famotidine (PF) (PEPCID) 20 mg in sodium chloride 0.9 % 10 mL injection  20 mg IntraVENous DIALYSIS TUE, THU & SAT    Vancomycin - Administer dose after each HD session   Other DIALYSIS TUE, THU & SAT    sevelamer (RENAGEL) tablet 1,600 mg  1,600 mg Oral TID WITH MEALS    fluticasone-vilanterol (BREO ELLIPTA) 100mcg-25mcg/puff  1 Puff Inhalation DAILY    sodium hypochlorite (HALF STRENGTH DAKIN'S) 0.25% irrigation (bottle)   Topical DAILY    PHENYLephrine (NEOSYNEPHRINE) 30,000 mcg in 0.9% sodium chloride 250 mL infusion   mcg/min IntraVENous TITRATE     No Known Allergies   Social History   Substance Use Topics    Smoking status: Former Smoker     Packs/day: 0.50     Quit date: 4/29/2015    Smokeless tobacco: Never Used    Alcohol use No      Family History   Problem Relation Age of Onset    Heart Disease Mother     Stroke Mother           Laboratory: I have personally reviewed the critical care flowsheet and labs.      Recent Labs      07/17/17   0324  07/16/17   0359  07/15/17   0240   WBC  17.3*  17.0*  16.6*   HGB  9.3*  9.8*  9.6*   HCT  27.9*  29.7*  29.0*   PLT  203  187  174     Recent Labs      07/17/17   0324  07/16/17   0359  07/15/17   0240   NA  142  140  139   K  4.0  4.5  3.8   CL  108  108  105   CO2  18*  17*  21   GLU  154*  119*  148*   BUN  47*  40*  37*   CREA  6.32*  5.62*  5.33*   CA  8.4*  8.8  8.1*   MG  2.1  1.9  1.7   ALB  2.0*  1.9*  2.1*   SGOT  125*  96*  25   ALT  46  26  16       Objective:     Mode Rate Tidal Volume Pressure FiO2 PEEP                    Vital Signs:     TMAX(24)      Intake/Output:   Last shift:         Last 3 shifts:  RRIOLAST3    Intake/Output Summary (Last 24 hours) at 07/17/17 0807  Last data filed at 07/16/17 2300   Gross per 24 hour   Intake              320 ml   Output                0 ml   Net              320 ml     EXAM:   GENERAL: chronically ill appearing in no distress, awake, alert HEENT:  PERRL, EOMI, no alar flaring or epistaxis, oral mucosa moist without cyanosis, NECK:  no jugular vein distention, no retractions, no thyromegaly or masses, LUNGS: diminished, no w/r/r, HEART:  Regular rate and rhythm with no MGR; no edema is present, ABDOMEN:  soft with no tenderness, bowel sounds present, EXTREMITIES:  Left toe dressing c/d/i.  No edema  , SKIN:  no jaundice or ecchymosis and NEUROLOGIC:  grossly non-focal    Ever Dillon MD  [facility]

## 2017-07-17 NOTE — PROGRESS NOTES
1900 Bedside and Verbal shift change report given to [address] Mckeon (oncoming nurse) by Mimi Lewis nurse). Report included the following information SBAR, Kardex, Procedure Summary, Intake/Output, MAR, Recent Results and Cardiac Rhythm sinus rhythm. Pt son at bedside    2100 Pt son assist with pt eating    18 Pt son departs    0030 Pt c/o her feet being cold and hurting. Pt legs elevated on pillows, blanket placed over pt feet and legs. Pt indicates she feels better. 0700 Bedside and Verbal shift change report given to Kaylee West (oncoming nurse) by Jeanie N Zackery Mckeon (offgoing nurse). Report included the following information SBAR, Kardex, Intake/Output, MAR, Recent Results and Cardiac Rhythm sinus rhythm.

## 2017-07-17 NOTE — PROGRESS NOTES
Right LE arterial duplex completed. [facility] She verbalized understanding. Final results to follow.

## 2017-07-17 NOTE — PROGRESS NOTES
Daily Progress Note: 7/17/2017  Shahida Connell MD    Assessment/Plan:   Severe Sepsis / Shock / Leukocytosis - POA, ischemic colitis and DM foot ulcer/gas gangrene. Sepsis protocol. Trend lactate. Hydrate. Empiric Abx (Vanco, l, zosyn) - Vanc now DCed and Merrem on. Follow blood and stool cx. Off Anant     DM foot ulcer/Gas gangrene left toe/left hallux and 1st interspace gas gangrene - POA, continue antibiotics. Podiatry did left 1st Ray Amputation 7/13.     Diarrhea - likely from Ischemic Colitis - Immodium, Guar gum, Banatrol     FTT (failure to thrive) in adult / Lethargy / Debilitated patient / Vomiting - POA, worse than baseline due to sepsis. Fall precautions. Will need PT/OT eval when better. Palliative consulted     Chronic diastolic heart failure -   Hydrate as needed, and allow HD to handle final fluid volume.      ESRD (end stage renal disease) - Consult renal to handle HD.  Renal diet when eating. Continue sevelamer when eating.      Anemia - POA presumed related to ESRD. She takes iron, resume when eating      Coronary atherosclerosis of native coronary artery / HTN (hypertension) - No acute issues. Monitor tele. Appreciate Cardiology consult. Resume IMDUR, metoprolol and norvasc when able     Chronic respiratory failure with hypoxia / Chronic airway obstruction, not elsewhere classified/Chronic scarring RLL with chronic right pleural effusion - Per Pulmonary. Continue Advair, and prn duonebs. Oxygen as needed.      Hyperlipidemia - Continue zocor. No evidence of benefit in Pt with ESRD. I would stop it, and hold for now      Hypothyroidism - No longer listing synthroid. Check TSH.     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 Prior A1c was 5.7.       DJD / Neuropathy - Prn tylenol    Ischemic Colitis: Sigmoid done 7/13 by Dr Pina Combs - per GI.        Problem List:  Problem List as of 7/17/2017  Date Reviewed: 6/29/2015 Codes Class Noted - Resolved    Sepsis ([facility] ICD-10-CM: A41.9  ICD-9-CM: 038.9, 995.91  7/12/2017 - Present        Shock ([facility] ICD-10-CM: R57.9  ICD-9-CM: 785.50  7/12/2017 - Present        Diarrhea ICD-10-CM: R19.7  ICD-9-CM: 787.91  7/12/2017 - Present        Hypothyroidism ICD-10-CM: E03.9  ICD-9-CM: 244.9  Unknown - Present        GERD (gastroesophageal reflux disease) ICD-10-CM: K21.9  ICD-9-CM: 530.81  Unknown - Present        Chronic obstructive pulmonary disease ([facility] ICD-10-CM: J44.9  ICD-9-CM: 049  Unknown - Present    Overview Signed 7/12/2017  3:31 PM by Meg Lr MD     emphysema             DM foot ulcer ([facility] ICD-10-CM: E11.621, L97.509  ICD-9-CM: 250.80, 707.15  7/12/2017 - Present        Leukocytosis ICD-10-CM: Z39.079  ICD-9-CM: 288.60  7/12/2017 - Present        Lethargy ICD-10-CM: R53.83  ICD-9-CM: 780.79  7/12/2017 - Present        Vomiting ICD-10-CM: R11.10  ICD-9-CM: 787.03  7/12/2017 - Present        FTT (failure to thrive) in adult ICD-10-CM: R62.7  ICD-9-CM: 783.7  7/12/2017 - Present        Debilitated patient ICD-10-CM: R53.81  ICD-9-CM: 799.3  7/12/2017 - Present        Anemia ICD-10-CM: D64.9  ICD-9-CM: 285.9  6/29/2015 - Present        Left kidney mass ICD-10-CM: N28.89  ICD-9-CM: 593.9  6/29/2015 - Present        ESRD (end stage renal disease) ([facility] ICD-10-CM: N18.6  ICD-9-CM: 585.6  6/29/2015 - Present        Diastolic CHF, chronic (HCC) ICD-10-CM: I50.32  ICD-9-CM: 428.32, 428.0  Unknown - Present        Chronic respiratory failure with hypoxia (HCC) ICD-10-CM: J96.11  ICD-9-CM: 518.83, 799.02  Unknown - Present        DM type 2 causing renal disease (HCC) ICD-10-CM: E11.29  ICD-9-CM: 250.40  Unknown - Present        HTN (hypertension) ICD-10-CM: I10  ICD-9-CM: 401.9  12/27/2012 - Present        Coronary atherosclerosis of native coronary artery ICD-10-CM: I25.10  ICD-9-CM: 414.01  12/27/2012 - Present        Other and unspecified hyperlipidemia ICD-10-CM: E78.5  ICD-9-CM: 272.4  12/27/2012 - Present        RESOLVED: Hypertension ICD-10-CM: I10  ICD-9-CM: 401.9  Unknown - 7/12/2017        RESOLVED: ARF (acute renal failure) (HCC) ICD-10-CM: N17.9  ICD-9-CM: 584.9  6/29/2015 - 6/29/2015        RESOLVED: Ascites ICD-10-CM: R18.8  ICD-9-CM: 789.59  6/29/2015 - 7/12/2017        RESOLVED: Pleural effusion on right ICD-10-CM: J90  ICD-9-CM: 511.9  6/29/2015 - 7/12/2017        RESOLVED: Cystitis ICD-10-CM: N30.90  ICD-9-CM: 595.9  6/29/2015 - 7/12/2017        RESOLVED: Abdominal pain ICD-10-CM: R10.9  ICD-9-CM: 789.00  6/29/2015 - 7/12/2017        RESOLVED: CAD (coronary artery disease) ICD-10-CM: I25.10  ICD-9-CM: 414.00  Unknown - 7/12/2017        RESOLVED: Chronic diastolic heart failure ([facility] ICD-10-CM: I50.32  ICD-9-CM: 428.32  5/31/2013 - 7/12/2017        RESOLVED: Altered mental status ICD-10-CM: R41.82  ICD-9-CM: 780.97  1/3/2013 - 6/29/2015        RESOLVED: DM (diabetes mellitus) ([facility] ICD-10-CM: E11.9  ICD-9-CM: 250.00  12/27/2012 - 6/29/2015        RESOLVED: Chronic airway obstruction, not elsewhere classified ICD-10-CM: J44.9  ICD-9-CM: 541  12/27/2012 - 7/12/2017        RESOLVED: Tobacco abuse ICD-10-CM: Z72.0  ICD-9-CM: 305.1  12/27/2012 - 7/12/2017              Subjective:    71 y.o.  female who presented to the Emergency Department complaining of fatigue and diarrhea. She provides minimal hx. Per family, diarrhea for >4weeks. Lower abd pain for 2 days, with bilious vomiting last night  L great toe/Foot infection, not improving on Bactrim x3 weeks. Tolerating HD up to yesterday, but today in septic shock. Falling at home. We will admit her for management (dr Ramon Delgado)    7/13: She reports she is feeling some better. Little pain at this time. Underwent left 1st ray amputation today by podiatry for gas gangrene. Sigmoid by Dr Naz Wylie on 7/13 revealed ischemic colitis. Requiring may for BP support. 7/14:  Reports a little foot pain.   No further diarrhea she reports. No ab pain. Still on Anant but weaning. Planned angio LEs today per vascular surg. 7/15: Reports some pain in her foot. She is still on Anant to keep SBP >90. Nurses report no pulse palpable in her right foot and foot is cool to touch. Palliative care was consulted. Had angioplasty of left iliac yesterday. : Nurses report she pulled her central line out last night. More confused at night. Was not able to complete dialysis due to clotting. BP has been ok off Anant.     :  Remains confused. Still having loose stools - C diff neg. Attempting dialysis again today. Vanc DCed and Merrem on. Review of Systems:   A comprehensive review of systems was negative except for that written in the HPI. Objective:   Physical Exam:     Visit Vitals    /46    Pulse 72    Temp 98.1 °F (36.7 °C)    Resp 18    Ht 5' 6\" (1.676 m)    Wt 71.3 kg (157 lb 3 oz)    SpO2 100%    BMI 25.37 kg/m2    O2 Flow Rate (L/min): 4 l/min O2 Device: Nasal cannula    Temp (24hrs), Av.2 °F (36.2 °C), Min:96.9 °F (36.1 °C), Max:98.1 °F (36.7 °C)        07/15 1901 -  0700  In: 1076.3 [P.O.:560; I.V.:516.3]  Out: 0     General:  Less Alert today, cooperative, no distress, appears stated age. Head:  Normocephalic, without obvious abnormality, atraumatic. Eyes:  Conjunctivae/corneas clear. EOMs intact. Throat: Lips, mucosa, and tongue moist..   Neck: Supple, symmetrical, trachea midline, no adenopathy, thyroid: no enlargement/tenderness/nodules, no carotid bruit and no JVD. Lungs:   Scattered rhonchi but otherwise clear to auscultation bilaterally. Heart:  Regular rate and rhythm, S1, S2 normal, no murmur, click, rub or gallop. Abdomen:   Soft, non-tender. Bowel sounds normal. No masses,  No organomegaly. Extremities: no cyanosis or edema. No calf tenderness or cords. There is a dialysis shunt in the L-upper arm. Dressing dry over left foot.    Pulses: No pulses palpable in the LEs   Skin:  turgor normal.    Neurologic:  Alert and oriented X 1-2. No cogwheeling or rigidity. Gait not tested at this time. Gross motor of extremities normal.       Data Review:       Recent Days:  Recent Labs      07/17/17 0324  07/16/17   0359  07/15/17   0240   WBC  17.3*  17.0*  16.6*   HGB  9.3*  9.8*  9.6*   HCT  27.9*  29.7*  29.0*   PLT  203  187  174     Recent Labs      07/17/17 0324  07/16/17   0359  07/15/17   0240   NA  142  140  139   K  4.0  4.5  3.8   CL  108  108  105   CO2  18*  17*  21   GLU  154*  119*  148*   BUN  47*  40*  37*   CREA  6.32*  5.62*  5.33*   CA  8.4*  8.8  8.1*   MG  2.1  1.9  1.7   ALB  2.0*  1.9*  2.1*   TBILI  0.9  1.3*  1.1*   SGOT  125*  96*  25   ALT  46  26  16       24 Hour Results:  Recent Results (from the past 24 hour(s))   GLUCOSE, POC    Collection Time: 07/16/17 12:14 PM   Result Value Ref Range    Glucose (POC) 150 (H) 65 - 100 mg/dL    Performed by Flor Bazzi    GLUCOSE, POC    Collection Time: 07/16/17  5:07 PM   Result Value Ref Range    Glucose (POC) 131 (H) 65 - 100 mg/dL    Performed by Flor Bazzi    GLUCOSE, POC    Collection Time: 07/16/17  8:07 PM   Result Value Ref Range    Glucose (POC) 169 (H) 65 - 100 mg/dL    Performed by [facility]    CBC WITH AUTOMATED DIFF    Collection Time: 07/17/17  3:24 AM   Result Value Ref Range    WBC 17.3 (H) 3.6 - 11.0 K/uL    RBC 3.16 (L) 3.80 - 5.20 M/uL    HGB 9.3 (L) 11.5 - 16.0 g/dL    HCT 27.9 (L) 35.0 - 47.0 %    MCV 88.3 80.0 - 99.0 FL    MCH 29.4 26.0 - 34.0 PG    MCHC 33.3 30.0 - 36.5 g/dL    RDW 18.3 (H) 11.5 - 14.5 %    PLATELET 155 889 - 624 K/uL    NEUTROPHILS 89 (H) 32 - 75 %    BAND NEUTROPHILS 1 0 - 6 %    LYMPHOCYTES 4 (L) 12 - 49 %    MONOCYTES 6 5 - 13 %    EOSINOPHILS 0 0 - 7 %    BASOPHILS 0 0 - 1 %    ABS. NEUTROPHILS 15.6 (H) 1.8 - 8.0 K/UL    ABS. LYMPHOCYTES 0.7 (L) 0.8 - 3.5 K/UL    ABS. MONOCYTES 1.0 0.0 - 1.0 K/UL    ABS. EOSINOPHILS 0.0 0.0 - 0.4 K/UL    ABS.  BASOPHILS 0.0 0.0 - 0.1 K/UL    DF MANUAL      RBC COMMENTS ANISOCYTOSIS  1+        RBC COMMENTS RBC FRAGMENTS  LOREN CELLS       METABOLIC PANEL, COMPREHENSIVE    Collection Time: 07/17/17  3:24 AM   Result Value Ref Range    Sodium 142 136 - 145 mmol/L    Potassium 4.0 3.5 - 5.1 mmol/L    Chloride 108 97 - 108 mmol/L    CO2 18 (L) 21 - 32 mmol/L    Anion gap 16 (H) 5 - 15 mmol/L    Glucose 154 (H) 65 - 100 mg/dL    BUN 47 (H) 6 - 20 MG/DL    Creatinine 6.32 (H) 0.55 - 1.02 MG/DL    BUN/Creatinine ratio 7 (L) 12 - 20      GFR est AA 8 (L) >60 ml/min/1.73m2    GFR est non-AA 7 (L) >60 ml/min/1.73m2    Calcium 8.4 (L) 8.5 - 10.1 MG/DL    Bilirubin, total 0.9 0.2 - 1.0 MG/DL    ALT (SGPT) 46 12 - 78 U/L    AST (SGOT) 125 (H) 15 - 37 U/L    Alk.  phosphatase 164 (H) 45 - 117 U/L    Protein, total 6.8 6.4 - 8.2 g/dL    Albumin 2.0 (L) 3.5 - 5.0 g/dL    Globulin 4.8 (H) 2.0 - 4.0 g/dL    A-G Ratio 0.4 (L) 1.1 - 2.2     MAGNESIUM    Collection Time: 07/17/17  3:24 AM   Result Value Ref Range    Magnesium 2.1 1.6 - 2.4 mg/dL   NUCLEATED RBC    Collection Time: 07/17/17  3:24 AM   Result Value Ref Range    NRBC 1.8 (H) 0  WBC    ABSOLUTE NRBC 0.31 (H) 0.00 - 0.01 K/uL    WBC CORRECTED FOR NR ADJUSTED FOR NUCLEATED RBC'S     GLUCOSE, POC    Collection Time: 07/17/17  8:19 AM   Result Value Ref Range    Glucose (POC) 128 (H) 65 - 100 mg/dL    Performed by Daine Rinne        Medications reviewed  Current Facility-Administered Medications   Medication Dose Route Frequency    loperamide (IMODIUM) 1 mg/5 mL oral solution 2 mg  2 mg Oral QID PRN    QUEtiapine (SEROquel) tablet 12.5 mg  12.5 mg Oral BID    insulin lispro (HUMALOG) injection   SubCUTAneous AC&HS    guar gum (BENEFIBER) packet 1 Packet  4 g Oral TID    meropenem (MERREM) 500 mg in 0.9% sodium chloride (MBP/ADV) 50 mL  500 mg IntraVENous Q24H    famotidine (PF) (PEPCID) 20 mg in sodium chloride 0.9 % 10 mL injection  20 mg IntraVENous DIALYSIS TUE, THU & SAT    sevelamer (RENAGEL) tablet 1,600 mg  1,600 mg Oral TID WITH MEALS    sevelamer (RENAGEL) tablet 800 mg  800 mg Oral BID PRN    sodium chloride (NS) flush 5-10 mL  5-10 mL IntraVENous PRN    albuterol-ipratropium (DUO-NEB) 2.5 MG-0.5 MG/3 ML  3 mL Nebulization Q6H PRN    glucose chewable tablet 16 g  4 Tab Oral PRN    dextrose (D50W) injection syrg 12.5-25 g  12.5-25 g IntraVENous PRN    glucagon (GLUCAGEN) injection 1 mg  1 mg IntraMUSCular PRN    acetaminophen (TYLENOL) tablet 650 mg  650 mg Oral Q4H PRN    diphenhydrAMINE (BENADRYL) capsule 25 mg  25 mg Oral Q4H PRN    ondansetron (ZOFRAN) injection 4 mg  4 mg IntraVENous Q4H PRN    fluticasone-vilanterol (BREO ELLIPTA) 100mcg-25mcg/puff  1 Puff Inhalation DAILY    sodium hypochlorite (HALF STRENGTH DAKIN'S) 0.25% irrigation (bottle)   Topical DAILY    PHENYLephrine (NEOSYNEPHRINE) 30,000 mcg in 0.9% sodium chloride 250 mL infusion   mcg/min IntraVENous TITRATE    0.9% sodium chloride infusion 250 mL  250 mL IntraVENous PRN       Care Plan discussed with: Patient and Nurse and Pulm  Total time spent with patient: 30 minutes.     Cora Urbina MD

## 2017-07-17 NOTE — PROGRESS NOTES
Shift Summary    0700: Report received and care assumed. Pt restless in bed with VSS. 1130: Louis Womack RN at bedside. Pt starting dialysis now. 1200: VSS. Pt is being dialyzed now. 1300: Pt reassessed. Right femoral pulse present, but not strong by doppler. No palpable. Popliteal, post-tibial, and pedal pulses not palpable and not present with doppler. Pt cannot move the right leg now but is moving the left leg. Pt was able to move the right leg this morning when helping RN to turn her. Dr. Louise Solares called and made aware. 1430: Dr. Louise Solares at bedside assessing pt and speaking with pt's son. Pt is not a candidate for surgery at this time. Son aware. Per Dr. Louise Solares, pt to remain in ICU overnight. 1515: Vascular at bedside performing duplex and FELICIA now. Dialysis completed. 1700: Pt leaking stool around flexi-seal. Stool is becoming less liquid now with the banatrol. Flexiseal deflated and reinflated with 40 mL water. Zinc paste applied to buttocks and linens changed. 1840: At change of shift, VSS. No major changes since 1600.

## 2017-07-17 NOTE — PROGRESS NOTES
Problem: Falls - Risk of  Goal: *Absence of falls  Outcome: Progressing Towards Goal  Pt does not try to get OOB without assistance. Bed alarm on. Problem: Pressure Injury - Risk of  Goal: *Prevention of pressure ulcer  Outcome: Progressing Towards Goal  No evidence of pressure ulcer. Zinc paste applied to buttocks to prevent denuded skin at flexi-seal site.

## 2017-07-17 NOTE — PROGRESS NOTES
Podiatric Surgery - Progress Note    Assessment/Plan: gas gangrene LT medial foot, S/P LT guillotine 1st ray amputation, POD #4 (DOS: 7/13/17)  - Pt evaluated and tx.  - Bone exposed is necrotic, surgical skin edges necrotic, poor bleeding.  - Per Dr. Martinez Adjutant, angioplasty of left Iliac artery performed. If wound doesn't heal, she would need a fem pop bypass for revascularization. Patient may or may not be candidate for further revasc.   - will await patient medical stability prior to any further intervention. - DSG change with BW2D, can apply Dakins W2D when it arrives. - Abx per primary team  - Will monitor. Subjective:  Pt seen in ICU. Patient is delirius. Unable to communicate with patient. HPI:  Pt had been following with vascular sx / Dr. Isidro Burns for PAD, who had noted she likely needed vascular intervention and potentially amputation of her LT 1st ray.      History:  Sepsis ([address]  Gas Gangrene Left Foot  GI bleed  No Known Allergies  Family History   Problem Relation Age of Onset    Heart Disease Mother     Stroke Mother       Past Medical History:   Diagnosis Date    CAD (coronary artery disease)     Chronic obstructive pulmonary disease ([address]     emphysema    Chronic respiratory failure with hypoxia (HCC)     Diastolic CHF, chronic ([address]     DM type 2 causing renal disease (HCC)     ESRD (end stage renal disease) (HCC)     GERD (gastroesophageal reflux disease)     Hypertension     Hypothyroidism      Past Surgical History:   Procedure Laterality Date    COLONOSCOPY N/A 7/12/2017    COLONOSCOPY performed by Perico Cardenas MD at [facility] ENDOSCOPY    HX AORTIC VALVE REPLACEMENT      HX CORONARY ARTERY BYPASS GRAFT      HX TUBAL LIGATION       Social History   Substance Use Topics    Smoking status: Former Smoker     Packs/day: 0.50     Quit date: 4/29/2015    Smokeless tobacco: Never Used    Alcohol use No       History   Alcohol Use No     History   Drug Use No      History   Smoking Status    Former Smoker    Packs/day: 0.50    Quit date: 4/29/2015   Smokeless Tobacco    Never Used     Current Facility-Administered Medications   Medication Dose Route Frequency    loperamide (IMODIUM) 1 mg/5 mL oral solution 2 mg  2 mg Oral QID PRN    [START ON 7/18/2017] famotidine (PEPCID) tablet 20 mg  20 mg Oral DIALYSIS TUE, THU & SAT    QUEtiapine (SEROquel) tablet 12.5 mg  12.5 mg Oral BID    insulin lispro (HUMALOG) injection   SubCUTAneous AC&HS    guar gum (BENEFIBER) packet 1 Packet  4 g Oral TID    meropenem (MERREM) 500 mg in 0.9% sodium chloride (MBP/ADV) 50 mL  500 mg IntraVENous Q24H    sevelamer (RENAGEL) tablet 1,600 mg  1,600 mg Oral TID WITH MEALS    sevelamer (RENAGEL) tablet 800 mg  800 mg Oral BID PRN    sodium chloride (NS) flush 5-10 mL  5-10 mL IntraVENous PRN    albuterol-ipratropium (DUO-NEB) 2.5 MG-0.5 MG/3 ML  3 mL Nebulization Q6H PRN    glucose chewable tablet 16 g  4 Tab Oral PRN    dextrose (D50W) injection syrg 12.5-25 g  12.5-25 g IntraVENous PRN    glucagon (GLUCAGEN) injection 1 mg  1 mg IntraMUSCular PRN    acetaminophen (TYLENOL) tablet 650 mg  650 mg Oral Q4H PRN    diphenhydrAMINE (BENADRYL) capsule 25 mg  25 mg Oral Q4H PRN    ondansetron (ZOFRAN) injection 4 mg  4 mg IntraVENous Q4H PRN    fluticasone-vilanterol (BREO ELLIPTA) 100mcg-25mcg/puff  1 Puff Inhalation DAILY    sodium hypochlorite (HALF STRENGTH DAKIN'S) 0.25% irrigation (bottle)   Topical DAILY    PHENYLephrine (NEOSYNEPHRINE) 30,000 mcg in 0.9% sodium chloride 250 mL infusion   mcg/min IntraVENous TITRATE    0.9% sodium chloride infusion 250 mL  250 mL IntraVENous PRN        Objective:  Vitals:   Patient Vitals for the past 12 hrs:   BP Temp Pulse Resp SpO2 Weight   07/17/17 0900 117/42 - 72 16 99 % -   07/17/17 0826 111/46 - 72 - - -   07/17/17 0800 121/40 97.8 °F (36.6 °C) 68 18 92 % -   07/17/17 0700 134/48 - 73 20 91 % -   07/17/17 0630 127/58 - 69 18 100 % -   07/17/17 0600 126/55 - 70 19 - -   07/17/17 0530 126/56 - 69 20 - -   07/17/17 0500 127/51 - 70 18 - -   07/17/17 0430 116/53 - 70 21 - -   07/17/17 0400 124/49 98.1 °F (36.7 °C) 75 18 100 % 71.3 kg (157 lb 3 oz)   07/17/17 0330 114/54 - 78 19 - -   07/17/17 0300 113/51 - 77 17 - -   07/17/17 0230 123/48 - 66 17 - -   07/17/17 0200 114/47 - 73 18 - -   07/17/17 0130 123/49 - 69 18 - -   07/17/17 0127 - - 68 - - -   07/17/17 0100 117/49 - 68 17 - -   07/17/17 0030 114/44 - 67 18 - -   07/17/17 0000 110/45 - 69 17 - -   07/16/17 2330 114/41 - 70 18 - -   07/16/17 2300 108/46 96.9 °F (36.1 °C) 69 18 99 % -   07/16/17 2200 108/45 - 67 22 - -       Vascular:  B/L LE  DP 0/4; PT 0/4  capillary fill time brisk, pitting edema is present, skin temperature is cool, varicosities are present. Dermatological:  Nails are thickened, elongated, discolored, painful to palpation, 3 mm thick, with subungual debris. Skin is dry and scaly, exhibits hemosiderin deposition. Skin cracks present at heels b/l. There is no maceration of the interspaces of the feet b/l. Wound: 1  Location: over LT 1st ray amputation site  Margins: surgical  Drainage: minimal sanguinous  Odor: mild  Wound base: 30% necrotic, 40% fibrotic,30% granular   Lymphangitic streaking? No.  Undermining? no  Sinus tracts? No.  Exposed bone? yes. Subcutaneous crepitation on palpation? No.    Neurological:  DTR are present, protective sensation per 5.07 [place] Kenan monofilament is absent, patient is AAOx3, mood is normal. Epicritic sensation is intact. Orthopedic:  B/L LE are assymmetric, ROM of ankle, STJ, 1st MTPJ is limited, MMT 5 out of 5 for B/L LE.  LT 1st ray amputation. Constitutional: Pt is a well developed thin elderly AAF. Lymphatics: negative tenderness to palpation of neck/axillary/inguinal nodes.     Imaging / Cx / Px:  LT foot XR: IMPRESSION: Status post amputation of the first ray to the first metatarsal base  with packing material in place.     Labs:  Recent Labs      07/17/17   0324   WBC  17.3*   CREA  6.32*   BUN  47*   GLU  154*   HGB  9.3*   HCT  27.9*   NA  142   K  4.0   CL  108   CO2  18*

## 2017-07-17 NOTE — PROGRESS NOTES
2300 Pt received laying in bed. Sleeping at this time. No signs of pain. LLE dressing clean, dry and intact. LUE has a fistula. Positive bruit. FMS present. No stool present in device collection bag. Pt is anuric per report. #20 gauge iv present in the RUE patent. 7503 pt continues to rest quietly. No signs of distress    0400 labs drawn. Pt bathed. Pt noted to have some redness to the buttocks and groin areas. Both areas appear most. Pt thoroughly cleansed. Linens changed. 0600 pt is resting. No signs of distress. Patient repositioning independently in bed.    0714 Pt care endorsed to dayshift RN.

## 2017-07-17 NOTE — PROGRESS NOTES
Vascular  On HD via her fistula in the left upper arm  She had a left iliac angioplasty as a percutaneous procedure to help \"save\" her left foot as she is not a candidate for opern surgical revascularization. Now she has no palpable right femoral pulse and cannot move her leg over the last 6 hours. Her compartment is soft. Will get some vascular studies but after reading the chart the patient is not a candidate for thrombectomy or thrombolysis. The situation and the difficult decision of left vs limb was discussed with the son who seems to understand.

## 2017-07-17 NOTE — DIALYSIS
[facility]                         [phone]  Vitals Pre Post Assessment Pre Post   /50 114/49 LOC A&OX1 self only No change   HR 68 75 Lungs clear No change   Temp 97.8 98.4 Cardiac monitored No change   Resp 16 15 Skin intact No change   Weight 71.2kg -1kg Edema none No change      Pain 0 none noted No change     Orders   Duration: Start: 1130 End: 1430 Total: 3hrs   Dialyzer: revaclear   K Bath: 3   Ca Bath: 2.5   Na / Bicarb: Target Fluid Removal: 1500ml     Access   Type & Location: LAVF +bruit/ thrill cannulated with 15g needles no issues   Comments:                                 Labs   Hep B status / date: >150 3/09/17   Obtained/Reviewed  Critical Results Called   reviewed     Meds Given   Name Dose Route   none                 Total Liters Process: 65. 0LP   Net Fluid Removed: TFR 1500ml NET 1000ml      Comments   Time Out Done: Safety check completed   Primary Nurse Rpt Pre: Fabrizio Monsivais RN   Primary Nurse Rpt Post: Magui Ontiveros RN   Pt Education: Inappropriate at this time    Care Plan: Continue HD as ordered   Tx Summary: Tx completed all blood rinsed back, Needles removed. DRSG applied no bleeding noted. Report to nurse. No plans for dc at this time.

## 2017-07-18 NOTE — PROGRESS NOTES
Pt had a right femoral thrombectomy today. I will continue to follow pt regarding discharge needs,. When pt is more stable ,please consider PT and OT consults.   Cesar Cruz

## 2017-07-18 NOTE — PROGRESS NOTES
Daily Progress Note: 7/18/2017  Delores Barboza MD    Assessment/Plan:   Severe Sepsis / Shock / Leukocytosis - POA, ischemic colitis and DM foot ulcer/gas gangrene/vascular gangrene. Sepsis protocol. Empiric Abx (Vanco, l, zosyn) - Vanc now DCed and Merrem on but some staph on culture and pharm advised G+ coverage - Doxy added 7/18. Off Anant     DM foot ulcer/Gas gangrene left toe/left hallux and 1st interspace gas gangrene/vascular gangrrene - POA. continue antibiotics. Podiatry did left 1st Ray Amputation 7/13. Vas Surg, Nathan Mckeon, did Right Fem thrombectomy and endarterectomy AM 7/18.      Diarrhea - likely from Ischemic Colitis - Immodium, Guar gum, Banatrol     FTT (failure to thrive) in adult / Lethargy / Debilitated patient / Vomiting - POA, worse than baseline due to sepsis. Fall precautions. Will need PT/OT eval when better. Palliative consulted     Chronic diastolic heart failure -   Hydrate as needed, and allow HD to handle final fluid volume.      ESRD (end stage renal disease) - Consult renal to handle HD.  Renal diet when eating. Continue sevelamer when eating.      Anemia - POA presumed related to ESRD. She takes iron, resume when eating      Coronary atherosclerosis of native coronary artery / HTN (hypertension) - No acute issues. Monitor tele. Appreciate Cardiology consult. Resume IMDUR, metoprolol and norvasc when able     Chronic respiratory failure with hypoxia / Chronic airway obstruction, not elsewhere classified/Chronic scarring RLL with chronic right pleural effusion - Per Pulmonary. Continue Advair, and prn duonebs. Oxygen as needed.      Hyperlipidemia - Continue zocor. No evidence of benefit in Pt with ESRD. I would stop it, and hold for now      Hypothyroidism - No longer listing synthroid. Check TSH.     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    Prior A1c was 5.7.       DJD / Neuropathy - Prn tylenol    Ischemic Colitis: Sigmoid done 7/13 by Dr Minerva Leal - per GI.        Problem List:  Problem List as of 7/18/2017  Date Reviewed: 6/29/2015          Codes Class Noted - Resolved    Sepsis ([facility] ICD-10-CM: A41.9  ICD-9-CM: 038.9, 995.91  7/12/2017 - Present        Shock ([facility] ICD-10-CM: R57.9  ICD-9-CM: 785.50  7/12/2017 - Present        Diarrhea ICD-10-CM: R19.7  ICD-9-CM: 787.91  7/12/2017 - Present        Hypothyroidism ICD-10-CM: E03.9  ICD-9-CM: 244.9  Unknown - Present        GERD (gastroesophageal reflux disease) ICD-10-CM: K21.9  ICD-9-CM: 530.81  Unknown - Present        Chronic obstructive pulmonary disease ([facility] ICD-10-CM: J44.9  ICD-9-CM: 838  Unknown - Present    Overview Signed 7/12/2017  3:31 PM by Antolin Acosta MD     emphysema             DM foot ulcer ([facility] ICD-10-CM: E11.621, L97.509  ICD-9-CM: 250.80, 707.15  7/12/2017 - Present        Leukocytosis ICD-10-CM: W42.543  ICD-9-CM: 288.60  7/12/2017 - Present        Lethargy ICD-10-CM: R53.83  ICD-9-CM: 780.79  7/12/2017 - Present        Vomiting ICD-10-CM: R11.10  ICD-9-CM: 787.03  7/12/2017 - Present        FTT (failure to thrive) in adult ICD-10-CM: R62.7  ICD-9-CM: 783.7  7/12/2017 - Present        Debilitated patient ICD-10-CM: R53.81  ICD-9-CM: 799.3  7/12/2017 - Present        Anemia ICD-10-CM: D64.9  ICD-9-CM: 285.9  6/29/2015 - Present        Left kidney mass ICD-10-CM: N28.89  ICD-9-CM: 593.9  6/29/2015 - Present        ESRD (end stage renal disease) ([facility] ICD-10-CM: N18.6  ICD-9-CM: 585.6  6/29/2015 - Present        Diastolic CHF, chronic (HCC) ICD-10-CM: I50.32  ICD-9-CM: 428.32, 428.0  Unknown - Present        Chronic respiratory failure with hypoxia (HCC) ICD-10-CM: J96.11  ICD-9-CM: 518.83, 799.02  Unknown - Present        DM type 2 causing renal disease (HCC) ICD-10-CM: E11.29  ICD-9-CM: 250.40  Unknown - Present        HTN (hypertension) ICD-10-CM: I10  ICD-9-CM: 401.9  12/27/2012 - Present        Coronary atherosclerosis of native coronary artery ICD-10-CM: I25.10  ICD-9-CM: 414.01  12/27/2012 - Present        Other and unspecified hyperlipidemia ICD-10-CM: E78.5  ICD-9-CM: 272.4  12/27/2012 - Present        RESOLVED: Hypertension ICD-10-CM: I10  ICD-9-CM: 401.9  Unknown - 7/12/2017        RESOLVED: ARF (acute renal failure) (HCC) ICD-10-CM: N17.9  ICD-9-CM: 584.9  6/29/2015 - 6/29/2015        RESOLVED: Ascites ICD-10-CM: R18.8  ICD-9-CM: 789.59  6/29/2015 - 7/12/2017        RESOLVED: Pleural effusion on right ICD-10-CM: J90  ICD-9-CM: 511.9  6/29/2015 - 7/12/2017        RESOLVED: Cystitis ICD-10-CM: N30.90  ICD-9-CM: 595.9  6/29/2015 - 7/12/2017        RESOLVED: Abdominal pain ICD-10-CM: R10.9  ICD-9-CM: 789.00  6/29/2015 - 7/12/2017        RESOLVED: CAD (coronary artery disease) ICD-10-CM: I25.10  ICD-9-CM: 414.00  Unknown - 7/12/2017        RESOLVED: Chronic diastolic heart failure ([facility] ICD-10-CM: I50.32  ICD-9-CM: 428.32  5/31/2013 - 7/12/2017        RESOLVED: Altered mental status ICD-10-CM: R41.82  ICD-9-CM: 780.97  1/3/2013 - 6/29/2015        RESOLVED: DM (diabetes mellitus) ([facility] ICD-10-CM: E11.9  ICD-9-CM: 250.00  12/27/2012 - 6/29/2015        RESOLVED: Chronic airway obstruction, not elsewhere classified ICD-10-CM: J44.9  ICD-9-CM: 616  12/27/2012 - 7/12/2017        RESOLVED: Tobacco abuse ICD-10-CM: Z72.0  ICD-9-CM: 305.1  12/27/2012 - 7/12/2017              Subjective:    71 y.o.  female who presented to the Emergency Department complaining of fatigue and diarrhea. She provides minimal hx. Per family, diarrhea for >4weeks. Lower abd pain for 2 days, with bilious vomiting last night  L great toe/Foot infection, not improving on Bactrim x3 weeks. Tolerating HD up to yesterday, but today in septic shock. Falling at home. We will admit her for management (dr Jorge Lira)    7/13: She reports she is feeling some better. Little pain at this time.   Underwent left 1st ray amputation today by podiatry for gas gangrene. Sigmoid by Dr Pina Combs on  revealed ischemic colitis. Requiring anant for BP support. :  Reports a little foot pain. No further diarrhea she reports. No ab pain. Still on Anant but weaning. Planned angio LEs today per vascular surg. 7/15: Reports some pain in her foot. She is still on Anant to keep SBP >90. Nurses report no pulse palpable in her right foot and foot is cool to touch. Palliative care was consulted. Had angioplasty of left iliac yesterday. : Nurses report she pulled her central line out last night. More confused at night. Was not able to complete dialysis due to clotting. BP has been ok off Anant.     :  Remains confused. Still having loose stools - C diff neg. Attempting dialysis again today. Vanc DCed and Merrem on.     :  Confused off and on. Loose stools somewhat better per pt. Completed HD yesterday and planned again tomorrow. Now on Doxy and Merrem. Review of Systems:   A comprehensive review of systems was negative except for that written in the HPI. Objective:   Physical Exam:     Visit Vitals    /65    Pulse 95    Temp 97.6 °F (36.4 °C)    Resp 19    Ht 5' 6\" (1.676 m)    Wt 70.6 kg (155 lb 10.3 oz)    SpO2 99%    BMI 25.12 kg/m2    O2 Flow Rate (L/min): 2 l/min O2 Device: Nasal cannula    Temp (24hrs), Av.2 °F (36.8 °C), Min:97.6 °F (36.4 °C), Max:98.8 °F (37.1 °C)    701 - 1900  In: 2200 [I.V.:2200]  Out: 450    1901 -  0700  In: 160 [P.O.:60; I.V.:100]  Out: 250 [Drains:250]    General:  Less Alert today, cooperative, no distress, appears stated age. Head:  Normocephalic, without obvious abnormality, atraumatic. Eyes:  Conjunctivae/corneas clear. EOMs intact. Throat: Lips, mucosa, and tongue moist..   Neck: Supple, symmetrical, trachea midline, no adenopathy, thyroid: no enlargement/tenderness/nodules, no carotid bruit and no JVD.    Lungs:   Scattered rhonchi but otherwise clear to auscultation bilaterally. Heart:  Regular rate and rhythm, S1, S2 normal, no murmur, click, rub or gallop. Abdomen:   Soft, non-tender. Bowel sounds normal. No masses,  No organomegaly. Extremities: no cyanosis or edema. No calf tenderness or cords. There is a dialysis shunt in the L-upper arm. Dressing dry over left foot. Pulses: No pulses palpable in the LEs   Skin:  turgor normal.    Neurologic:  Alert and oriented X 1-2. No cogwheeling or rigidity. Gait not tested at this time.     Gross motor of extremities normal.       Data Review:       Recent Days:  Recent Labs      07/18/17   1225  07/18/17   0402  07/17/17   0324   WBC  13.6*  13.4*  17.3*   HGB  8.6*  9.5*  9.3*   HCT  26.4*  28.1*  27.9*   PLT  197  194  203     Recent Labs      07/18/17   1225  07/18/17   0402  07/17/17   0324  07/16/17   0359   NA  138  135*  142  140   K  4.0  5.0  4.0  4.5   CL  105  105  108  108   CO2  18*  19*  18*  17*   GLU  103*  101*  154*  119*   BUN  35*  36*  47*  40*   CREA  5.50*  5.39*  6.32*  5.62*   CA  8.6  8.5  8.4*  8.8   MG   --   2.0  2.1  1.9   ALB   --   1.9*  2.0*  1.9*   TBILI   --   1.1*  0.9  1.3*   SGOT   --   147*  125*  96*   ALT   --   46  46  26       24 Hour Results:  Recent Results (from the past 24 hour(s))   GLUCOSE, POC    Collection Time: 07/17/17  4:51 PM   Result Value Ref Range    Glucose (POC) 117 (H) 65 - 100 mg/dL    Performed by [address] [place] [address], POC    Collection Time: 07/17/17 10:31 PM   Result Value Ref Range    Glucose (POC) 106 (H) 65 - 100 mg/dL    Performed by Angelica Chowdhury    CBC WITH MANUAL DIFF    Collection Time: 07/18/17  4:02 AM   Result Value Ref Range    WBC 13.4 (H) 3.6 - 11.0 K/uL    RBC 3.18 (L) 3.80 - 5.20 M/uL    HGB 9.5 (L) 11.5 - 16.0 g/dL    HCT 28.1 (L) 35.0 - 47.0 %    MCV 88.4 80.0 - 99.0 FL    MCH 29.9 26.0 - 34.0 PG    MCHC 33.8 30.0 - 36.5 g/dL    RDW 18.9 (H) 11.5 - 14.5 %    PLATELET 934 944 - 472 K/uL    NEUTROPHILS 84 (H) 32 - 75 % BAND NEUTROPHILS 0 0 - 6 %    LYMPHOCYTES 4 (L) 12 - 49 %    MONOCYTES 9 5 - 13 %    EOSINOPHILS 3 0 - 7 %    BASOPHILS 0 0 - 1 %    METAMYELOCYTES 0 0 %    MYELOCYTES 0 0 %    PROMYELOCYTES 0 0 %    BLASTS 0 0 %    OTHER CELL 0 0      ABS. NEUTROPHILS 11.3 (H) 1.8 - 8.0 K/UL    ABS. LYMPHOCYTES 0.5 (L) 0.8 - 3.5 K/UL    ABS. MONOCYTES 1.2 (H) 0.0 - 1.0 K/UL    ABS. EOSINOPHILS 0.4 0.0 - 0.4 K/UL    ABS. BASOPHILS 0.0 0.0 - 0.1 K/UL    DF MANUAL      RBC COMMENTS ANISOCYTOSIS  1+        RBC COMMENTS TARGET CELLS  PRESENT        NRBC 2.4 (H) 0  WBC    ABSOLUTE NRBC 0.32 (H) 0.00 - 0.01 K/uL    WBC CORRECTED FOR NR ADJUSTED FOR NUCLEATED RBC'S      DIFFERENTIAL MANUAL DIFFERENTIAL ORDERED     METABOLIC PANEL, COMPREHENSIVE    Collection Time: 07/18/17  4:02 AM   Result Value Ref Range    Sodium 135 (L) 136 - 145 mmol/L    Potassium 5.0 3.5 - 5.1 mmol/L    Chloride 105 97 - 108 mmol/L    CO2 19 (L) 21 - 32 mmol/L    Anion gap 11 5 - 15 mmol/L    Glucose 101 (H) 65 - 100 mg/dL    BUN 36 (H) 6 - 20 MG/DL    Creatinine 5.39 (H) 0.55 - 1.02 MG/DL    BUN/Creatinine ratio 7 (L) 12 - 20      GFR est AA 9 (L) >60 ml/min/1.73m2    GFR est non-AA 8 (L) >60 ml/min/1.73m2    Calcium 8.5 8.5 - 10.1 MG/DL    Bilirubin, total 1.1 (H) 0.2 - 1.0 MG/DL    ALT (SGPT) 46 12 - 78 U/L    AST (SGOT) 147 (H) 15 - 37 U/L    Alk.  phosphatase 170 (H) 45 - 117 U/L    Protein, total 6.9 6.4 - 8.2 g/dL    Albumin 1.9 (L) 3.5 - 5.0 g/dL    Globulin 5.0 (H) 2.0 - 4.0 g/dL    A-G Ratio 0.4 (L) 1.1 - 2.2     MAGNESIUM    Collection Time: 07/18/17  4:02 AM   Result Value Ref Range    Magnesium 2.0 1.6 - 2.4 mg/dL   GLUCOSE, POC    Collection Time: 07/18/17  7:49 AM   Result Value Ref Range    Glucose (POC) 83 65 - 100 mg/dL    Performed by Parag Snyder, POC    Collection Time: 07/18/17 12:06 PM   Result Value Ref Range    Glucose (POC) 99 65 - 100 mg/dL    Performed by Dante Villagran    CBC W/O DIFF    Collection Time: 07/18/17 12:25 PM   Result Value Ref Range    WBC 13.6 (H) 3.6 - 11.0 K/uL    RBC 2.92 (L) 3.80 - 5.20 M/uL    HGB 8.6 (L) 11.5 - 16.0 g/dL    HCT 26.4 (L) 35.0 - 47.0 %    MCV 90.4 80.0 - 99.0 FL    MCH 29.5 26.0 - 34.0 PG    MCHC 32.6 30.0 - 36.5 g/dL    RDW 19.5 (H) 11.5 - 14.5 %    PLATELET 015 992 - 042 K/uL   METABOLIC PANEL, BASIC    Collection Time: 07/18/17 12:25 PM   Result Value Ref Range    Sodium 138 136 - 145 mmol/L    Potassium 4.0 3.5 - 5.1 mmol/L    Chloride 105 97 - 108 mmol/L    CO2 18 (L) 21 - 32 mmol/L    Anion gap 15 5 - 15 mmol/L    Glucose 103 (H) 65 - 100 mg/dL    BUN 35 (H) 6 - 20 MG/DL    Creatinine 5.50 (H) 0.55 - 1.02 MG/DL    BUN/Creatinine ratio 6 (L) 12 - 20      GFR est AA 9 (L) >60 ml/min/1.73m2    GFR est non-AA 8 (L) >60 ml/min/1.73m2    Calcium 8.6 8.5 - 10.1 MG/DL   GLUCOSE, POC    Collection Time: 07/18/17  1:31 PM   Result Value Ref Range    Glucose (POC) 91 65 - 100 mg/dL    Performed by Noel Jenkins        Medications reviewed  Current Facility-Administered Medications   Medication Dose Route Frequency    albuterol (PROVENTIL HFA, VENTOLIN HFA, PROAIR HFA) inhaler 2 Puff  2 Puff Inhalation Q4H PRN    [START ON 7/19/2017] ferrous sulfate tablet 325 mg  325 mg Oral DAILY    pantoprazole (PROTONIX) tablet 40 mg  40 mg Oral BID    simvastatin (ZOCOR) tablet 20 mg  20 mg Oral QHS    [START ON 7/19/2017] SITagliptin (JANUVIA) tablet tab 25 mg  25 mg Oral DAILY    [START ON 7/19/2017] umeclidinium (INCRUSE ELLIPTA) 62.5 mcg/actuation  1 Puff Inhalation DAILY    doxycycline (VIBRAMYCIN) 100 mg in 0.9% sodium chloride (MBP/ADV) 100 mL  100 mg IntraVENous Q12H    loperamide (IMODIUM) 1 mg/5 mL oral solution 2 mg  2 mg Oral QID PRN    famotidine (PEPCID) tablet 20 mg  20 mg Oral DIALYSIS TUE, THU & SAT    QUEtiapine (SEROquel) tablet 12.5 mg  12.5 mg Oral BID    insulin lispro (HUMALOG) injection   SubCUTAneous AC&HS    guar gum (BENEFIBER) packet 1 Packet  4 g Oral TID    meropenem (MERREM) 500 mg in 0.9% sodium chloride (MBP/ADV) 50 mL  500 mg IntraVENous Q24H    sevelamer (RENAGEL) tablet 1,600 mg  1,600 mg Oral TID WITH MEALS    sevelamer (RENAGEL) tablet 800 mg  800 mg Oral BID PRN    sodium chloride (NS) flush 5-10 mL  5-10 mL IntraVENous PRN    albuterol-ipratropium (DUO-NEB) 2.5 MG-0.5 MG/3 ML  3 mL Nebulization Q6H PRN    glucose chewable tablet 16 g  4 Tab Oral PRN    dextrose (D50W) injection syrg 12.5-25 g  12.5-25 g IntraVENous PRN    glucagon (GLUCAGEN) injection 1 mg  1 mg IntraMUSCular PRN    acetaminophen (TYLENOL) tablet 650 mg  650 mg Oral Q4H PRN    diphenhydrAMINE (BENADRYL) capsule 25 mg  25 mg Oral Q4H PRN    ondansetron (ZOFRAN) injection 4 mg  4 mg IntraVENous Q4H PRN    fluticasone-vilanterol (BREO ELLIPTA) 100mcg-25mcg/puff  1 Puff Inhalation DAILY    sodium hypochlorite (HALF STRENGTH DAKIN'S) 0.25% irrigation (bottle)   Topical DAILY       Care Plan discussed with: Patient and Nurse and Pulm  Total time spent with patient: 30 minutes.     Cristina Brown MD

## 2017-07-18 NOTE — PROGRESS NOTES
Oncoming  Shift Report - Bedside shift change report given to Oj Long RN (oncoming nurse) by Shira Oseguera RN (offgoing nurse). Report included the following information SBAR, Kardex, ED Summary, OR Summary, Intake/Output, MAR, Accordion, Recent Results, Med Rec Status and Cardiac Rhythm Sinus Arrhythmia. 0915 - Pt taken down to PACU for pre-op. 1730 - FMS removed as pt is having more formed stool, no longer liquid. Dressing changed to left foot, old dressing removed and wound flushed with NSS. Wound packed with dakins soaked gauze, covered in dry gauze and wrapped with kerlix. Off going  Shift Report - Bedside shift change report given to Anika Solares RN (oncoming nurse) by Oj Long RN (offgoing nurse). Report included the following information SBAR, Kardex, ED Summary, OR Summary, Intake/Output, MAR, Accordion, Recent Results, Med Rec Status and Cardiac Rhythm Sinus Arrhythmia.

## 2017-07-18 NOTE — ROUTINE PROCESS
TRANSFER - IN REPORT:    Verbal report received from Saul Silva, [facility] (name) on Jagruti Currie  being received from [employer]) for routine post - op      Report consisted of patients Situation, Background, Assessment and   Recommendations(SBAR). Information from the following report(s) SBAR, Kardex, ED Summary, OR Summary, Intake/Output, MAR, Accordion, Recent Results, Med Rec Status and Cardiac Rhythm Sinus Rhythm was reviewed with the receiving nurse. Opportunity for questions and clarification was provided. Assessment completed upon patients arrival to unit and care assumed.

## 2017-07-18 NOTE — ANESTHESIA POSTPROCEDURE EVALUATION
Post-Anesthesia Evaluation and Assessment    Patient: Ula Dance MRN: [id number]  SSN: [id number]    YOB: 1945  Age: 71 y.o. Sex: female       Cardiovascular Function/Vital Signs  Visit Vitals    /49    Pulse 88    Temp 36.7 °C (98 °F)    Resp 16    Ht 5' 6\" (1.676 m)    Wt 70.6 kg (155 lb 10.3 oz)    SpO2 95%    BMI 25.12 kg/m2       Patient is status post MAC anesthesia for Procedure(s):  RIGHT FEMORAL THROMBECTOMY. Nausea/Vomiting: None    Postoperative hydration reviewed and adequate. Pain:  Pain Scale 1: Numeric (0 - 10) (07/18/17 1244)  Pain Intensity 1: 0 (07/18/17 1244)   Managed    Neurological Status:   Neuro (WDL): Exceptions to WDL (07/18/17 1235)  Neuro  Neurologic State: Drowsy; Pharmacologically induced (comment) (07/18/17 1235)  Orientation Level: Oriented to person;Oriented to place (07/18/17 1006)  Cognition: Follows commands; Appropriate safety awareness (07/18/17 1006)  Speech: Clear (07/18/17 1006)  Assessment L Pupil: Brisk;Round (07/18/17 0800)  Size L Pupil (mm): 3 (07/18/17 0800)  Assessment R Pupil: Brisk;Round (07/18/17 0800)  Size R Pupil (mm): 3 (07/18/17 0800)  LUE Motor Response: Weak (07/18/17 1200)  LLE Motor Response: Weak (07/18/17 1200)  RUE Motor Response: Weak (07/18/17 1200)  RLE Motor Response: Weak (07/18/17 1200)   At baseline    Mental Status and Level of Consciousness: Arousable    Pulmonary Status:   O2 Device: Nasal cannula (07/18/17 1235)   Adequate oxygenation and airway patent    Complications related to anesthesia: None    Post-anesthesia assessment completed.  No concerns    Signed By: Girish Koch MD     July 18, 2017

## 2017-07-18 NOTE — PROGRESS NOTES
Bedside shift change report given to López Pearson (oncoming nurse) by Wendy Pittman (offgoing nurse). Report included the following information SBAR, Kardex, OR Summary, Procedure Summary and MAR.     1900:  Report received, VSS, pt in bed    2000:  Assessment complete, VSS, will continue to monitor    0000:  No changes in assessment, VSS, will continue to monitor    0400:  No changes in assessment, VSS, Will continue to monitor    Bedside shift change report given to Rose Villa (oncoming nurse) by Araceli Thomas RN (offgoing nurse). Report included the following information SBAR, Kardex, OR Summary and MAR.

## 2017-07-18 NOTE — PROGRESS NOTES
Vascular  The patient has become more stable since yesterday and can withstand a right femoral thrombectomy under local with MAC.

## 2017-07-18 NOTE — PROGRESS NOTES
Vascular  No signals in either foot  Duplex suggests patent but monophasic flow to the mid foreleg  Unable to move right foot  Foot and distal calf are cool  Will discuss with son re: observation or simple thrombectomy]  Difficult situation

## 2017-07-18 NOTE — PROGRESS NOTES
Nutrition Assessment:    RECOMMENDATIONS/INTERVENTION(S):   Advance to Mechanical Soft/Renal 80/80/80 & continue Nepro Shakes BID and Banatrol TID   Monitor BG, renal labs and need for diet/supplement adjustment  Encourage meals  Will continue to monitor appetite/PO intake, diet tolerance, and acceptance of ONS    ASSESSMENT:   7/18:  Pt currently NPO and off unit for rt femoral thrombectomy. Appetite has been poor, consuming ~ 0-10% meals. Unknown if pt drinking Nepro Shakes, consuming applesauce w/ Banatrol. Labs/Meds reviewed. C-diff negative. BG stable. LBM recorded 7/18. Energy needs remain elevated due to HD and recent surgery. 7/13:  Received [facility] referral for weight loss. Chart reviewed. 72 yo female admitted with sepsis shock, FTT, diarrhea x 4 weeks, DM foot ulcer. Hx HTN, CHF, ESRD on HD, DM, respiratory failure, GERD, CAD, hypothyroidism. Pt s/p left partial first ray amputation today. Diet advanced to Cardiac, has yet to eat lunch. Poor dentition noted. Labs/Meds reviewed. BG stable. K+ and Phos okay, Mg low end of normal.  Receiving IVF, on Anant. LBM recorded 7/12--watery, C-diff results pending. Skin--right foot incision. No recent, comparable weights. Energy elevated secondary to HD and recent surgery--RD to add ONS.       SUBJECTIVE/OBJECTIVE:     Diet Order: NPO  % Eaten:    Patient Vitals for the past 72 hrs:   % Diet Eaten   07/17/17 1300 10 %   07/17/17 1030 0 %   07/16/17 1700 5 %   07/16/17 1300 10 %   07/16/17 0800 10 %     Pertinent Medications: [] Reviewed    Chemistries:  Lab Results   Component Value Date/Time    Sodium 135 07/18/2017 04:02 AM    Potassium 5.0 07/18/2017 04:02 AM    Chloride 105 07/18/2017 04:02 AM    CO2 19 07/18/2017 04:02 AM    Anion gap 11 07/18/2017 04:02 AM    Glucose 101 07/18/2017 04:02 AM    BUN 36 07/18/2017 04:02 AM    Creatinine 5.39 07/18/2017 04:02 AM    BUN/Creatinine ratio 7 07/18/2017 04:02 AM    GFR est AA 9 07/18/2017 04:02 AM    GFR est non-AA 8 07/18/2017 04:02 AM    Calcium 8.5 07/18/2017 04:02 AM    AST (SGOT) 147 07/18/2017 04:02 AM    Alk. phosphatase 170 07/18/2017 04:02 AM    Protein, total 6.9 07/18/2017 04:02 AM    Albumin 1.9 07/18/2017 04:02 AM    Globulin 5.0 07/18/2017 04:02 AM    A-G Ratio 0.4 07/18/2017 04:02 AM    ALT (SGPT) 46 07/18/2017 04:02 AM      Anthropometrics: Height: 5' 6\" (167.6 cm) Weight: 54.4 Kg    IBW (%IBW): 59.1 kg (130 lb 4.7 oz) ( ) UBW (%UBW):   (  %)    BMI: Body mass index is 25.12 kg/(m^2). This BMI is indicative of:   [x] Underweight for advanced age    [] Normal    [] Overweight    []  Obesity    []  Extreme Obesity (BMI>40)  Estimated Nutrition Needs (Based on): 1429 Kcals/day (BMR (1099) x 1.3 AF ) , 68 g (-79 g/d (1.2-1.4 g/Kg actual body wt)) Protein  Carbohydrate: At Least 130 g/day  Fluids: 1450 mL/day    Last BM: 7/18-loose  [x]Active     []Hyperactive  []Hypoactive       [] Absent   BS  Skin:    [] Intact   [x] Incision-surgical rt foot  [] Breakdown   [] DTI   [] Tears/Excoriation/Abrasion  []Edema [] Other:    Wt Readings from Last 30 Encounters:   07/18/17 70.6 kg (155 lb 10.3 oz)   06/23/17 54.4 kg (120 lb)   07/29/15 55 kg (121 lb 4.8 oz)   07/02/15 60.2 kg (132 lb 12.8 oz)   05/31/13 65.3 kg (144 lb)   05/14/13 70.3 kg (155 lb)   12/27/12 58.8 kg (129 lb 11.2 oz)      NUTRITION DIAGNOSES:   Problem:  Increased protein needs Inadequate energy intake   Etiology: related to surgical wound, ESRD decreased appetite   Signs/Symptoms: as evidenced by s/p amputation for gangrene of the foot; on HD <50% meals consumed    NUTRITION INTERVENTIONS:  Meals/Snacks: General/healthful diet   Supplements: Commercial supplement              GOAL:   Pt will consume >50% meals/supplements, BG and electrolytes will be stable in next 3-5 days    Cultural, Scientologist, or Ethnic Dietary Needs:   None    LEARNING NEEDS (Diet, Food/Nutrient-Drug Interaction):    [x] None Identified   [] Identified and Education Provided/Documented   [] Identified and Pt declined/was not appropriate      [] Interdisciplinary Care Plan Reviewed/Documented    [x] Discharge Needs: tbd     [x] No Nutrition Related Discharge Needs    NUTRITION RISK:   Pt Is At Nutrition Risk  [x]     No Nutrition Risk Identified  []       PT SEEN FOR:    []  MD Consult: []Calorie Count      []Diabetic Diet Education        []Diet Education     []Electrolyte Management     []General Nutrition Management and Supplements     []Management of Tube Feeding     []TPN Recommendations    []  RN Referral:  []MST score >=2     []Enteral/Parenteral Nutrition PTA     []Pregnant: Gestational DM or Multigestation                 [] Pressure Ulcer      []  Low BMI      []  Length of Stay       [] Dysphagia Diet         [] Ventilator      [x]  Follow-Up      Previous Recommendations:   [x] Implemented          [] Not Implemented          [] Not Applicable    Previous Goal:   [] Met              [x] Progressing Towards Goal              [] Not Progressing Towards Goal   [] Not Applicable              Bertha Perez, [address] [place] [address]   Pager [phone]

## 2017-07-18 NOTE — BRIEF OP NOTE
BRIEF OPERATIVE NOTE    Date of Procedure: 7/18/2017   Preoperative Diagnosis: SEVERELY ISCHEMIC RIGHT LEG  Postoperative Diagnosis: SEVERELY ISCHEMIC RIGHT LEG    Procedure(s):  RIGHT FEMORAL THROMBECTOMY, RIGHT FEMORAL ENDARTERECTOMY AND DACRON PATCH  Surgeon(s) and Role:     * Brandy Marie MD - Primary         Assistant Staff:       Surgical Staff:  Circ-1: Eran Membreno RN  Circ-Relief: Kirby Beck RN  Scrub Tech-1: Ardeth Civatte  Surg Asst-1: Charisse Precise  Float Staff: Dusty Forbes RN; Darrin Snow  Event Time In   Incision Start 1027   Incision Close      Anesthesia: MAC   Estimated Blood Loss: **250mls*  Specimens:   ID Type Source Tests Collected by Time Destination   1 : Right femoral plaque Preservative Femoral, right  Brandy Marie MD 7/18/2017 1122 Pathology      Findings: *occluded distal EIA and CFA from plaque and perclose device**   Complications: **none*  Implants:   Implant Name Type Inv.  Item Serial No.  Lot No. LRB No. Used Action   [employer] platinum finesse ultra-thin knitted cardiovascular patch     [id number]   [id number] Right 1 Implanted

## 2017-07-18 NOTE — PROGRESS NOTES
Lauryn 38  YOB: 1945          Assessment & Plan:   ESRD  · HD TTS at [facility]  · HD held sat due to difficult cannulation but worked well yesterday  · May need f-gram if recurrent problems  · Next HD tomorrow    S/p R fem thrombectomy    DM foot ulcer s/p amputation    ADAM   · On sevelamer    Diarrhea/ischemic colitis    FTT/malnutrition       Subjective:   CC: f/u ESRD  HPI: Had femoral thrombectomy earlier today  ROS: Sedated following surgery  Current Facility-Administered Medications   Medication Dose Route Frequency    albuterol (PROVENTIL HFA, VENTOLIN HFA, PROAIR HFA) inhaler 2 Puff  2 Puff Inhalation Q4H PRN    [START ON 7/19/2017] ferrous sulfate tablet 325 mg  325 mg Oral DAILY    pantoprazole (PROTONIX) tablet 40 mg  40 mg Oral BID    simvastatin (ZOCOR) tablet 20 mg  20 mg Oral QHS    [START ON 7/19/2017] SITagliptin (JANUVIA) tablet tab 25 mg  25 mg Oral DAILY    [START ON 7/19/2017] umeclidinium (INCRUSE ELLIPTA) 62.5 mcg/actuation  1 Puff Inhalation DAILY    doxycycline (VIBRAMYCIN) 100 mg in 0.9% sodium chloride (MBP/ADV) 100 mL  100 mg IntraVENous Q12H    loperamide (IMODIUM) 1 mg/5 mL oral solution 2 mg  2 mg Oral QID PRN    famotidine (PEPCID) tablet 20 mg  20 mg Oral DIALYSIS TUE, THU & SAT    QUEtiapine (SEROquel) tablet 12.5 mg  12.5 mg Oral BID    insulin lispro (HUMALOG) injection   SubCUTAneous AC&HS    guar gum (BENEFIBER) packet 1 Packet  4 g Oral TID    meropenem (MERREM) 500 mg in 0.9% sodium chloride (MBP/ADV) 50 mL  500 mg IntraVENous Q24H    sevelamer (RENAGEL) tablet 1,600 mg  1,600 mg Oral TID WITH MEALS    sevelamer (RENAGEL) tablet 800 mg  800 mg Oral BID PRN    sodium chloride (NS) flush 5-10 mL  5-10 mL IntraVENous PRN    albuterol-ipratropium (DUO-NEB) 2.5 MG-0.5 MG/3 ML  3 mL Nebulization Q6H PRN    glucose chewable tablet 16 g  4 Tab Oral PRN    dextrose (D50W) injection syrg 12.5-25 g  12.5-25 g IntraVENous PRN    glucagon (GLUCAGEN) injection 1 mg  1 mg IntraMUSCular PRN    acetaminophen (TYLENOL) tablet 650 mg  650 mg Oral Q4H PRN    diphenhydrAMINE (BENADRYL) capsule 25 mg  25 mg Oral Q4H PRN    ondansetron (ZOFRAN) injection 4 mg  4 mg IntraVENous Q4H PRN    fluticasone-vilanterol (BREO ELLIPTA) 100mcg-25mcg/puff  1 Puff Inhalation DAILY    sodium hypochlorite (HALF STRENGTH DAKIN'S) 0.25% irrigation (bottle)   Topical DAILY          Objective:     Vitals:  Blood pressure 109/53, pulse 89, temperature 97.6 °F (36.4 °C), resp. rate 19, height 5' 6\" (1.676 m), weight 70.6 kg (155 lb 10.3 oz), SpO2 100 %. Temp (24hrs), Av.2 °F (36.8 °C), Min:97.6 °F (36.4 °C), Max:98.8 °F (37.1 °C)      Intake and Output:   0701 -  1900  In: 2200 [I.V.:2200]  Out: 450    190 -  0700  In: 160 [P.O.:60; I.V.:100]  Out: 250 [Drains:250]    Physical Exam:               GENERAL ASSESSMENT: Chronically ill, NAD  CHEST: CTA  HEART: S1S2 +m  ABDOMEN: Soft,NT  EXTREMITY: no EDEMA, LUE AVF +t/b  NEURO: sedated          ECG/rhythm:    Data Review      No results for input(s): TNIPOC in the last 72 hours. No lab exists for component: ITNL   No results for input(s): CPK, CKMB, TROIQ in the last 72 hours. Recent Labs      17   1225  17   0402  17   0324  17   0359   NA  138  135*  142  140   K  4.0  5.0  4.0  4.5   CL  105  105  108  108   CO2  18*  19*  18*  17*   BUN  35*  36*  47*  40*   CREA  5.50*  5.39*  6.32*  5.62*   GLU  103*  101*  154*  119*   MG   --   2.0  2.1  1.9   CA  8.6  8.5  8.4*  8.8   ALB   --   1.9*  2.0*  1.9*   WBC  13.6*  13.4*  17.3*  17.0*   HGB  8.6*  9.5*  9.3*  9.8*   HCT  26.4*  28.1*  27.9*  29.7*   PLT  197  194  203  187      No results for input(s): INR, PTP, APTT in the last 72 hours.     No lab exists for component: INREXT, INREXT  Needs: urine analysis, urine sodium, protein and creatinine  No results found for: SHAINA, CREAU      : Durga Pineda MD  7/18/2017        [facility]:  [email or web address]  Rosmery Sawyer office:  [address] [place] [address], [address] [place] [address],[address]  [place], [address] [place] [address]  Phone: [phone]  Fax :     [phone]    [facility] office:  [address] [facility], [address]   Phone - [phone]  Fax - [phone]

## 2017-07-18 NOTE — PROGRESS NOTES
[facility]     Name: Betty Khanna MRN: [id number]   : 1945 Hospital: [address]   Date: 2017        Impression Plan   1. Septic shock--off pressors. 2. 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 Severe ischemia of the right leg; s/p Right femoral Thrombectomy ()  4. ESRD - On HD. 5. Abnormal CT chest- chronic scarring RLL with chronic right pleural effusion  6. Ischemic colitis per CT  7. Diarrhea requiring rectal tube--C. Diff neg  8. GI bleed- hgb stable  9. PVD  10. Leukocytosis  11. Hx of COPD   · RRT per nephrology. Plan for HD today. · Cultures from wound reviewed. MSSA, Acinetobacter and pseudomonas. D/c vanc; continue Merrem. No need for flagyl for anerobes. C.diff negative. · Hold AC for now- restart per surgical team  · Transfusion indication 8.0 and below. · Famotidine  · Renal diet. Radiology  ( personally reviewed) CXR: right pleural effusion and scarring- stable for atleast 2 years   ABG No results for input(s): PHI, PO2I, PCO2I in the last 72 hours. Subjective     Overnight events:    Remained off pressors. HD today. S/p Right femoral thrombectomy. Past Medical History:   Diagnosis Date    CAD (coronary artery disease)     Chronic obstructive pulmonary disease (HCC)     emphysema    Chronic respiratory failure with hypoxia (HCC)     Diastolic CHF, chronic (HCC)     DM type 2 causing renal disease ([facility] [address]     ESRD (end stage renal disease) (HCC)     GERD (gastroesophageal reflux disease)     Hypertension     Hypothyroidism       Past Surgical History:   Procedure Laterality Date    COLONOSCOPY N/A 2017    COLONOSCOPY performed by Cassandra Sampson MD at [facility] ENDOSCOPY    HX AORTIC VALVE REPLACEMENT      HX CORONARY ARTERY BYPASS GRAFT      HX TUBAL LIGATION        Prior to Admission medications    Medication Sig Start Date End Date Taking?  Authorizing Provider   SITagliptin (JANUVIA) 25 mg tablet Take 25 mg by mouth daily. Yes Debo Villatoro MD   sevelamer carbonate (RENVELA) 800 mg tab tab Take 1,600 mg by mouth three (3) times daily. 1 tab bid with meals   Yes Debo Villatoro MD   simvastatin (ZOCOR) 20 mg tablet Take 20 mg by mouth nightly. Yes Historical Provider   trimethoprim-sulfamethoxazole (BACTRIM DS) 160-800 mg per tablet Take 1 Tab by mouth two (2) times a day. 6/12/17  Yes Historical Provider   albuterol (PROAIR HFA) 90 mcg/actuation inhaler Take 2 Puffs by inhalation every four (4) hours as needed for Wheezing. Yes Historical Provider   sevelamer (RENAGEL) 800 mg tablet Take 800 mg by mouth daily. With snacks   Yes Historical Provider   isosorbide mononitrate ER (IMDUR) 30 mg tablet Take 30 mg by mouth daily. Yes Debo Villatoro MD   ferrous sulfate 325 mg (65 mg iron) tablet Take 325 mg by mouth daily. Yes Historical Provider   metoprolol tartrate (LOPRESSOR) 25 mg tablet Take 12.5 mg by mouth two (2) times a day. Yes Historical Provider   tiotropium (SPIRIVA) 18 mcg inhalation capsule Take 1 Cap by inhalation daily. Yes Historical Provider   fluticasone-salmeterol (ADVAIR DISKUS) 250-50 mcg/dose diskus inhaler Take 1 Puff by inhalation every twelve (12) hours. Yes Historical Provider   pantoprazole (PROTONIX) 40 mg tablet Take 40 mg by mouth two (2) times a day. Yes Historical Provider   amLODIPine (NORVASC) 10 mg tablet Take 10 mg by mouth daily.    Yes Historical Provider     Current Facility-Administered Medications   Medication Dose Route Frequency    [START ON 7/19/2017] amLODIPine (NORVASC) tablet 10 mg  10 mg Oral DAILY    [START ON 7/19/2017] ferrous sulfate tablet 325 mg  325 mg Oral DAILY    [START ON 7/19/2017] isosorbide mononitrate ER (IMDUR) tablet 30 mg  30 mg Oral DAILY    metoprolol tartrate (LOPRESSOR) tablet 12.5 mg  12.5 mg Oral BID    pantoprazole (PROTONIX) tablet 40 mg  40 mg Oral BID    simvastatin (ZOCOR) tablet 20 mg  20 mg Oral QHS    [START ON 7/19/2017] SITagliptin (JANUVIA) tablet tab 25 mg  25 mg Oral DAILY    [START ON 7/19/2017] umeclidinium (INCRUSE ELLIPTA) 62.5 mcg/actuation  1 Puff Inhalation DAILY    trimethoprim-sulfamethoxazole (BACTRIM DS, SEPTRA DS) 160-800 mg per tablet 1 Tab  1 Tab Oral BID    famotidine (PEPCID) tablet 20 mg  20 mg Oral DIALYSIS TUE, THU & SAT    QUEtiapine (SEROquel) tablet 12.5 mg  12.5 mg Oral BID    insulin lispro (HUMALOG) injection   SubCUTAneous AC&HS    guar gum (BENEFIBER) packet 1 Packet  4 g Oral TID    meropenem (MERREM) 500 mg in 0.9% sodium chloride (MBP/ADV) 50 mL  500 mg IntraVENous Q24H    sevelamer (RENAGEL) tablet 1,600 mg  1,600 mg Oral TID WITH MEALS    fluticasone-vilanterol (BREO ELLIPTA) 100mcg-25mcg/puff  1 Puff Inhalation DAILY    sodium hypochlorite (HALF STRENGTH DAKIN'S) 0.25% irrigation (bottle)   Topical DAILY     No Known Allergies   Social History   Substance Use Topics    Smoking status: Former Smoker     Packs/day: 0.50     Quit date: 4/29/2015    Smokeless tobacco: Never Used    Alcohol use No      Family History   Problem Relation Age of Onset    Heart Disease Mother     Stroke Mother           Laboratory: I have personally reviewed the critical care flowsheet and labs.      Recent Labs      07/18/17   1225  07/18/17   0402  07/17/17   0324   WBC  13.6*  13.4*  17.3*   HGB  8.6*  9.5*  9.3*   HCT  26.4*  28.1*  27.9*   PLT  197  194  203     Recent Labs      07/18/17   1225  07/18/17   0402  07/17/17   0324  07/16/17   0359   NA  138  135*  142  140   K  4.0  5.0  4.0  4.5   CL  105  105  108  108   CO2  18*  19*  18*  17*   GLU  103*  101*  154*  119*   BUN  35*  36*  47*  40*   CREA  5.50*  5.39*  6.32*  5.62*   CA  8.6  8.5  8.4*  8.8   MG   --   2.0  2.1  1.9   ALB   --   1.9*  2.0*  1.9*   SGOT   --   147*  125*  96*   ALT   --   46  46  26       Objective:     Mode Rate Tidal Volume Pressure FiO2 PEEP                    Vital Signs:     TMAX(24)      Intake/Output: Last shift:         Last 3 shifts: 07/18 0701 - 07/18 1900  In: 2200 [I.V.:2200]  Out: 450 [id number]    Intake/Output Summary (Last 24 hours) at 07/18/17 1343  Last data filed at 07/18/17 1141   Gross per 24 hour   Intake             2300 ml   Output              550 ml   Net             1750 ml     EXAM:   GENERAL: chronically ill appearing in no distress, awake, alert HEENT:  PERRL, EOMI, no alar flaring or epistaxis, oral mucosa moist without cyanosis, NECK:  no jugular vein distention, no retractions, no thyromegaly or masses, LUNGS: diminished, no w/r/r, HEART:  Regular rate and rhythm with no MGR; no edema is present, ABDOMEN:  soft with no tenderness, bowel sounds present, EXTREMITIES:  Left toe dressing c/d/i.  No edema, SKIN:  no jaundice or ecchymosis and NEUROLOGIC:  grossly non-focal      Ben Robles MD  [facility] [address]

## 2017-07-18 NOTE — ROUTINE PROCESS
TRANSFER - OUT REPORT:    Verbal report given to Bello Gray RN(name) on [facility]  being transferred to [facility](unit) for routine post - op       Report consisted of patients Situation, Background, Assessment and   Recommendations(SBAR). Information from the following report(s) SBAR and MAR was reviewed with the receiving nurse. Opportunity for questions and clarification was provided.       Patient transported with:   Monitor  O2 @ 2 liters  Registered Nurse

## 2017-07-18 NOTE — PROGRESS NOTES
Podiatric Surgery - Progress Note    Assessment/Plan: gas gangrene LT medial foot, S/P LT guillotine 1st ray amputation, POD #5 (DOS: 7/13/17)  - Pt evaluated and tx.  - LT foot surgical site exhibits extensive necrotic skin / fat / bone, would appear the gas gangrene has been surgically managed but now vascular gangrene continues to progress despite revascularization on the LLE with Dr. Oriana Gill. Pt to OR today for RT thrombectomy today with Dr. Denis Regalado.    - Given progression of gangrene and her overall health status I am not sure the LT foot is salvageable, or if she can heal a TMA, would ask for vascular surgery input on this. Also attempted to reach out to her son to discuss long term goals of care but was unable to get in contact, will try again later. - Per Dr. Oriana Gill, angioplasty of left Iliac artery performed. If wound doesn't heal, she would need a fem pop bypass for revascularization. Patient may or may not be candidate for further revasc.   - DSG change with [facility] W2D. - Abx per primary team  - Will monitor. Subjective:  Pt seen in ICU. Pt awake and able to answer questions, no complaints, notes minimal pain from LT foot. To OR shortly for RLE thrombectomy for RLE acute limb ischemia. HPI:  Pt had been following with vascular sx / Dr. Claudene Oaks for PAD, who had noted she likely needed vascular intervention and potentially amputation of her LT 1st ray.      History:  Sepsis ([address]  Gas Gangrene Left Foot  GI bleed  SEVERELY ISCHEMIC RIGHT LEG  No Known Allergies  Family History   Problem Relation Age of Onset    Heart Disease Mother     Stroke Mother       Past Medical History:   Diagnosis Date    CAD (coronary artery disease)     Chronic obstructive pulmonary disease ([address]     emphysema    Chronic respiratory failure with hypoxia (HCC)     Diastolic CHF, chronic ([address]     DM type 2 causing renal disease ([address]     ESRD (end stage renal disease) [facility]     GERD (gastroesophageal reflux disease)     Hypertension     Hypothyroidism      Past Surgical History:   Procedure Laterality Date    COLONOSCOPY N/A 7/12/2017    COLONOSCOPY performed by Reyes Bryant, MD at [facility] END[facility]COPY    HX AORTIC VALVE REPLACEMENT      HX CORONARY ARTERY BYPASS GRAFT      HX TUBAL LIGATION       Social History   Substance Use Topics    Smoking status: Former Smoker     Packs/day: 0.50     Quit date: 4/29/2015    Smokeless tobacco: Never Used    Alcohol use No       History   Alcohol Use No     History   Drug Use No      History   Smoking Status    Former Smoker    Packs/day: 0.50    Quit date: 4/29/2015   Smokeless Tobacco    Never Used     Current Facility-Administered Medications   Medication Dose Route Frequency    ceFAZolin in 0.9% NS (ANCEF) IVPB soln 2 g  2 g IntraVENous ON CALL TO OR    loperamide (IMODIUM) 1 mg/5 mL oral solution 2 mg  2 mg Oral QID PRN    famotidine (PEPCID) tablet 20 mg  20 mg Oral DIALYSIS TUE, THU & SAT    heparin (porcine) injection 5,000 Units  5,000 Units SubCUTAneous Q8H    QUEtiapine (SEROquel) tablet 12.5 mg  12.5 mg Oral BID    insulin lispro (HUMALOG) injection   SubCUTAneous AC&HS    guar gum (BENEFIBER) packet 1 Packet  4 g Oral TID    meropenem (MERREM) 500 mg in 0.9% sodium chloride (MBP/ADV) 50 mL  500 mg IntraVENous Q24H    sevelamer (RENAGEL) tablet 1,600 mg  1,600 mg Oral TID WITH MEALS    sevelamer (RENAGEL) tablet 800 mg  800 mg Oral BID PRN    sodium chloride (NS) flush 5-10 mL  5-10 mL IntraVENous PRN    albuterol-ipratropium (DUO-NEB) 2.5 MG-0.5 MG/3 ML  3 mL Nebulization Q6H PRN    glucose chewable tablet 16 g  4 Tab Oral PRN    dextrose (D50W) injection syrg 12.5-25 g  12.5-25 g IntraVENous PRN    glucagon (GLUCAGEN) injection 1 mg  1 mg IntraMUSCular PRN    acetaminophen (TYLENOL) tablet 650 mg  650 mg Oral Q4H PRN    diphenhydrAMINE (BENADRYL) capsule 25 mg  25 mg Oral Q4H PRN  ondansetron (ZOFRAN) injection 4 mg  4 mg IntraVENous Q4H PRN    fluticasone-vilanterol (BREO ELLIPTA) 100mcg-25mcg/puff  1 Puff Inhalation DAILY    sodium hypochlorite (HALF STRENGTH DAKIN'S) 0.25% irrigation (bottle)   Topical DAILY    0.9% sodium chloride infusion 250 mL  250 mL IntraVENous PRN        Objective:  Vitals:   Patient Vitals for the past 12 hrs:   BP Temp Pulse Resp SpO2 Weight   07/18/17 0937 120/50 98.6 °F (37 °C) (!) 102 24 91 % -   07/18/17 0900 124/45 - 74 22 94 % -   07/18/17 0800 119/43 - 77 21 92 % -   07/18/17 0700 124/49 - 74 20 - -   07/18/17 0619 - - - - - 70.6 kg (155 lb 10.3 oz)   07/18/17 0600 124/53 - 75 25 90 % -   07/18/17 0500 121/45 - 76 21 - -   07/18/17 0400 111/41 98.6 °F (37 °C) 79 24 98 % -   07/18/17 0300 113/45 - 92 24 96 % -   07/18/17 0200 97/48 - 82 (!) 34 95 % -   07/18/17 0100 111/46 - 77 (!) 31 96 % -   07/18/17 0000 101/42 97.7 °F (36.5 °C) 78 25 100 % -   07/17/17 2300 96/52 - 77 25 - -       Vascular:  B/L LE  DP 0/4; PT 0/4  capillary fill time sluggish on RT, brisk on LT, pitting edema is present, skin temperature is cold on the RLE and warm on the LLE, varicosities are present. Dermatological:  Nails are thickened, elongated, discolored, painful to palpation, 3 mm thick, with subungual debris. Skin is dry and scaly, exhibits hemosiderin deposition. Skin cracks present at heels b/l. There is no maceration of the interspaces of the feet b/l. Wound: 1  Location: over LT 1st ray amputation site  Margins: surgical  Drainage: minimal sanguinous  Odor: minimal  Wound base: 30% necrotic, 40% fibrotic,30% granular   Lymphangitic streaking? No.  Undermining? no  Sinus tracts? No.  Exposed bone? yes. Subcutaneous crepitation on palpation? No.    Neurological:  DTR are present, protective sensation per 5.07 [place] monofilament is absent, patient is AAOx3, mood is normal. Epicritic sensation is intact.     Orthopedic:  B/L LE are assymmetric, ROM of ankle, STJ, 1st MTPJ is limited, MMT 5 out of 5 for B/L LE.  LT 1st ray amputation. Constitutional: Pt is a well developed thin elderly AAF. Lymphatics: negative tenderness to palpation of neck/axillary/inguinal nodes. Imaging / Cx / Px:  LT foot XR: IMPRESSION: Status post amputation of the first ray to the first metatarsal base  with packing material in place.     Labs:  Recent Labs      07/18/17   0402   WBC  13.4*   CREA  5.39*   BUN  36*   GLU  101*   HGB  9.5*   HCT  28.1*   NA  135*   K  5.0   CL  105   CO2  19*

## 2017-07-19 NOTE — PROGRESS NOTES
Shift Summary    0700:  Patient resting in bed. No signs of distress noted. 02 4L. 0830:  Dialysis nurse at the bedside setting up for treatment. 1100:  Dr. Isabel Roach at the bedside assessing patient. Per Dr. Isabel Roach, AVF is not functioning well. Patient will need a fistula gram today. 1120: Angio made aware of ordered fistula gram.  Spoke with Willi Felix. Informed her patient had another three hours left on her run. Angio requesting consent for fistula gram and possible intervention. 1550:  Per interventional radiology, they are not doing the fistulagram.  Vascular surgery will do it as they are following. 1558: Spoke with Dr. Arturo Ryder on the phone about the [facility].  Dr. Arturo Ryder says he is not doing the procedure and will look into it further. No new orders. 1613:  Spoke with Dr. Arturo Ryder. He will do fistulagram with possible angioplasty in the cath lab on Friday morning. Patient to be NPO after midnight the night before.     1745:  Patient transferred to fourth floor remote [facility]

## 2017-07-19 NOTE — DIALYSIS
[place] [facility]  [phone]    Vitals   Pre   Post   Assessment   Pre   Post     Temp  Temp: 98.3 °F (36.8 °C) (07/19/17 0945)  98.0 1445 LOC  Alert and oriented to name,time. Can get confused at times A/O x3 continues to get confused at times   HR   101 @0945 86 1445 Lungs   Clear, Diminish Bases  Clear, Diminish Bases, dry cough   B/P   108/45 @0945 117/39 1445 Cardiac   Normal Sinus Normal Sinus   Resp   17 @0945 22 1445 Skin   Warm   Warm   Pain level  0 out of 10 0945 0 out of 10 1445 Edema  Generalized     Generalized   Orders:    Duration:   Start:    1392 End:    1352 Total:   4   Dialyzer:   Dialyzer/Set Up Inspection: Oliver Perla (07/19/17 0945)   K Bath:   Dialysate K (mEq/L): 3 (07/19/17 0945)   Ca Bath:   Dialysate CA (mEq/L): 2.5 (07/19/17 0945)   Na/Bicarb:   Dialysate NA (mEq/L): 138 (07/19/17 0945)   Target Fluid Removal:   Goal/Amount of Fluid to Remove (mL): 2500 mL (07/19/17 0945)   Access     Type & Location:   Left upper AVF; weak thrill and bruit; Dr. Serena Sidhu is aware.    Labs     Obtained/Reviewed   Critical Results Called   Date when labs were drawn-  Hgb-    HGB   Date Value Ref Range Status   07/19/2017 8.8 (L) 11.5 - 16.0 g/dL Final     K-    Potassium   Date Value Ref Range Status   07/19/2017 4.6 3.5 - 5.1 mmol/L Final     Ca-   Calcium   Date Value Ref Range Status   07/19/2017 8.4 (L) 8.5 - 10.1 MG/DL Final     Bun-   BUN   Date Value Ref Range Status   07/19/2017 37 (H) 6 - 20 MG/DL Final     Creat-   Creatinine   Date Value Ref Range Status   07/19/2017 5.78 (H) 0.55 - 1.02 MG/DL Final        Medications/ Blood Products Given     Name   Dose   Route and Time     Albumin 12.5 mg/50ml IV @ 1007   Albumin 12.5mg/50mls IV @ 1220        Blood Volume Processed (BVP):    61.4 Net Fluid   Removed:  1500 mls   Comments   Time Out Done:  5571  Primary Nurse Rpt Pre: Naida Berrios  Primary Nurse Rpt Post: Naida Berrios  Pt Education: procedure, blood pressure, fluid management  Care Plan: Continue plan of care as ordered by nephrologist  Tx Summary: Pre- at the beginning of treatment primary RN was working on putting in an IV access in patient right arm. While the patient treatment was running patient kept moving the left arm with the fistula in return AP and  alarm kept going off. During this time the patient BP systolic was 83. Gave patient a dose of Albumin, during the administration the blood pump stopped because of TMP,  alarm. Dr. Amara Hodgson notified of issues with access. String the machine again, start treatment where treatment was left off. Post Treatment: Patient received two doses of Albumin to keep blood pressure elevated. Treatment worked. Patient treatment went from 3 hours to 4 hours due to access issues that Dr. Cy Bhandari is aware of. Dr. Cy Bhandari ordered an angiogram on the patients left upper AVF. Patient also has poor perfusion from her left foot all the way up to the knee, per podiatrist.  May be reason of AVF on the same side having issues. Flushed patient arterial back without any difficulty. Arterial and Venous bleeding stopped within 5min x2. Admiting Diagnosis: Sepsis, Left Diabetic foot  Pt's [facility]- [facility]  Consent signed - Informed Consent Verified: Yes (07/19/17 0945)  [place] Consent - Yes  Hepatitis Status- neg. (07/14/17)  Immune (03/09/17)  Machine #- Machine Number: [id number] (07/19/17 0945)  Telemetry status- Bedside  Pre-dialysis wt. - Pre-Dialysis Weight: 70.6 kg (155 lb 10.3 oz) (07/19/17 0945)

## 2017-07-19 NOTE — ROUTINE PROCESS
0700 Shift change report received from MAZIN ISBELL. SBAR, Kardex, MAR and Cardiac Rhythm NSR reviewed. TRANSFER - OUT REPORT:    Verbal report given to Sulma RETANA on Aníbal Alaniz  being transferred to Fourth floor [facility] for routine progression of care       Report consisted of patients Situation, Background, Assessment and   Recommendations(SBAR). Information from the following report(s) SBAR, Kardex, Procedure Summary, Intake/Output, MAR and Cardiac Rhythm ST was reviewed with the receiving nurse. Lines:   Peripheral IV 07/19/17 Right Antecubital (Active)   Site Assessment Clean, dry, & intact 7/19/2017  4:00 PM   Phlebitis Assessment 0 7/19/2017  4:00 PM   Infiltration Assessment 0 7/19/2017  4:00 PM   Dressing Status Clean, dry, & intact 7/19/2017  4:00 PM   Dressing Type Transparent 7/19/2017  4:00 PM   Hub Color/Line Status Pink;Patent; Flushed 7/19/2017  4:00 PM   Action Taken Open ports on tubing capped 7/19/2017  4:00 PM   Alcohol Cap Used Yes 7/19/2017  4:00 PM        Opportunity for questions and clarification was provided.       Patient transported with:   Monitor  O2 @ 4 liters  Registered Nurse  [facility]

## 2017-07-19 NOTE — CONSULTS
Palliative Medicine Consult  Bjorn: [phone] ([phone])    Patient Name: David Estrada  YOB: 1945    Date of Initial Consult: 7/13/17  Reason for Consult: Care Decisions  Requesting Provider: Joseph Vladez MD  Primary Care Physician: sAh Love MD      SUMMARY:   David Estrada is a 71 y.o. female,  with a past history of cad, diastolic heart failure , dm , esrd on hemodialysis, daibetes admitted on 7/12/2017 from home with a diagnosis of fatigue , diarrhea , vomiting and left great toe infection on antibiotics for several months, more recently on bactrim, found to have septic shock, in asetting of left foot gangreen , underwent left partial first ray amputation. She lives with her son, Rudolph Santana, minimally ambulatory with rollator,needs assistance with adls at baseline has  [address] [facility] with intermittent confusion. Current medical issues leading to Palliative Medicine involvement include: left foot gangrene, hypotension, ischemic colitis, multiple chronic conditions and dementia. PALLIATIVE DIAGNOSES:   1. Hypotension/ sepsis  2. Leucocytosis  3. Diarrhea  4. Ischemic colitis( flexible sigmoidoscopy 7/12/17)  5. 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 6. Debility. 7. Dementia(moderate )  7. Goals of care / discussion and counseling      PLAN:   Visited patient who is alert , oriented x2, patient does not have full insight as to the severity of her medical condition. Gave me permission to talk to her son /next of kin Mr Mumtaz Dorado. I spoke to her son Alyson Hamman, Mr Rudolph Santana over the phone. 1. Updated on current medical issues, around progression of gangrene of left foot and the option of left Bka vs symptom management / comfort care hospice , given she is at risk of sepsis, without surgery. 2. Educated on hospice in detail. 3. His son shared, he would like to discuss with patient's sisters , who are very supportive for option . 4.  We agreed to follow up to revisit goals of care. 5. Emotional support offered. 6.Patient has a DDNR on chart, confirms to me she does not want CPR, partial code status is reflected in EMR for pressors only . 7. Advance Directive on the chart. we will continue to support patient and family through this difficult course of illness. initial consult note routed to primary continuity provider  Communicated plan of care with: Palliative IDT and dr Arabella Lay. GOALS OF CARE / TREATMENT PREFERENCES:   [====Goals of Care====]  GOALS OF CARE:  Patient / health care proxy stated goals: treatment of acute medical issues. TREATMENT PREFERENCES:   Code Status: Partial Code    Advance Care Planning:  Advance Care Planning 7/18/2017   Patient's Healthcare Decision Maker is: -   Primary Decision Maker Name -   Primary Decision Maker Phone Number -   Primary Decision Maker Relationship to Patient -   Secondary Decision Maker Name -   Secondary Decision [address] Phone Number -   Secondary Decision Maker Relationship to Patient -   Confirm Advance Directive -   Does the patient have other document types (No Data)       Other:    The palliative care team has discussed with patient / health care proxy about goals of care / treatment preferences for patient.  [====Goals of Care====]         HISTORY:     History obtained from: patient and chart. CHIEF COMPLAINT: patient is admitted for weakness and diarrhea. HPI/SUBJECTIVE:    The patient is:   [] Verbal and participatory, poor historian . dirrhea x 2 months and foot infection x 2 months. Currently report diarrhea is better, denies any abdominal pain . 7/14/17: patient reports pain in right foot, diarrhea is better.     Clinical Pain Assessment (nonverbal scale for severity on nonverbal patients):   [++++ Clinical Pain Assessment++++]  [++++Pain Severity++++]: 0/10  [++++Pain Character++++]:    [++++Pain Duration++++]:   [++++Pain Effect++++]:   [++++Pain Factors++++]: [++++Pain Frequency++++]:   [++++Pain Location++++]:   [++++ Clinical Pain Assessment++++]     FUNCTIONAL ASSESSMENT:     Palliative Performance Scale (PPS):  PPS: 30       PSYCHOSOCIAL/SPIRITUAL SCREENING:     Advance Care Planning:  Advance Care Planning 7/18/2017   Patient's Healthcare Decision Maker is: -   Primary Decision Maker Name -   Primary Decision Maker Phone Number -   Primary Decision Maker Relationship to Patient -   Secondary Decision Maker Name -   Secondary Decision [address] Phone Number -   Secondary Decision Maker Relationship to Patient -   Confirm Advance Directive -   Does the patient have other document types (No Data)        Any spiritual / Moravian concerns:  [] Yes /  [] No    Caregiver Burnout:  [] Yes /  [] No /  [] No Caregiver Present      Anticipatory grief assessment:   [] Normal  / [] Maladaptive       ESAS Anxiety: Anxiety: 0    ESAS Depression:          REVIEW OF SYSTEMS:     Positive and pertinent negative findings in ROS are noted above in HPI. The following systems were [x] reviewed / [] unable to be reviewed as noted in HPI  Other findings are noted below. Systems: constitutional, ears/nose/mouth/throat, respiratory, gastrointestinal, genitourinary, musculoskeletal, integumentary, neurologic, psychiatric, endocrine. Positive findings noted below. Modified ESAS Completed by: provider   Fatigue: 3 Drowsiness: 0     Pain: 0   Anxiety: 0 Nausea: 0   Anorexia: 2 Dyspnea: 0     Constipation: No     Stool Occurrence(s): 1        PHYSICAL EXAM:     From RN flowsheet:  Wt Readings from Last 3 Encounters:   07/19/17 140 lb 3.4 oz (63.6 kg)   06/23/17 120 lb (54.4 kg)   07/29/15 121 lb 4.8 oz (55 kg)     Blood pressure 93/42, pulse 88, temperature 98.1 °F (36.7 °C), resp. rate 20, height 5' 6\" (1.676 m), weight 140 lb 3.4 oz (63.6 kg), SpO2 100 %.     Pain Scale 1: Numeric (0 - 10)  Pain Intensity 1: 0     Pain Location 1: Foot  Pain Orientation 1: Anterior, Left  Pain Description 1: Aching, Burning  Pain Intervention(s) 1: Medication (see MAR)  Last bowel movement, if known:     Constitutional:alert, oriented x2,person and place , disoriented to dates. Eyes: pupils equal, anicteric  ENMT: no nasal discharge, moist mucous membranes  Cardiovascular: regular rhythm, distal pulses intact  Respiratory: breathing not labored, symmetric  Gastrointestinal: soft non-tender, +bowel sounds  Musculoskeletal: no deformity, no tenderness to palpation  Skin: warm, dry  Neurologic: following commands, moving all extremities  Psychiatric: restricted affect, no hallucinations  Other: left foot dressing.        HISTORY:     Active Problems:    HTN (hypertension) (12/27/2012)      Coronary atherosclerosis of native coronary artery (12/27/2012)      Other and unspecified hyperlipidemia (12/27/2012)      Anemia (6/29/2015)      Left kidney mass (6/29/2015)      ESRD (end stage renal disease) ([address] (1/90/6566)      Diastolic CHF, chronic (HCC) ()      Chronic respiratory failure with hypoxia (HCC) ()      DM type 2 causing renal disease ([address] ()      Sepsis ([address] (7/12/2017)      Shock ([address] (7/12/2017)      Diarrhea (7/12/2017)      Hypothyroidism ()      GERD (gastroesophageal reflux disease) ()      Chronic obstructive pulmonary disease (HCC) ()      Overview: emphysema      DM foot ulcer ([address] (7/12/2017)      Leukocytosis (7/12/2017)      Lethargy (7/12/2017)      Vomiting (7/12/2017)      FTT (failure to thrive) in adult (7/12/2017)      Debilitated patient (7/12/2017)      Past Medical History:   Diagnosis Date    CAD (coronary artery disease)     Chronic obstructive pulmonary disease (HCC)     emphysema    Chronic respiratory failure with hypoxia (HCC)     Diastolic CHF, chronic ([address]     DM type 2 causing renal disease (HCC)     ESRD (end stage renal disease) ([address]     GERD (gastroesophageal reflux disease)     Hypertension     Hypothyroidism       Past Surgical History:   Procedure Laterality Date  COLONOSCOPY N/A 7/12/2017    COLONOSCOPY performed by Donald Herrera MD at [facility] ENDOSCOPY    HX AORTIC VALVE REPLACEMENT      HX CORONARY ARTERY BYPASS GRAFT      HX TUBAL LIGATION        Family History   Problem Relation Age of Onset    Heart Disease Mother     Stroke Mother       History reviewed, no pertinent family history.   Social History   Substance Use Topics    Smoking status: Former Smoker     Packs/day: 0.50     Quit date: 4/29/2015    Smokeless tobacco: Never Used    Alcohol use No     No Known Allergies   Current Facility-Administered Medications   Medication Dose Route Frequency    albumin human 25% (BUMINATE) solution 12.5 g  12.5 g IntraVENous TID PRN    albuterol (PROVENTIL HFA, VENTOLIN HFA, PROAIR HFA) inhaler 2 Puff  2 Puff Inhalation Q4H PRN    ferrous sulfate tablet 325 mg  325 mg Oral DAILY    pantoprazole (PROTONIX) tablet 40 mg  40 mg Oral BID    simvastatin (ZOCOR) tablet 20 mg  20 mg Oral QHS    SITagliptin (JANUVIA) tablet tab 25 mg  25 mg Oral DAILY    umeclidinium (INCRUSE ELLIPTA) 62.5 mcg/actuation  1 Puff Inhalation DAILY    doxycycline (VIBRAMYCIN) 100 mg in 0.9% sodium chloride (MBP/ADV) 100 mL  100 mg IntraVENous Q12H    loperamide (IMODIUM) 1 mg/5 mL oral solution 2 mg  2 mg Oral QID PRN    famotidine (PEPCID) tablet 20 mg  20 mg Oral DIALYSIS TUE, THU & SAT    QUEtiapine (SEROquel) tablet 12.5 mg  12.5 mg Oral BID    insulin lispro (HUMALOG) injection   SubCUTAneous AC&HS    guar gum (BENEFIBER) packet 1 Packet  4 g Oral TID    meropenem (MERREM) 500 mg in 0.9% sodium chloride (MBP/ADV) 50 mL  500 mg IntraVENous Q24H    sevelamer (RENAGEL) tablet 1,600 mg  1,600 mg Oral TID WITH MEALS    sevelamer (RENAGEL) tablet 800 mg  800 mg Oral BID PRN    sodium chloride (NS) flush 5-10 mL  5-10 mL IntraVENous PRN    albuterol-ipratropium (DUO-NEB) 2.5 MG-0.5 MG/3 ML  3 mL Nebulization Q6H PRN    glucose chewable tablet 16 g  4 Tab Oral PRN    dextrose (D50W) injection syrg 12.5-25 g  12.5-25 g IntraVENous PRN    glucagon (GLUCAGEN) injection 1 mg  1 mg IntraMUSCular PRN    acetaminophen (TYLENOL) tablet 650 mg  650 mg Oral Q4H PRN    diphenhydrAMINE (BENADRYL) capsule 25 mg  25 mg Oral Q4H PRN    ondansetron (ZOFRAN) injection 4 mg  4 mg IntraVENous Q4H PRN    fluticasone-vilanterol (BREO ELLIPTA) 100mcg-25mcg/puff  1 Puff Inhalation DAILY    sodium hypochlorite (HALF STRENGTH DAKIN'S) 0.25% irrigation (bottle)   Topical DAILY     Current Outpatient Prescriptions   Medication Sig    SITagliptin (JANUVIA) 25 mg tablet Take 25 mg by mouth daily.  sevelamer carbonate (RENVELA) 800 mg tab tab Take 1,600 mg by mouth three (3) times daily. 1 tab bid with meals    simvastatin (ZOCOR) 20 mg tablet Take 20 mg by mouth nightly.  trimethoprim-sulfamethoxazole (BACTRIM DS) 160-800 mg per tablet Take 1 Tab by mouth two (2) times a day.  albuterol (PROAIR HFA) 90 mcg/actuation inhaler Take 2 Puffs by inhalation every four (4) hours as needed for Wheezing.  sevelamer (RENAGEL) 800 mg tablet Take 800 mg by mouth daily. With snacks    isosorbide mononitrate ER (IMDUR) 30 mg tablet Take 30 mg by mouth daily.  ferrous sulfate 325 mg (65 mg iron) tablet Take 325 mg by mouth daily.  metoprolol tartrate (LOPRESSOR) 25 mg tablet Take 12.5 mg by mouth two (2) times a day.  tiotropium (SPIRIVA) 18 mcg inhalation capsule Take 1 Cap by inhalation daily.  fluticasone-salmeterol (ADVAIR DISKUS) 250-50 mcg/dose diskus inhaler Take 1 Puff by inhalation every twelve (12) hours.  pantoprazole (PROTONIX) 40 mg tablet Take 40 mg by mouth two (2) times a day.  amLODIPine (NORVASC) 10 mg tablet Take 10 mg by mouth daily.           LAB AND IMAGING FINDINGS:     Lab Results   Component Value Date/Time    WBC 9.6 07/19/2017 02:31 AM    HGB 8.8 07/19/2017 02:31 AM    PLATELET 769 66/93/4853 02:31 AM     Lab Results   Component Value Date/Time    Sodium 139 07/19/2017 02:31 AM    Potassium 4.6 07/19/2017 02:31 AM    Chloride 106 07/19/2017 02:31 AM    CO2 18 07/19/2017 02:31 AM    BUN 37 07/19/2017 02:31 AM    Creatinine 5.78 07/19/2017 02:31 AM    Calcium 8.4 07/19/2017 02:31 AM    Magnesium 1.9 07/19/2017 02:31 AM    Phosphorus 4.4 07/13/2017 04:47 AM      Lab Results   Component Value Date/Time    AST (SGOT) 135 07/19/2017 02:31 AM    Alk. phosphatase 146 07/19/2017 02:31 AM    Protein, total 6.4 07/19/2017 02:31 AM    Albumin 1.8 07/19/2017 02:31 AM    Globulin 4.6 07/19/2017 02:31 AM     Lab Results   Component Value Date/Time    INR 1.4 06/23/2017 04:20 PM    Prothrombin time 14.5 06/23/2017 04:20 PM    aPTT 37.3 06/30/2015 11:55 AM      No results found for: IRON, FE, TIBC, IBCT, PSAT, FERR   No results found for: PH, PCO2, PO2  No components found for: GLPOC   No results found for: CPK, CKMB             Total time: 35 mins  Counseling / coordination time, spent as noted above: 20 mins  > 50% counseling / coordination?: yes. Prolonged service was provided for  []30 min   []75 min in face to face time in the presence of the patient, spent as noted above. Time Start:   Time End:   Note: this can only be billed with 02660 (initial) or 99770 (follow up). If multiple start / stop times, list each separately.

## 2017-07-19 NOTE — PROGRESS NOTES
Lauryn Wiley  YOB: 1945          Assessment & Plan:   ESRD  · HD TTS at [facility]  · Off schedule and running MWF now. · Cannulation/access function a challenge again today  · Will order f-gram.    S/p R fem thrombectomy    DM foot ulcer s/p amputation    ADAM   · On sevelamer    Diarrhea/ischemic colitis    FTT/malnutrition       Subjective:   CC: f/u ESRD  HPI: HD started this am and clotted. Restarting.    ROS: No sob/n/v  Current Facility-Administered Medications   Medication Dose Route Frequency    albumin human 25% (BUMINATE) solution 12.5 g  12.5 g IntraVENous TID PRN    albuterol (PROVENTIL HFA, VENTOLIN HFA, PROAIR HFA) inhaler 2 Puff  2 Puff Inhalation Q4H PRN    ferrous sulfate tablet 325 mg  325 mg Oral DAILY    pantoprazole (PROTONIX) tablet 40 mg  40 mg Oral BID    simvastatin (ZOCOR) tablet 20 mg  20 mg Oral QHS    SITagliptin (JANUVIA) tablet tab 25 mg  25 mg Oral DAILY    umeclidinium (INCRUSE ELLIPTA) 62.5 mcg/actuation  1 Puff Inhalation DAILY    doxycycline (VIBRAMYCIN) 100 mg in 0.9% sodium chloride (MBP/ADV) 100 mL  100 mg IntraVENous Q12H    loperamide (IMODIUM) 1 mg/5 mL oral solution 2 mg  2 mg Oral QID PRN    famotidine (PEPCID) tablet 20 mg  20 mg Oral DIALYSIS TUE, THU & SAT    QUEtiapine (SEROquel) tablet 12.5 mg  12.5 mg Oral BID    insulin lispro (HUMALOG) injection   SubCUTAneous AC&HS    guar gum (BENEFIBER) packet 1 Packet  4 g Oral TID    meropenem (MERREM) 500 mg in 0.9% sodium chloride (MBP/ADV) 50 mL  500 mg IntraVENous Q24H    sevelamer (RENAGEL) tablet 1,600 mg  1,600 mg Oral TID WITH MEALS    sevelamer (RENAGEL) tablet 800 mg  800 mg Oral BID PRN    sodium chloride (NS) flush 5-10 mL  5-10 mL IntraVENous PRN    albuterol-ipratropium (DUO-NEB) 2.5 MG-0.5 MG/3 ML  3 mL Nebulization Q6H PRN    glucose chewable tablet 16 g  4 Tab Oral PRN    dextrose (D50W) injection syrg 12.5-25 g 12.5-25 g IntraVENous PRN    glucagon (GLUCAGEN) injection 1 mg  1 mg IntraMUSCular PRN    acetaminophen (TYLENOL) tablet 650 mg  650 mg Oral Q4H PRN    diphenhydrAMINE (BENADRYL) capsule 25 mg  25 mg Oral Q4H PRN    ondansetron (ZOFRAN) injection 4 mg  4 mg IntraVENous Q4H PRN    fluticasone-vilanterol (BREO ELLIPTA) 100mcg-25mcg/puff  1 Puff Inhalation DAILY    sodium hypochlorite (HALF STRENGTH DAKIN'S) 0.25% irrigation (bottle)   Topical DAILY          Objective:     Vitals:  Blood pressure (!) 83/38, pulse (!) 101, temperature 98.3 °F (36.8 °C), temperature source Oral, resp. rate 26, height 5' 6\" (1.676 m), weight 70.6 kg (155 lb 10.3 oz), SpO2 100 %. Temp (24hrs), Av °F (36.7 °C), Min:97.6 °F (36.4 °C), Max:98.3 °F (36.8 °C)      Intake and Output:      1901 -  0700  In: 2550 [I.V.:2550]  Out: 525 [Drains:75]    Physical Exam:               GENERAL ASSESSMENT: Chronically ill, NAD  CHEST: CTA  HEART: S1S2 +m  ABDOMEN: Soft,NT  EXTREMITY: no EDEMA, LUE AVF   NEURO: sedated          ECG/rhythm:    Data Review      No results for input(s): TNIPOC in the last 72 hours. No lab exists for component: ITNL   No results for input(s): CPK, CKMB, TROIQ in the last 72 hours. Recent Labs      17   0231  17   1225  17   0402  17   0324   NA  139  138  135*  142   K  4.6  4.0  5.0  4.0   CL  106  105  105  108   CO2  18*  18*  19*  18*   BUN  37*  35*  36*  47*   CREA  5.78*  5.50*  5.39*  6.32*   GLU  87  103*  101*  154*   MG  1.9   --   2.0  2.1   CA  8.4*  8.6  8.5  8.4*   ALB  1.8*   --   1.9*  2.0*   WBC  9.6  13.6*  13.4*  17.3*   HGB  8.8*  8.6*  9.5*  9.3*   HCT  27.3*  26.4*  28.1*  27.9*   PLT  167  197  194  203      No results for input(s): INR, PTP, APTT in the last 72 hours.     No lab exists for component: INREXT, INREXT  Needs: urine analysis, urine sodium, protein and creatinine  No results found for: FELICITAS MERRITT      : Abdelrahman Coleman, MD  7/19/2017        [facility]:  [email or web address]  [facility] office:  [address] [place] [address], [address] [place] [address],[address]  [place], [address] [facility]  Phone: [phone]  Fax :     [phone]    [place] office:  [address] [facility], [facility] [address]   Phone - [phone]  Fax - [phone]

## 2017-07-19 NOTE — PROGRESS NOTES
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  - LT foot surgical site exhibits extensive necrotic skin / fat / bone, would appear the gas gangrene has been surgically managed but now vascular gangrene continues to progress despite revascularization on the LLE with Dr. Bunny Rosas, with new necrosis extending proximal almost to ankle. - At this point I am not confident that the limb is salvageable, and that she may need to undergo LLE BKA. Discussed these issues with pt's son Mr. Neftali Reed and her sister, who will discuss these issues with the rest of the family, that being proximal amputation versus palliative care given her multiple other medical issues, also discussed case with palliative care Dr. Aylin Rodríguez.    - DSG change with Dakins W2D. - Abx per primary team  - Will monitor. Subjective:  Pt seen in ICU. Pt awake and able to answer questions, no complaints, notes minimal pain from LT foot. Just finished HD. To OR shortly for fistulogram as she is having issues getting HD. Discussed case with her sister at [facility].    HPI:  Pt had been following with vascular sx / Dr. Samantha Sharma for PAD, who had noted she likely needed vascular intervention and potentially amputation of her LT 1st ray.      History:  Sepsis ([address]  Gas Gangrene Left Foot  GI bleed  SEVERELY ISCHEMIC RIGHT LEG  No Known Allergies  Family History   Problem Relation Age of Onset    Heart Disease Mother     Stroke Mother       Past Medical History:   Diagnosis Date    CAD (coronary artery disease)     Chronic obstructive pulmonary disease ([address]     emphysema    Chronic respiratory failure with hypoxia (HCC)     Diastolic CHF, chronic ([address]     DM type 2 causing renal disease (HCC)     ESRD (end stage renal disease) (HCC)     GERD (gastroesophageal reflux disease)     Hypertension     Hypothyroidism      Past Surgical History:   Procedure Laterality Date    COLONOSCOPY N/A 7/12/2017    COLONOSCOPY performed by Zoya Vidal MD at [facility] ENDOSCOPY    HX AORTIC VALVE REPLACEMENT      HX CORONARY ARTERY BYPASS GRAFT      HX TUBAL LIGATION       Social History   Substance Use Topics    Smoking status: Former Smoker     Packs/day: 0.50     Quit date: 4/29/2015    Smokeless tobacco: Never Used    Alcohol use No       History   Alcohol Use No     History   Drug Use No      History   Smoking Status    Former Smoker    Packs/day: 0.50    Quit date: 4/29/2015   Smokeless Tobacco    Never Used     Current Facility-Administered Medications   Medication Dose Route Frequency    albumin human 25% (BUMINATE) solution 12.5 g  12.5 g IntraVENous TID PRN    albuterol (PROVENTIL HFA, VENTOLIN HFA, PROAIR HFA) inhaler 2 Puff  2 Puff Inhalation Q4H PRN    ferrous sulfate tablet 325 mg  325 mg Oral DAILY    pantoprazole (PROTONIX) tablet 40 mg  40 mg Oral BID    simvastatin (ZOCOR) tablet 20 mg  20 mg Oral QHS    SITagliptin (JANUVIA) tablet tab 25 mg  25 mg Oral DAILY    umeclidinium (INCRUSE ELLIPTA) 62.5 mcg/actuation  1 Puff Inhalation DAILY    doxycycline (VIBRAMYCIN) 100 mg in 0.9% sodium chloride (MBP/ADV) 100 mL  100 mg IntraVENous Q12H    loperamide (IMODIUM) 1 mg/5 mL oral solution 2 mg  2 mg Oral QID PRN    famotidine (PEPCID) tablet 20 mg  20 mg Oral DIALYSIS TUE, THU & SAT    QUEtiapine (SEROquel) tablet 12.5 mg  12.5 mg Oral BID    insulin lispro (HUMALOG) injection   SubCUTAneous AC&HS    guar gum (BENEFIBER) packet 1 Packet  4 g Oral TID    meropenem (MERREM) 500 mg in 0.9% sodium chloride (MBP/ADV) 50 mL  500 mg IntraVENous Q24H    sevelamer (RENAGEL) tablet 1,600 mg  1,600 mg Oral TID WITH MEALS    sevelamer (RENAGEL) tablet 800 mg  800 mg Oral BID PRN    sodium chloride (NS) flush 5-10 mL  5-10 mL IntraVENous PRN    albuterol-ipratropium (DUO-NEB) 2.5 MG-0.5 MG/3 ML  3 mL Nebulization Q6H PRN    glucose chewable tablet 16 g  4 Tab Oral PRN    dextrose (D50W) injection syrg 12.5-25 g  12.5-25 g IntraVENous PRN    glucagon (GLUCAGEN) injection 1 mg  1 mg IntraMUSCular PRN    acetaminophen (TYLENOL) tablet 650 mg  650 mg Oral Q4H PRN    diphenhydrAMINE (BENADRYL) capsule 25 mg  25 mg Oral Q4H PRN    ondansetron (ZOFRAN) injection 4 mg  4 mg IntraVENous Q4H PRN    fluticasone-vilanterol (BREO ELLIPTA) 100mcg-25mcg/puff  1 Puff Inhalation DAILY    sodium hypochlorite (HALF STRENGTH DAKIN'S) 0.25% irrigation (bottle)   Topical DAILY        Objective:  Vitals:   Patient Vitals for the past 12 hrs:   BP Temp Temp src Pulse Resp SpO2 Weight   07/19/17 1501 - - - - - - 63.6 kg (140 lb 3.4 oz)   07/19/17 1445 (!) 117/39 98 °F (36.7 °C) Oral 86 22 100 % -   07/19/17 1345 105/48 - - 82 - - -   07/19/17 1330 107/41 - - 87 - - -   07/19/17 1315 93/44 - - (!) 107 - - -   07/19/17 1259 92/43 - - 98 - - -   07/19/17 1245 100/43 - - (!) 107 - - -   07/19/17 1230 94/46 - - (!) 107 - - -   07/19/17 1215 93/46 - - (!) 102 - - -   07/19/17 1200 102/46 98 °F (36.7 °C) - (!) 104 20 100 % -   07/19/17 1145 101/45 - - (!) 103 - - -   07/19/17 1130 103/44 - - (!) 103 18 - -   07/19/17 1115 94/48 - - (!) 104 20 100 % -   07/19/17 1100 98/55 - - (!) 103 26 100 % -   07/19/17 1000 (!) 83/38 - - (!) 101 25 100 % -   07/19/17 0945 108/45 98.3 °F (36.8 °C) Oral 100 17 100 % -   07/19/17 0900 106/47 - - (!) 101 18 100 % -   07/19/17 0800 100/42 98.2 °F (36.8 °C) - (!) 102 21 100 % -   07/19/17 0700 102/41 98.2 °F (36.8 °C) - (!) 102 29 (!) 88 % -   07/19/17 0601 102/43 - - (!) 101 20 100 % -   07/19/17 0600 (!) 83/53 - - 100 21 100 % -   07/19/17 0500 (!) 94/33 - - (!) 101 23 99 % -   07/19/17 0400 97/58 98.2 °F (36.8 °C) - 89 21 94 % -       Vascular:  B/L LE  DP 0/4; PT 0/4  capillary fill time sluggish on RT, brisk on LT, pitting edema is present, skin temperature is cold on the RLE and warm on the LLE, varicosities are present. Dermatological:  Nails are thickened, elongated, discolored, painful to palpation, 3 mm thick, with subungual debris. Skin is dry and scaly, exhibits hemosiderin deposition. Skin cracks present at heels b/l. There is no maceration of the interspaces of the feet b/l. Wound: 1  Location: over LT 1st ray amputation site  Margins: surgical  Drainage: minimal sanguinous  Odor: minimal  Wound base: 30% necrotic, 70% fibrotic  Lymphangitic streaking? No.  Undermining? no  Sinus tracts? No.  Exposed bone? yes. Subcutaneous crepitation on palpation? No.    New extension of gangrenous changes to proximal surgical site. Neurological:  DTR are present, protective sensation per 5.07 [place] Kenan monofilament is absent, patient is AAOx3, mood is normal. Epicritic sensation is intact. Orthopedic:  B/L LE are assymmetric, ROM of ankle, STJ, 1st MTPJ is limited, MMT 5 out of 5 for B/L LE.  LT 1st ray amputation. Constitutional: Pt is a well developed thin elderly AAF. Lymphatics: negative tenderness to palpation of neck/axillary/inguinal nodes. Imaging / Cx / Px:  LT foot XR: IMPRESSION: Status post amputation of the first ray to the first metatarsal base  with packing material in place.     Labs:  Recent Labs      07/19/17   0231   WBC  9.6   CREA  5.78*   BUN  37*   GLU  87   HGB  8.8*   HCT  27.3*   NA  139   K  4.6   CL  106   CO2  18*

## 2017-07-19 NOTE — PROGRESS NOTES
Daily Progress Note: 7/19/2017  Emmanuel Jean-Baptiste MD    Assessment/Plan:   Severe Sepsis / Shock / Leukocytosis - POA, ischemic colitis and DM foot ulcer/gas gangrene/vascular gangrene. Sepsis protocol. Empiric Abx (Vanco, l, zosyn) - Vanc now DCed and Merrem on but some staph on culture and pharm advised G+ coverage - Doxy added 7/18. Off Anant     DM foot ulcer/Gas gangrene left toe/left hallux and 1st interspace gas gangrene/vascular gangrrene - POA. continue antibiotics. Podiatry did left 1st Ray Amputation 7/13. Vas Surg, Kumar Ross, did Right Fem thrombectomy and endarterectomy AM 7/18.      Diarrhea - likely from Ischemic Colitis - Immodium, Guar gum, Banatrol     FTT (failure to thrive) in adult / Lethargy / Debilitated patient / Vomiting - POA, worse than baseline due to sepsis. Fall precautions. Will need PT/OT eval when better. Palliative consulted     Chronic diastolic heart failure -   Hydrate as needed, and allow HD to handle final fluid volume.      ESRD (end stage renal disease) - Consult renal to handle HD.  Renal diet when eating. Continue sevelamer when eating.      Anemia - POA presumed related to ESRD. She takes iron, resume when eating      Coronary atherosclerosis of native coronary artery / HTN (hypertension) - No acute issues. Monitor tele. Appreciate Cardiology consult. Resume IMDUR, metoprolol and norvasc when able     Chronic respiratory failure with hypoxia / Chronic airway obstruction, not elsewhere classified/Chronic scarring RLL with chronic right pleural effusion - Per Pulmonary. Continue Advair, and prn duonebs. Oxygen as needed.      Hyperlipidemia - Continue zocor. No evidence of benefit in Pt with ESRD. I would stop it, and hold for now      Hypothyroidism - No longer listing synthroid. Check TSH.     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    Prior A1c was 5.7.       DJD / Neuropathy - Prn tylenol    Ischemic Colitis: Sigmoid done 7/13 by Dr Leonel Salas - per GI.        Problem List:  Problem List as of 7/19/2017  Date Reviewed: 6/29/2015          Codes Class Noted - Resolved    Sepsis ([facility] ICD-10-CM: A41.9  ICD-9-CM: 038.9, 995.91  7/12/2017 - Present        Shock ([facility] ICD-10-CM: R57.9  ICD-9-CM: 785.50  7/12/2017 - Present        Diarrhea ICD-10-CM: R19.7  ICD-9-CM: 787.91  7/12/2017 - Present        Hypothyroidism ICD-10-CM: E03.9  ICD-9-CM: 244.9  Unknown - Present        GERD (gastroesophageal reflux disease) ICD-10-CM: K21.9  ICD-9-CM: 530.81  Unknown - Present        Chronic obstructive pulmonary disease ([facility] ICD-10-CM: J44.9  ICD-9-CM: 306  Unknown - Present    Overview Signed 7/12/2017  3:31 PM by Racheal Boyle MD     emphysema             DM foot ulcer ([facility] ICD-10-CM: E11.621, L97.509  ICD-9-CM: 250.80, 707.15  7/12/2017 - Present        Leukocytosis ICD-10-CM: U97.560  ICD-9-CM: 288.60  7/12/2017 - Present        Lethargy ICD-10-CM: R53.83  ICD-9-CM: 780.79  7/12/2017 - Present        Vomiting ICD-10-CM: R11.10  ICD-9-CM: 787.03  7/12/2017 - Present        FTT (failure to thrive) in adult ICD-10-CM: R62.7  ICD-9-CM: 783.7  7/12/2017 - Present        Debilitated patient ICD-10-CM: R53.81  ICD-9-CM: 799.3  7/12/2017 - Present        Anemia ICD-10-CM: D64.9  ICD-9-CM: 285.9  6/29/2015 - Present        Left kidney mass ICD-10-CM: N28.89  ICD-9-CM: 593.9  6/29/2015 - Present        ESRD (end stage renal disease) ([facility] ICD-10-CM: N18.6  ICD-9-CM: 585.6  6/29/2015 - Present        Diastolic CHF, chronic (HCC) ICD-10-CM: I50.32  ICD-9-CM: 428.32, 428.0  Unknown - Present        Chronic respiratory failure with hypoxia (HCC) ICD-10-CM: J96.11  ICD-9-CM: 518.83, 799.02  Unknown - Present        DM type 2 causing renal disease (HCC) ICD-10-CM: E11.29  ICD-9-CM: 250.40  Unknown - Present        HTN (hypertension) ICD-10-CM: I10  ICD-9-CM: 401.9  12/27/2012 - Present        Coronary atherosclerosis of native coronary artery ICD-10-CM: I25.10  ICD-9-CM: 414.01  12/27/2012 - Present        Other and unspecified hyperlipidemia ICD-10-CM: E78.5  ICD-9-CM: 272.4  12/27/2012 - Present        RESOLVED: Hypertension ICD-10-CM: I10  ICD-9-CM: 401.9  Unknown - 7/12/2017        RESOLVED: ARF (acute renal failure) (HCC) ICD-10-CM: N17.9  ICD-9-CM: 584.9  6/29/2015 - 6/29/2015        RESOLVED: Ascites ICD-10-CM: R18.8  ICD-9-CM: 789.59  6/29/2015 - 7/12/2017        RESOLVED: Pleural effusion on right ICD-10-CM: J90  ICD-9-CM: 511.9  6/29/2015 - 7/12/2017        RESOLVED: Cystitis ICD-10-CM: N30.90  ICD-9-CM: 595.9  6/29/2015 - 7/12/2017        RESOLVED: Abdominal pain ICD-10-CM: R10.9  ICD-9-CM: 789.00  6/29/2015 - 7/12/2017        RESOLVED: CAD (coronary artery disease) ICD-10-CM: I25.10  ICD-9-CM: 414.00  Unknown - 7/12/2017        RESOLVED: Chronic diastolic heart failure ([facility] ICD-10-CM: I50.32  ICD-9-CM: 428.32  5/31/2013 - 7/12/2017        RESOLVED: Altered mental status ICD-10-CM: R41.82  ICD-9-CM: 780.97  1/3/2013 - 6/29/2015        RESOLVED: DM (diabetes mellitus) ([facility] ICD-10-CM: E11.9  ICD-9-CM: 250.00  12/27/2012 - 6/29/2015        RESOLVED: Chronic airway obstruction, not elsewhere classified ICD-10-CM: J44.9  ICD-9-CM: 551  12/27/2012 - 7/12/2017        RESOLVED: Tobacco abuse ICD-10-CM: Z72.0  ICD-9-CM: 305.1  12/27/2012 - 7/12/2017              Subjective:    71 y.o.  female who presented to the Emergency Department complaining of fatigue and diarrhea. She provides minimal hx. Per family, diarrhea for >4weeks. Lower abd pain for 2 days, with bilious vomiting last night  L great toe/Foot infection, not improving on Bactrim x3 weeks. Tolerating HD up to yesterday, but today in septic shock. Falling at home. We will admit her for management (dr Chichi Patten)    7/13: She reports she is feeling some better. Little pain at this time.   Underwent left 1st ray amputation today by podiatry for gas gangrene. Sigmoid by Dr Tip Davidson on  revealed ischemic colitis. Requiring anant for BP support. :  Reports a little foot pain. No further diarrhea she reports. No ab pain. Still on Anant but weaning. Planned angio LEs today per vascular surg. 7/15: Reports some pain in her foot. She is still on Anant to keep SBP >90. Nurses report no pulse palpable in her right foot and foot is cool to touch. Palliative care was consulted. Had angioplasty of left iliac yesterday. : Nurses report she pulled her central line out last night. More confused at night. Was not able to complete dialysis due to clotting. BP has been ok off Anant.     :  Remains confused. Still having loose stools - C diff neg. Attempting dialysis again today. Vanc DCed and Merrem on.     :  Confused off and on. Loose stools somewhat better per pt. Completed HD yesterday and planned again tomorrow. Now on Doxy and Merrem. : Less leg pain. She reports no new complaints. Still confused off and on. Dialysis today. Review of Systems:   A comprehensive review of systems was negative except for that written in the HPI. Objective:   Physical Exam:     Visit Vitals    /41    Pulse (!) 102    Temp 98.2 °F (36.8 °C)    Resp 29    Ht 5' 6\" (1.676 m)    Wt 70.6 kg (155 lb 10.3 oz)    SpO2 (!) 88%    BMI 25.12 kg/m2    O2 Flow Rate (L/min): 2 l/min O2 Device: Nasal cannula    Temp (24hrs), Av.9 °F (36.6 °C), Min:97.6 °F (36.4 °C), Max:98.2 °F (36.8 °C)         1901 -  0700  In: 2550 [I.V.:2550]  Out: 525 [Drains:75]    General:  Less Alert today, cooperative, no distress, appears stated age. Head:  Normocephalic, without obvious abnormality, atraumatic. Eyes:  Conjunctivae/corneas clear. EOMs intact. Throat: Lips, mucosa, and tongue moist..   Neck: Supple, symmetrical, trachea midline, no adenopathy, thyroid: no enlargement/tenderness/nodules, no carotid bruit and no JVD.    Lungs: Scattered rhonchi but otherwise clear to auscultation bilaterally. Heart:  Regular rate and rhythm, S1, S2 normal, no murmur, click, rub or gallop. Abdomen:   Soft, non-tender. Bowel sounds normal. No masses,  No organomegaly. Extremities: no cyanosis or edema. No calf tenderness or cords. There is a dialysis shunt in the L-upper arm. Dressing dry over left foot. Pulses: No pulses palpable in the LEs   Skin:  turgor normal.    Neurologic:  Alert and oriented X 1-2. No cogwheeling or rigidity. Gait not tested at this time.     Gross motor of extremities normal.       Data Review:       Recent Days:  Recent Labs      07/19/17   0231  07/18/17   1225  07/18/17   0402   WBC  9.6  13.6*  13.4*   HGB  8.8*  8.6*  9.5*   HCT  27.3*  26.4*  28.1*   PLT  167  197  194     Recent Labs      07/19/17   0231  07/18/17   1225  07/18/17   0402  07/17/17   0324   NA  139  138  135*  142   K  4.6  4.0  5.0  4.0   CL  106  105  105  108   CO2  18*  18*  19*  18*   GLU  87  103*  101*  154*   BUN  37*  35*  36*  47*   CREA  5.78*  5.50*  5.39*  6.32*   CA  8.4*  8.6  8.5  8.4*   MG  1.9   --   2.0  2.1   ALB  1.8*   --   1.9*  2.0*   TBILI  0.9   --   1.1*  0.9   SGOT  135*   --   147*  125*   ALT  35   --   46  46       24 Hour Results:  Recent Results (from the past 24 hour(s))   GLUCOSE, POC    Collection Time: 07/18/17 12:06 PM   Result Value Ref Range    Glucose (POC) 99 65 - 100 mg/dL    Performed by Raiza Schuster    CBC W/O DIFF    Collection Time: 07/18/17 12:25 PM   Result Value Ref Range    WBC 13.6 (H) 3.6 - 11.0 K/uL    RBC 2.92 (L) 3.80 - 5.20 M/uL    HGB 8.6 (L) 11.5 - 16.0 g/dL    HCT 26.4 (L) 35.0 - 47.0 %    MCV 90.4 80.0 - 99.0 FL    MCH 29.5 26.0 - 34.0 PG    MCHC 32.6 30.0 - 36.5 g/dL    RDW 19.5 (H) 11.5 - 14.5 %    PLATELET 150 411 - 723 K/uL   METABOLIC PANEL, BASIC    Collection Time: 07/18/17 12:25 PM   Result Value Ref Range    Sodium 138 136 - 145 mmol/L    Potassium 4.0 3.5 - 5.1 mmol/L Chloride 105 97 - 108 mmol/L    CO2 18 (L) 21 - 32 mmol/L    Anion gap 15 5 - 15 mmol/L    Glucose 103 (H) 65 - 100 mg/dL    BUN 35 (H) 6 - 20 MG/DL    Creatinine 5.50 (H) 0.55 - 1.02 MG/DL    BUN/Creatinine ratio 6 (L) 12 - 20      GFR est AA 9 (L) >60 ml/min/1.73m2    GFR est non-AA 8 (L) >60 ml/min/1.73m2    Calcium 8.6 8.5 - 10.1 MG/DL   GLUCOSE, POC    Collection Time: 07/18/17  1:31 PM   Result Value Ref Range    Glucose (POC) 91 65 - 100 mg/dL    Performed by [employer], POC    Collection Time: 07/18/17  4:41 PM   Result Value Ref Range    Glucose (POC) 116 (H) 65 - 100 mg/dL    Performed by [employer], POC    Collection Time: 07/18/17  9:30 PM   Result Value Ref Range    Glucose (POC) 103 (H) 65 - 100 mg/dL    Performed by Wen Maloney    MAGNESIUM    Collection Time: 07/19/17  2:31 AM   Result Value Ref Range    Magnesium 1.9 1.6 - 2.4 mg/dL   CBC WITH AUTOMATED DIFF    Collection Time: 07/19/17  2:31 AM   Result Value Ref Range    WBC 9.6 3.6 - 11.0 K/uL    RBC 2.92 (L) 3.80 - 5.20 M/uL    HGB 8.8 (L) 11.5 - 16.0 g/dL    HCT 27.3 (L) 35.0 - 47.0 %    MCV 93.5 80.0 - 99.0 FL    MCH 30.1 26.0 - 34.0 PG    MCHC 32.2 30.0 - 36.5 g/dL    RDW 20.4 (H) 11.5 - 14.5 %    PLATELET 578 654 - 549 K/uL    NEUTROPHILS 84 (H) 32 - 75 %    BAND NEUTROPHILS 1 0 - 6 %    LYMPHOCYTES 4 (L) 12 - 49 %    MONOCYTES 9 5 - 13 %    EOSINOPHILS 1 0 - 7 %    BASOPHILS 1 0 - 1 %    ABS. NEUTROPHILS 8.1 (H) 1.8 - 8.0 K/UL    ABS. LYMPHOCYTES 0.4 (L) 0.8 - 3.5 K/UL    ABS. MONOCYTES 0.9 0.0 - 1.0 K/UL    ABS. EOSINOPHILS 0.1 0.0 - 0.4 K/UL    ABS.  BASOPHILS 0.1 0.0 - 0.1 K/UL    DF MANUAL      RBC COMMENTS ANISOCYTOSIS  2+        RBC COMMENTS POLYCHROMASIA  1+        RBC COMMENTS TARGET CELLS  LOREN CELLS  RBC FRAGMENTS       METABOLIC PANEL, COMPREHENSIVE    Collection Time: 07/19/17  2:31 AM   Result Value Ref Range    Sodium 139 136 - 145 mmol/L    Potassium 4.6 3.5 - 5.1 mmol/L    Chloride 106 97 - 108 mmol/L    CO2 18 (L) 21 - 32 mmol/L    Anion gap 15 5 - 15 mmol/L    Glucose 87 65 - 100 mg/dL    BUN 37 (H) 6 - 20 MG/DL    Creatinine 5.78 (H) 0.55 - 1.02 MG/DL    BUN/Creatinine ratio 6 (L) 12 - 20      GFR est AA 9 (L) >60 ml/min/1.73m2    GFR est non-AA 7 (L) >60 ml/min/1.73m2    Calcium 8.4 (L) 8.5 - 10.1 MG/DL    Bilirubin, total 0.9 0.2 - 1.0 MG/DL    ALT (SGPT) 35 12 - 78 U/L    AST (SGOT) 135 (H) 15 - 37 U/L    Alk.  phosphatase 146 (H) 45 - 117 U/L    Protein, total 6.4 6.4 - 8.2 g/dL    Albumin 1.8 (L) 3.5 - 5.0 g/dL    Globulin 4.6 (H) 2.0 - 4.0 g/dL    A-G Ratio 0.4 (L) 1.1 - 2.2     NUCLEATED RBC    Collection Time: 07/19/17  2:31 AM   Result Value Ref Range    NRBC 4.9 (H) 0  WBC    ABSOLUTE NRBC 0.47 (H) 0.00 - 0.01 K/uL    WBC CORRECTED FOR NR ADJUSTED FOR NUCLEATED RBC'S     GLUCOSE, POC    Collection Time: 07/19/17  7:46 AM   Result Value Ref Range    Glucose (POC) 78 65 - 100 mg/dL    Performed by [employer]    GLUCOSE, POC    Collection Time: 07/19/17  7:59 AM   Result Value Ref Range    Glucose (POC) 71 65 - 100 mg/dL    Performed by [employer]    GLUCOSE, POC    Collection Time: 07/19/17  8:15 AM   Result Value Ref Range    Glucose (POC) 95 65 - 100 mg/dL    Performed by [employer]        Medications reviewed  Current Facility-Administered Medications   Medication Dose Route Frequency    albumin human 25% (BUMINATE) solution 12.5 g  12.5 g IntraVENous TID PRN    albuterol (PROVENTIL HFA, VENTOLIN HFA, PROAIR HFA) inhaler 2 Puff  2 Puff Inhalation Q4H PRN    ferrous sulfate tablet 325 mg  325 mg Oral DAILY    pantoprazole (PROTONIX) tablet 40 mg  40 mg Oral BID    simvastatin (ZOCOR) tablet 20 mg  20 mg Oral QHS    SITagliptin (JANUVIA) tablet tab 25 mg  25 mg Oral DAILY    umeclidinium (INCRUSE ELLIPTA) 62.5 mcg/actuation  1 Puff Inhalation DAILY    doxycycline (VIBRAMYCIN) 100 mg in 0.9% sodium chloride (MBP/ADV) 100 mL  100 mg IntraVENous Q12H    loperamide (IMODIUM) 1 mg/5 mL oral solution 2 mg  2 mg Oral QID PRN    famotidine (PEPCID) tablet 20 mg  20 mg Oral DIALYSIS TUE, THU & SAT    QUEtiapine (SEROquel) tablet 12.5 mg  12.5 mg Oral BID    insulin lispro (HUMALOG) injection   SubCUTAneous AC&HS    guar gum (BENEFIBER) packet 1 Packet  4 g Oral TID    meropenem (MERREM) 500 mg in 0.9% sodium chloride (MBP/ADV) 50 mL  500 mg IntraVENous Q24H    sevelamer (RENAGEL) tablet 1,600 mg  1,600 mg Oral TID WITH MEALS    sevelamer (RENAGEL) tablet 800 mg  800 mg Oral BID PRN    sodium chloride (NS) flush 5-10 mL  5-10 mL IntraVENous PRN    albuterol-ipratropium (DUO-NEB) 2.5 MG-0.5 MG/3 ML  3 mL Nebulization Q6H PRN    glucose chewable tablet 16 g  4 Tab Oral PRN    dextrose (D50W) injection syrg 12.5-25 g  12.5-25 g IntraVENous PRN    glucagon (GLUCAGEN) injection 1 mg  1 mg IntraMUSCular PRN    acetaminophen (TYLENOL) tablet 650 mg  650 mg Oral Q4H PRN    diphenhydrAMINE (BENADRYL) capsule 25 mg  25 mg Oral Q4H PRN    ondansetron (ZOFRAN) injection 4 mg  4 mg IntraVENous Q4H PRN    fluticasone-vilanterol (BREO ELLIPTA) 100mcg-25mcg/puff  1 Puff Inhalation DAILY    sodium hypochlorite (HALF STRENGTH DAKIN'S) 0.25% irrigation (bottle)   Topical DAILY       Care Plan discussed with: Patient and Nurse and Pulm  Total time spent with patient: 30 minutes.     Jody Mueller MD

## 2017-07-19 NOTE — PROGRESS NOTES
[facility]     Name: Luis Manuel Sesay MRN: [id number]   : 1945 Hospital: [address]   Date: 2017        Impression Plan   1. Septic shock--off pressors. 2. 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 Severe ischemia of the right leg; s/p Right femoral Thrombectomy ()  4. ESRD - On HD. 5. Abnormal CT chest- chronic scarring RLL with chronic right pleural effusion  6. Ischemic colitis per CT  7. Diarrhea requiring rectal tube--C. Diff neg  8. GI bleed- hgb stable  9. PVD  10. Leukocytosis  11. Hx of COPD   · Getting Hd today and plan to remove 2.3 liters. · Cultures from wound reviewed. MSSA, Acinetobacter and pseudomonas. D/c vanc; continue Merrem. No need for flagyl for anerobes. C.diff negative. · Hold AC for now- restart per surgical team  · Transfusion indication 8.0 and below. · Famotidine  · Renal diet. Radiology  ( personally reviewed) CXR: right pleural effusion and scarring- stable for atleast 2 years   ABG No results for input(s): PHI, PO2I, PCO2I in the last 72 hours. Subjective     Overnight events:    Remained off pressors. No complaints. S/p Right femoral thrombectomy. Past Medical History:   Diagnosis Date    CAD (coronary artery disease)     Chronic obstructive pulmonary disease (HCC)     emphysema    Chronic respiratory failure with hypoxia (HCC)     Diastolic CHF, chronic (HCC)     DM type 2 causing renal disease ([facility]     ESRD (end stage renal disease) (HCC)     GERD (gastroesophageal reflux disease)     Hypertension     Hypothyroidism       Past Surgical History:   Procedure Laterality Date    COLONOSCOPY N/A 2017    COLONOSCOPY performed by Zoya Vidal MD at [facility] ENDOSCOPY    HX AORTIC VALVE REPLACEMENT      HX CORONARY ARTERY BYPASS GRAFT      HX TUBAL LIGATION        Prior to Admission medications    Medication Sig Start Date End Date Taking?  Authorizing Provider   SITagliptin (JANUVIA) 25 mg tablet Take 25 mg by mouth daily. Yes Debo Villatoro MD   sevelamer carbonate (RENVELA) 800 mg tab tab Take 1,600 mg by mouth three (3) times daily. 1 tab bid with meals   Yes Debo Villatoro MD   simvastatin (ZOCOR) 20 mg tablet Take 20 mg by mouth nightly. Yes Historical Provider   trimethoprim-sulfamethoxazole (BACTRIM DS) 160-800 mg per tablet Take 1 Tab by mouth two (2) times a day. 6/12/17  Yes Historical Provider   albuterol (PROAIR HFA) 90 mcg/actuation inhaler Take 2 Puffs by inhalation every four (4) hours as needed for Wheezing. Yes Historical Provider   sevelamer (RENAGEL) 800 mg tablet Take 800 mg by mouth daily. With snacks   Yes Historical Provider   isosorbide mononitrate ER (IMDUR) 30 mg tablet Take 30 mg by mouth daily. Yes Debo Villatoro MD   ferrous sulfate 325 mg (65 mg iron) tablet Take 325 mg by mouth daily. Yes Historical Provider   metoprolol tartrate (LOPRESSOR) 25 mg tablet Take 12.5 mg by mouth two (2) times a day. Yes Historical Provider   tiotropium (SPIRIVA) 18 mcg inhalation capsule Take 1 Cap by inhalation daily. Yes Historical Provider   fluticasone-salmeterol (ADVAIR DISKUS) 250-50 mcg/dose diskus inhaler Take 1 Puff by inhalation every twelve (12) hours. Yes Historical Provider   pantoprazole (PROTONIX) 40 mg tablet Take 40 mg by mouth two (2) times a day. Yes Historical Provider   amLODIPine (NORVASC) 10 mg tablet Take 10 mg by mouth daily.    Yes Historical Provider     Current Facility-Administered Medications   Medication Dose Route Frequency    ferrous sulfate tablet 325 mg  325 mg Oral DAILY    pantoprazole (PROTONIX) tablet 40 mg  40 mg Oral BID    simvastatin (ZOCOR) tablet 20 mg  20 mg Oral QHS    SITagliptin (JANUVIA) tablet tab 25 mg  25 mg Oral DAILY    umeclidinium (INCRUSE ELLIPTA) 62.5 mcg/actuation  1 Puff Inhalation DAILY    doxycycline (VIBRAMYCIN) 100 mg in 0.9% sodium chloride (MBP/ADV) 100 mL  100 mg IntraVENous Q12H    famotidine (PEPCID) tablet 20 mg  20 mg Oral DIALYSIS TUE, THU & SAT    QUEtiapine (SEROquel) tablet 12.5 mg  12.5 mg Oral BID    insulin lispro (HUMALOG) injection   SubCUTAneous AC&HS    guar gum (BENEFIBER) packet 1 Packet  4 g Oral TID    meropenem (MERREM) 500 mg in 0.9% sodium chloride (MBP/ADV) 50 mL  500 mg IntraVENous Q24H    sevelamer (RENAGEL) tablet 1,600 mg  1,600 mg Oral TID WITH MEALS    fluticasone-vilanterol (BREO ELLIPTA) 100mcg-25mcg/puff  1 Puff Inhalation DAILY    sodium hypochlorite (HALF STRENGTH DAKIN'S) 0.25% irrigation (bottle)   Topical DAILY     No Known Allergies   Social History   Substance Use Topics    Smoking status: Former Smoker     Packs/day: 0.50     Quit date: 4/29/2015    Smokeless tobacco: Never Used    Alcohol use No      Family History   Problem Relation Age of Onset    Heart Disease Mother     Stroke Mother           Laboratory: I have personally reviewed the critical care flowsheet and labs.      Recent Labs      07/19/17   0231  07/18/17   1225  07/18/17   0402   WBC  9.6  13.6*  13.4*   HGB  8.8*  8.6*  9.5*   HCT  27.3*  26.4*  28.1*   PLT  167  197  194     Recent Labs      07/19/17   0231  07/18/17   1225  07/18/17   0402  07/17/17   0324   NA  139  138  135*  142   K  4.6  4.0  5.0  4.0   CL  106  105  105  108   CO2  18*  18*  19*  18*   GLU  87  103*  101*  154*   BUN  37*  35*  36*  47*   CREA  5.78*  5.50*  5.39*  6.32*   CA  8.4*  8.6  8.5  8.4*   MG  1.9   --   2.0  2.1   ALB  1.8*   --   1.9*  2.0*   SGOT  135*   --   147*  125*   ALT  35   --   46  46       Objective:     Mode Rate Tidal Volume Pressure FiO2 PEEP                    Vital Signs:     TMAX(24)      Intake/Output:   Last shift:         Last 3 shifts:  [id number]    Intake/Output Summary (Last 24 hours) at 07/19/17 0959  Last data filed at 07/19/17 0000   Gross per 24 hour   Intake             2450 ml   Output              450 ml   Net             2000 ml     EXAM:   GENERAL: chronically ill appearing in no distress, awake, alert HEENT:  PERRL, EOMI, no alar flaring or epistaxis, oral mucosa moist without cyanosis, NECK:  no jugular vein distention, no retractions, no thyromegaly or masses, LUNGS: diminished, no w/r/r, HEART:  Regular rate and rhythm with no MGR; no edema is present, ABDOMEN:  soft with no tenderness, bowel sounds present, EXTREMITIES:  Left toe dressing c/d/i.  No edema, SKIN:  no jaundice or ecchymosis and NEUROLOGIC:  grossly non-focal      Debbie Stover MD  [facility] [address]

## 2017-07-19 NOTE — PROGRESS NOTES
I am continuing to follow pt for discharge needs. Palliative medicine has been consulted for goals of care. PT & OT will be consulted once pt is more stable.   Meet Rodríguez

## 2017-07-19 NOTE — PROGRESS NOTES
Bedside and Verbal shift change report given to Waqar Deshpande RN (oncoming nurse) by ANA Campos (offgoing nurse). Report given with SBAR, Kardex, OR Summary, Intake/Output, MAR and Recent Results.

## 2017-07-19 NOTE — PROGRESS NOTES
Vascular  BP around 90  Both feet cool but right leg warm to the ankle  Leg feels \"much better\"  hgb 8.8 from anemia of chronic disease and blood loss anemia  OK from my standpoint to exit the ICU

## 2017-07-20 NOTE — PROGRESS NOTES
36 - Dr. Fiordaliza Delcid notified of patient being unresponsive, not breathing, and no pulse. Also notified about patient's code status. Received order to call a code. [employer] called. 9031 - Code charting completed by Mary Solorio RN    5291 - Code completed. Dr. Fiordaliza Delcid called and notified. Family notified. Pastoral Care notified. [facility] notified.

## 2017-07-20 NOTE — OP NOTES
[facility]   [address] [facility], [address]   OP NOTE       Name:  Rebel Farmer   MR#:  [id number]   :  1945   Account #:  [de-identified]    Surgery Date:  2017   Date of Adm:  2017       PREOPERATIVE DIAGNOSIS:   Peripheral vascular disease with tissue   loss. POSTOPERATIVE DIAGNOSIS:   Peripheral vascular disease with   tissue loss. PROCEDURES PERFORMED:    1. Aortogram with left leg runoff. 2. Selective catheterization of third order artery. 3. Percutaneous transluminal angioplasty of left common iliac artery. SURGEON:  Valdo Zamora. ESTIMATED BLOOD LOSS: 0    SPECIMENS REMOVED: 0    ANESTHESIA:   Local anesthesia. INDICATIONS FOR PROCEDURE:  The patient is an elderly female   with multiple medical problems, tissue loss of her left foot. She had a   ray amputation and the decision was made to take her to the cath lab   for angiographic evaluation. TECHNICAL DETAILS:  The patient was taken to the cath lab. Once a   suitable level of anesthesia was obtained, she was prepped and   draped in the typical sterile fashion. Percutaneous access of the right   common femoral artery was achieved and a wire and sheath were   placed. A wire and catheter were introduced in the abdominal aorta   and an abdominal aortogram was performed with pelvic angiogram.     out the left common iliac, external iliac, and common femoral artery    left lower extremity runoff was performed. The patient was   systemically heparinized and a long sheath manipulated into the left   external iliac artery. Multiple attempts to cross the lesion in the   superficial femoral artery were unsuccessful. The sheath was   withdrawn. A 6 mm angioplasty balloon was used to dilate the   proximal common iliac artery, with good technical results. She   tolerated the procedure well. Sponge and instrument counts were   correct x2.   The patient was transferred to the recovery area in good   condition. FINDINGS:     1. Aorto-pelvic angiogram:  Aorto-pelvic angiogram shows a patent but   disease abdominal aorta. Common iliac, external iliac, and internal   iliacs are diseased but patent bilaterally. There is a high grade 80%   focal stenosis present in the proximal common iliac artery. 2. Left lower extremity runoff:  Left lower extremity runoff shows a   patent common, superficial femoral, and profunda femoral artery. Wilson's canal and superficial femoral artery becomes occluded. There   is reconstitution of the above-knee popliteal artery with multiple vessel   runoff to the foot.           Stevie Ganser, MD MW / [email or web address].[email or web address]   D:  07/14/2017   17:01   T:  07/20/2017   16:17   Job #:  [id number]

## 2017-07-20 NOTE — DISCHARGE SUMMARY
Physician Discharge Summary     Patient ID:    Adrien Noguera  [id number]  12 y.o.  1945  Zulma Schilder, MD    Admit date: 2017  Discharge date and time:  17  Admission Diagnoses: Sepsis ([facility] Gas Gangrene Left Foot;GI bleed;SEVERELY ISCHE*  Chronic Diagnoses:    Problem List as of 2017  Date Reviewed: 2015          Codes Class Noted - Resolved    Sepsis ([facility] ICD-10-CM: A41.9  ICD-9-CM: 038.9, 995.91  2017 - Present        Shock ([facility] ICD-10-CM: R57.9  ICD-9-CM: 785.50  2017 - Present        Diarrhea ICD-10-CM: R19.7  ICD-9-CM: 787.91  2017 - Present        Hypothyroidism ICD-10-CM: E03.9  ICD-9-CM: 244.9  Unknown - Present        GERD (gastroesophageal reflux disease) ICD-10-CM: K21.9  ICD-9-CM: 530.81  Unknown - Present        Chronic obstructive pulmonary disease ([facility] ICD-10-CM: J44.9  ICD-9-CM: 307  Unknown - Present    Overview Signed 2017  3:31 PM by Monika Parisi MD     emphysema             DM foot ulcer ([facility] ICD-10-CM: E11.621, L97.509  ICD-9-CM: 250.80, 707.15  2017 - Present        Leukocytosis ICD-10-CM: S24.529  ICD-9-CM: 288.60  2017 - Present        Lethargy ICD-10-CM: R53.83  ICD-9-CM: 780.79  2017 - Present        Vomiting ICD-10-CM: R11.10  ICD-9-CM: 787.03  2017 - Present        FTT (failure to thrive) in adult ICD-10-CM: R62.7  ICD-9-CM: 783.7  2017 - Present        Debilitated patient ICD-10-CM: R53.81  ICD-9-CM: 799.3  2017 - Present        Anemia ICD-10-CM: D64.9  ICD-9-CM: 285.9  2015 - Present        Left kidney mass ICD-10-CM: N28.89  ICD-9-CM: 593.9  2015 - Present        ESRD (end stage renal disease) ([facility] ICD-10-CM: N18.6  ICD-9-CM: 585.6  2015 - Present        Diastolic CHF, chronic (HCC) ICD-10-CM: I50.32  ICD-9-CM: 428.32, 428.0  Unknown - Present        Chronic respiratory failure with hypoxia (HCC) ICD-10-CM: J96.11  ICD-9-CM: 518.83, 799.02  Unknown - Present        DM type 2 causing renal disease ([place] [address] ICD-10-CM: E11.29  ICD-9-CM: 250.40  Unknown - Present        HTN (hypertension) ICD-10-CM: I10  ICD-9-CM: 401.9  2012 - Present        Coronary atherosclerosis of native coronary artery ICD-10-CM: I25.10  ICD-9-CM: 414.01  2012 - Present        Other and unspecified hyperlipidemia ICD-10-CM: E78.5  ICD-9-CM: 272.4  2012 - Present        RESOLVED: Hypertension ICD-10-CM: I10  ICD-9-CM: 401.9  Unknown - 2017        RESOLVED: ARF (acute renal failure) ([place] [address] ICD-10-CM: N17.9  ICD-9-CM: 584.9  2015 - 2015        RESOLVED: Ascites ICD-10-CM: R18.8  ICD-9-CM: 789.59  2015 - 2017        RESOLVED: Pleural effusion on right ICD-10-CM: J90  ICD-9-CM: 511.9  2015 - 2017        RESOLVED: Cystitis ICD-10-CM: N30.90  ICD-9-CM: 595.9  2015 - 2017        RESOLVED: Abdominal pain ICD-10-CM: R10.9  ICD-9-CM: 789.00  2015 - 2017        RESOLVED: CAD (coronary artery disease) ICD-10-CM: I25.10  ICD-9-CM: 414.00  Unknown - 2017        RESOLVED: Chronic diastolic heart failure ([facility] ICD-10-CM: I50.32  ICD-9-CM: 428.32  2013 - 2017        RESOLVED: Altered mental status ICD-10-CM: R41.82  ICD-9-CM: 780.97  1/3/2013 - 2015        RESOLVED: DM (diabetes mellitus) ([facility] ICD-10-CM: E11.9  ICD-9-CM: 250.00  2012 - 2015        RESOLVED: Chronic airway obstruction, not elsewhere classified ICD-10-CM: J44.9  ICD-9-CM: 514  2012 - 2017        RESOLVED: Tobacco abuse ICD-10-CM: Z72.0  ICD-9-CM: 305.1  2012 - 2017            Disposition:    CONSULTATIONS: Cardiology and Pulmonary/Intensive care and Vascular surg and podiatry    Significant Diagnostic Studies:   Recent Labs      17   0231  17   1225   WBC  9.6  13.6*   HGB  8.8*  8.6*   HCT  27.3*  26.4*   PLT  167  197     Recent Labs      17   0231  17   1225  17   0402   NA  139  138  135*   K  4.6  4.0  5.0 CL  106  105  105   CO2  18*  18*  19*   BUN  37*  35*  36*   CREA  5.78*  5.50*  5.39*   GLU  87  103*  101*   CA  8.4*  8.6  8.5   MG  1.9   --   2.0     Recent Labs      17   0231  17   0402   SGOT  135*  147*   ALT  35  46   AP  146*  170*   TBILI  0.9  1.1*   TP  6.4  6.9   ALB  1.8*  1.9*   GLOB  4.6*  5.0*     Lab Results   Component Value Date/Time    TSH 5.51 2017 11:30 PM       HOSPITAL COURSE & TREATMENT RENDERED:   1. Found with no vital signs later in the day of 17. Pronounced  by hospitalist.  Also, see today's progress note:      Daily Progress Note: 2017  Corey Ontiveros MD         Assessment/Plan:   Severe Sepsis / Shock / Leukocytosis - POA, ischemic colitis and DM foot ulcer/gas gangrene/vascular gangrene. Sepsis protocol.  Empiric Abx (Vanco, l, zosyn) - Vanc now DCed and Merrem on but some staph on culture and pharm advised G+ coverage - Doxy added . Off Anant      DM foot ulcer/Gas gangrene left toe/left hallux and 1st interspace gas gangrene/vascular gangrrene - POA. continue antibiotics.  Podiatry did left 1st Ray Amputation . Александр Celestin, did Right Fem thrombectomy and endarterectomy AM .       Diarrhea - likely from Ischemic Colitis - Immodium, Guar gum, Banatrol      FTT (failure to thrive) in adult / Lethargy / Debilitated patient / Vomiting - POA, worse than baseline due to sepsis.  Fall precautions.  Will need PT/OT eval when better.  Palliative consulted      Chronic diastolic heart failure -   Hydrate as needed, and allow HD to handle final fluid volume.      ESRD (end stage renal disease) - Consult renal to handle HD.  Renal diet when eating. Continue sevelamer when eating.      Anemia - POA presumed related to ESRD.  She takes iron, resume when eating      Coronary atherosclerosis of native coronary artery / HTN (hypertension) - No acute issues. Monitor tele. Appreciate Cardiology consult.  Resume IMDUR, metoprolol and norvasc when able      Chronic respiratory failure with hypoxia / Chronic airway obstruction, not elsewhere classified/Chronic scarring RLL with chronic right pleural effusion - Per Pulmonary. Continue Advair, and prn duonebs.  Oxygen as needed.      Hyperlipidemia - Continue zocor. No evidence of benefit in Pt with ESRD.  I would stop it, and hold for now      Hypothyroidism - No longer listing synthroid. Check TSH.     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 Prior A1c was 5.7.       DJD / Neuropathy - Prn tylenol     Ischemic Colitis: Sigmoid done 7/13 by Dr Josephine Butler - per GI.        Subjective:    71 y.o.   female who presented to the Emergency Department complaining of fatigue and diarrhea.  She provides minimal hx.  Per family, diarrhea for >4weeks.  Lower abd pain for 2 days, with bilious vomiting last night  L great toe/Foot infection, not improving on Bactrim x3 weeks.  Tolerating HD up to yesterday, but today in septic shock.  Falling at home. We will admit her for management (dr Anni Werner)     7/13: She reports she is feeling some better. Little pain at this time. Underwent left 1st ray amputation today by podiatry for gas gangrene. Sigmoid by Dr Josephine Butler on 7/13 revealed ischemic colitis. Requiring anant for BP support.      7/14:  Reports a little foot pain. No further diarrhea she reports. No ab pain. Still on Anant but weaning. Planned angio LEs today per vascular surg.      7/15: Reports some pain in her foot. She is still on Anant to keep SBP >90. Nurses report no pulse palpable in her right foot and foot is cool to touch. Palliative care was consulted. Had angioplasty of left iliac yesterday.      7/16: Nurses report she pulled her central line out last night. More confused at night. Was not able to complete dialysis due to clotting. BP has been ok off Anant.      7/17:  Remains confused.   Still having loose stools - C diff neg.  Attempting dialysis again today. Vanc DCed and Merrem on.      :  Confused off and on. Loose stools somewhat better per pt. Completed HD yesterday and planned again tomorrow. Now on Doxy and Merrem.      : Less leg pain. She reports no new complaints. Still confused off and on. Dialysis today.       Review of Systems:   A comprehensive review of systems was negative except for that written in the HPI.     Objective:   Physical Exam:           Visit Vitals    /41    Pulse (!) 102    Temp 98.2 °F (36.8 °C)    Resp 29    Ht 5' 6\" (1.676 m)    Wt 70.6 kg (155 lb 10.3 oz)    SpO2 (!) 88%    BMI 25.12 kg/m2    O2 Flow Rate (L/min): 2 l/min O2 Device: Nasal cannula     Temp (24hrs), Av.9 °F (36.6 °C), Min:97.6 °F (36.4 °C), Max:98.2 °F (36.8 °C)         1901 -  0700  In: 2550 [I.V.:2550]  Out: 525 [Drains:75]     General:  Less Alert today, cooperative, no distress, appears stated age. Head:  Normocephalic, without obvious abnormality, atraumatic. Eyes:  Conjunctivae/corneas clear. EOMs intact. Throat: Lips, mucosa, and tongue moist..   Neck: Supple, symmetrical, trachea midline, no adenopathy, thyroid: no enlargement/tenderness/nodules, no carotid bruit and no JVD. Lungs:   Scattered rhonchi but otherwise clear to auscultation bilaterally. Heart:  Regular rate and rhythm, S1, S2 normal, no murmur, click, rub or gallop. Abdomen:   Soft, non-tender. Bowel sounds normal. No masses,  No organomegaly. Extremities: no cyanosis or edema. No calf tenderness or cords. There is a dialysis shunt in the L-upper arm. Dressing dry over left foot. Pulses: No pulses palpable in the LEs   Skin:  turgor normal.    Neurologic:  Alert and oriented X 1-2. No cogwheeling or rigidity. Gait not tested at this time.     Gross motor of extremities normal.         Signed:  Airam Oshea MD  2017  11:18 AM

## 2017-07-20 NOTE — PROGRESS NOTES
Primary Nurse Sunita Freed and Micah Lira RN performed a dual skin assessment on this patient Impairment noted- see wound doc flow sheet. Stage 2 pressure ulcer on sacral area around left and right buttocks. Various scabs on left and right lower extremities. Surgical site with dry dressing right groin area. Wound with dry dressing on left foot. Scar midline chest. Heels and elbows intact/blanchable.   Shamir score is 13

## 2017-07-20 NOTE — PROGRESS NOTES
I was called to see Ms. Alba due to patient's death. On my arrival, She was lying in bed; there was no response to verbal or painful stimuli; pupils were fixed and dilated; there was no pulse, no respirations and no heart sounds. Guillaume Garcia was pronounced dead at 12:10 AM.  Initially a CODE BLUE was called for lack of pulse, however, we were able to confirm that the patient is indeed DO NOT RESUSCITATE and so ACLS protocols were not started.   Death Certificate and Discharge Summary per MD Cyrus Swenson MD  12:15 AM, 7/20/2017

## 2017-07-20 NOTE — PROGRESS NOTES
Spiritual Care Assessment/Progress Notes    David Estrada [id number]  [id number]    1945  71 y.o.  female    Patient Telephone Number: [phone] (home)   Yazidism Affiliation: Hay Vital   Language: English   Extended Emergency Contact Information  Primary Emergency Contact: [address] Phone: [phone]  Mobile Phone: [phone]  Relation: Child   Patient Active Problem List    Diagnosis Date Noted    Sepsis ([facility] 07/12/2017    Shock ([address] 07/12/2017    Diarrhea 07/12/2017    DM foot ulcer ([address] 07/12/2017    Leukocytosis 07/12/2017    Lethargy 07/12/2017    Vomiting 07/12/2017    FTT (failure to thrive) in adult 07/12/2017    Debilitated patient 07/12/2017    Hypothyroidism     GERD (gastroesophageal reflux disease)     Chronic obstructive pulmonary disease ([address]     Anemia 06/29/2015    Left kidney mass 06/29/2015    ESRD (end stage renal disease) ([facility] [address] [facility] 05/48/9957    Diastolic CHF, chronic (HCC)     Chronic respiratory failure with hypoxia ([facility] [address] [facility]     DM type 2 causing renal disease ([facility]     HTN (hypertension) 12/27/2012    Coronary atherosclerosis of native coronary artery 12/27/2012    Other and unspecified hyperlipidemia 12/27/2012        Date: 7/20/2017       Level of Yazidism/Spiritual Activity:  []         Involved in janis tradition/spiritual practice    []         Not involved in janis tradition/spiritual practice  []         Spiritually oriented    []         Claims no spiritual orientation    []         seeking spiritual identity  []         Feels alienated from Congregational practice/tradition  []         Feels angry about Congregational practice/tradition  []         Spirituality/Congregational tradition a resource for coping at this time.   [x]         Not able to assess due to medical condition    Services Provided Today:  []         crisis intervention    []         reading Scriptures  []         spiritual assessment    [x]         prayer  [] empathic listening/emotional support  []         rites and rituals (cite in comments)  []         life review     []         Jewish support  []         theological development   []         advocacy  []         ethical dialog     []         blessing  [x]         bereavement support    [x]         support to family  []         anticipatory grief support   []         help with AMD  []         spiritual guidance    []         meditation      Spiritual Care Needs  [x]         Emotional Support  []         Spiritual/Temple Care  []         Loss/Adjustment  []         Advocacy/Referral                /Ethics  []         No needs expressed at               this time  []         Other: (note in               comments)  [address]  []         Follow up visits with               pt/family  []         Provide materials  []         Schedule sacraments  []         Contact Community               Clergy  []         Follow up as needed  [x]         Other: (note in               comments)     Comments: I was paged after Ms. Mathew Mccollum had . Her son was at the bedside when I arrived. I provided pastoral support as he shared with me about his mother's life and her dedication to him and his brother. I said a prayer of commendation as well and gave him a [email or web address] brochure. I also assured him of our ongoing availability if needed. Spiritual Care Services remains available if needed. Rev. Joanna Sanford M.Div, [facility]

## 2017-07-21 NOTE — OP NOTES
[facility]   [address] [facility], [address]   OP NOTE       Name:  Dixon Beltran   MR#:  [id number]   :  1945   Account #:  [de-identified]    Surgery Date:  2017   Date of Adm:  2017       PREOPERATIVE DIAGNOSIS:   Occluded right femoral artery,   secondary to severe peripheral arterial disease and Perclose device. POSTOPERATIVE DIAGNOSIS:   Occluded right femoral artery,   secondary to severe peripheral arterial disease and Perclose device. PROCEDURES PERFORMED:    1. Groin exploration. 2. Femoral thrombectomy. 3. Femoral endarterectomy with Dacron patch. 4. Removal of Perclose Devise. SURGEON:  Paolo. ESTIMATED BLOOD LOSS:  250 mL. SPECIMENS REMOVED:  Femoral plaque. ANESTHESIA:   Local with 20 mL of 1% plain xylocaine with sodium   bicarbonate and with modified anesthesia care from Anesthesia. COMPLICATIONS:  None. INDICATIONS:  This is a 71-year-old woman who has a left iliac   angioplasty in an attempt to try to save her left foot. She did well. That   was done on Friday the . On the , following dialysis, she had   very low blood pressure. All of the sudden, she had difficulty moving   her right foot, but because of her instability and her rather severe   chronic, life-threatening issues in the ICU we could not take her to the   operating room. The following morning, she was more stable. It was   opted to proceed with a simple attempt to save her leg. She had no   motion in her foot, but her foot appeared viable. DESCRIPTION OF PROCEDURE:  After being seen by Anesthesia,   she was taken to the operating room and was prepped and draped. Thorough timeout was done as to the nature of the procedure, the   patient, antibiotics, beta-blockers, etc.  Local was used to instill around   the femoral triangle, up into the lower abdomen, both superficial and   deep.   A total of 20 mL were used. A longitudinal incision was made   above and below her stick site for her angiogram done on Friday the   14th. Femoral, distal external iliac, superficial femoral, and profunda   femoral vessels were dissected free. An arteriotomy was made from   the proximal SFA up to and past the level of the Perclose. A huge   endarterectomy was performed, both of the orifice of the profunda, the   proximal SFA, and all the common femoral *plaque** from the external iliac,   and a Sharri catheter was placed, both a 4 and 5, and vigorous   arterial inflow was obtained. The vessel was clamped. This is where   most of the blood loss occurred. A Dacron patch was then sutured   circumferentially with some 5-0 Prolene. Flow was then restored to the   profunda and its branches initially, and then down the leg. Doppler   signals were normal.  Hemostasis was obtained with 20 mg of protamine   sulfate, as the patient had been given 5000 units prior to clamping the   vessel. Fibrillar was placed on the patch. Multiple small bleeding   vessels were clipped. The wound was then closed in 2 layers of   running 2-0 Vicryl and with subcuticular Monocryl. Steri-Strips were   applied and the wound dressed. The patient was taken to the recovery   room. We are unaware as to whether this will be enough to save her   foot, but no further aggressive maneuvers will be considered, based on   her general health.           MD NJ Salcido / BRADEN   D:  07/18/2017   11:51   T:  07/21/2017   15:56   Job #:  [id number]

## 2022-03-14 NOTE — DIALYSIS
[facility]                         [phone]  Vitals Pre Post Assessment Pre Post   /42  LOC Alert/oriented X 1    HR 66  Lungs Clear/diminished    Temp 97.6  Cardiac RRR    Resp 20  Skin Warm/dry/intact    Weight   Edema None noted       Pain Denies      Orders   Duration: Start: 2250 End:  Total:    Dialyzer: Revaclear   K Bath: 3   Ca Bath: 2.5   Na / Bicarb: 140/35   Target Fluid Removal: 1000     Access   Type & Location: LUE AVF   Comments:          + thrill, + bruit, no redness/tenderness/edema at the insertion site. Cannulated easily with (2) 15G HD needles                       Labs   Hep B status / date: >150 3/9/17   Obtained/Reviewed  Critical Results Called        Meds Given   Name Dose Route                    Total Liters Process:    Net Fluid Removed:       Comments   Time Out Done: 1186   Primary Nurse Rpt Pre: eBssie Mace RN   Primary Nurse Rpt Post: Bessie Mace RN   Pt Education: Deferred due to mental status   Care Plan: Pt will tolerate HD with hemodynamic stability   Tx Summary: HD initiated at 2250 however arterial pressures are immediately problematic with transducer readings widely varying from -10 to >-320. On assessment the patient is found to have a large clot extending from the arterial needle into the bloodline and the arterial cannot be aspirated. Arterial needle removed and new needle placed, bloodlines replaced and HD attempted again with same outcome. Dr Rudy Kiran paged and informed of patient's current lab results and assessment. HD cancelled for tonight. Per Dr Rudy Kiran patient will be reevaluated tomorrow and scheduled for declot of the AVF. yes

## (undated) DEVICE — DEVON™ KNEE AND BODY STRAP 60" X 3" (1.5 M X 7.6 CM): Brand: DEVON

## (undated) DEVICE — 3M™ TEGADERM™ TRANSPARENT FILM DRESSING FRAME STYLE, 1626W, 4 IN X 4-3/4 IN (10 CM X 12 CM), 50/CT 4CT/CASE: Brand: 3M™ TEGADERM™

## (undated) DEVICE — CURITY PLAIN PACKING STRIP: Brand: CURITY

## (undated) DEVICE — AGENT HEMSTAT W4XL4IN OXIDIZED REGENERATED CELOS ABSRB SFT

## (undated) DEVICE — NEEDLE HYPO 25GA L1.5IN BVL ORIENTED ECLIPSE

## (undated) DEVICE — COVER LT HNDL BLU PLAS

## (undated) DEVICE — STERILE POLYISOPRENE POWDER-FREE SURGICAL GLOVES WITH EMOLLIENT COATING: Brand: PROTEXIS

## (undated) DEVICE — SUT PROL 3-0 30IN SH BLU --

## (undated) DEVICE — TELFA ADHESIVE ISLAND DRESSING: Brand: TELFA

## (undated) DEVICE — SUTURE VCRL SZ 2-0 L36IN ABSRB UD L40MM CT 1/2 CIR J957H

## (undated) DEVICE — DRAPE,EXTREMITY,89X128,STERILE: Brand: MEDLINE

## (undated) DEVICE — SOLUTION IV 1000ML 0.9% SOD CHL

## (undated) DEVICE — MASTISOL ADHESIVE LIQ 2/3ML

## (undated) DEVICE — BASIN EMESIS 500CC ROSE 250/CS 60/PLT: Brand: MEDEGEN MEDICAL PRODUCTS, LLC

## (undated) DEVICE — (D)PREP SKN CHLRAPRP APPL 26ML -- CONVERT TO ITEM 371833

## (undated) DEVICE — VASCULAR-RICHMOND-LF: Brand: MEDLINE INDUSTRIES, INC.

## (undated) DEVICE — BLADE SAW OSC COARSE 25X9MM --

## (undated) DEVICE — TRAY CATH OD16FR SIL URIN M STATLOK STBL DEV SURSTP

## (undated) DEVICE — KENDALL RADIOLUCENT FOAM MONITORING ELECTRODE -RECTANGULAR SHAPE: Brand: KENDALL

## (undated) DEVICE — KIT COLON W/ 1.1OZ LUB AND 2 END

## (undated) DEVICE — ZIMMER® STERILE DISPOSABLE TOURNIQUET CUFF WITH PROTECTIVE SLEEVE AND PLC, DUAL PORT, SINGLE BLADDER, 18 IN. (46 CM)

## (undated) DEVICE — DRAPE,REIN 53X77,STERILE: Brand: MEDLINE

## (undated) DEVICE — SOLUTION IV 500ML 0.9% SOD CHL FLX CONT

## (undated) DEVICE — ROCKER SWITCH PENCIL BLADE ELECTRODE, HOLSTER: Brand: EDGE

## (undated) DEVICE — (D)STRIP SKN CLSR 0.5X4IN WHT --

## (undated) DEVICE — Device

## (undated) DEVICE — STERILE POLYISOPRENE POWDER-FREE SURGICAL GLOVES: Brand: PROTEXIS

## (undated) DEVICE — AMC/4 ARTERIAL NEEDLE – 18GA X 2.75” (7CM): Brand: ARTERIAL NEEDLE

## (undated) DEVICE — SET ADMIN 16ML TBNG L100IN 2 Y INJ SITE IV PIGGY BK DISP

## (undated) DEVICE — 1200 GUARD II KIT W/5MM TUBE W/O VAC TUBE: Brand: GUARDIAN

## (undated) DEVICE — (D)SYR 10ML 1/5ML GRAD NSAF -- PKGING CHANGE USE ITEM 338027

## (undated) DEVICE — ABDOMINAL PAD: Brand: DERMACEA

## (undated) DEVICE — SUTURE VCRL SZ 2-0 L27IN ABSRB UD L26MM SH 1/2 CIR J417H

## (undated) DEVICE — INFECTION CONTROL KIT SYS

## (undated) DEVICE — SUTURE PROL 5-0 L18IN NONABSORBABLE BLU RB-2 L13MM 1/2 CIR 8713H

## (undated) DEVICE — SUT PROL 6-0 24IN BV1 DA BLU --

## (undated) DEVICE — SYRINGE 50ML E/T

## (undated) DEVICE — FOGARTY ARTERIAL EMBOLECTOMY CATHETER 4F 80CM: Brand: FOGARTY

## (undated) DEVICE — SUTURE VCRL 2 0 L36IN ABSRB VLT CT L40MM 1 2 CIR TAPERPOINT J357H

## (undated) DEVICE — NDL FLTR TIP 5 MIC 18GX1.5IN --

## (undated) DEVICE — 3000CC GUARDIAN II: Brand: GUARDIAN

## (undated) DEVICE — DEVICE INFL 20ML L13IN 30ATM CLR POLYCARB TB PRTBL DGT

## (undated) DEVICE — SYR 5ML 1/5 GRAD LL NSAF LF --

## (undated) DEVICE — CANNULA CUSH AD W/ 14FT TBG

## (undated) DEVICE — FOGARTY EMBOLECTOMY CATHETER: Brand: FOGARTY

## (undated) DEVICE — SOLIDIFIER MEDC 1200ML -- CONVERT TO 356117

## (undated) DEVICE — CULTURETTE SGL EVAC TUBE PALL -- 100/CA

## (undated) DEVICE — DEVICE TRNSF SPIK STL 2008S] MICROTEK MEDICAL INC]

## (undated) DEVICE — ASTOUND STANDARD SURGICAL GOWN, XL: Brand: CONVERTORS

## (undated) DEVICE — DRAPE XR C ARM 41X74IN LF --

## (undated) DEVICE — GAUZE SPONGES,12 PLY: Brand: CURITY

## (undated) DEVICE — BAG BELONG PT PERS CLEAR HANDL

## (undated) DEVICE — HOOK LOCK LATEX FREE ELASTIC BANDAGE 4INX5YD

## (undated) DEVICE — CATH IV AUTOGRD BC BLU 22GA 25 -- INSYTE

## (undated) DEVICE — BITEBLOCK ENDOSCP 60FR MAXI WHT POLYETH STURDY W/ VELC WVN

## (undated) DEVICE — TRAY PREP DRY W/ PREM GLV 2 APPL 6 SPNG 2 UNDPD 1 OVERWRAP

## (undated) DEVICE — SYR 3ML LL TIP 1/10ML GRAD --

## (undated) DEVICE — NDL PRT INJ NSAF BLNT 18GX1.5 --

## (undated) DEVICE — REM POLYHESIVE ADULT PATIENT RETURN ELECTRODE: Brand: VALLEYLAB